# Patient Record
Sex: MALE | Race: WHITE | Employment: OTHER | URBAN - METROPOLITAN AREA
[De-identification: names, ages, dates, MRNs, and addresses within clinical notes are randomized per-mention and may not be internally consistent; named-entity substitution may affect disease eponyms.]

---

## 2018-05-20 ENCOUNTER — APPOINTMENT (OUTPATIENT)
Dept: CT IMAGING | Age: 63
End: 2018-05-20
Payer: COMMERCIAL

## 2018-05-20 ENCOUNTER — HOSPITAL ENCOUNTER (EMERGENCY)
Age: 63
Discharge: HOME | End: 2018-05-20
Attending: EMERGENCY MEDICINE
Payer: COMMERCIAL

## 2018-05-20 VITALS
TEMPERATURE: 98.1 F | OXYGEN SATURATION: 98 % | WEIGHT: 225 LBS | SYSTOLIC BLOOD PRESSURE: 126 MMHG | RESPIRATION RATE: 16 BRPM | HEART RATE: 70 BPM | HEIGHT: 72 IN | DIASTOLIC BLOOD PRESSURE: 72 MMHG | BODY MASS INDEX: 30.48 KG/M2

## 2018-05-20 DIAGNOSIS — R20.0 NUMBNESS: Primary | ICD-10-CM

## 2018-05-20 DIAGNOSIS — R51.9 NONINTRACTABLE HEADACHE, UNSPECIFIED CHRONICITY PATTERN, UNSPECIFIED HEADACHE TYPE: ICD-10-CM

## 2018-05-20 LAB
ALBUMIN SERPL-MCNC: 4.4 G/DL (ref 3.5–5.2)
ALP BLD-CCNC: 90 U/L (ref 40–129)
ALT SERPL-CCNC: 22 U/L (ref 0–40)
ANION GAP SERPL CALCULATED.3IONS-SCNC: 13 MMOL/L (ref 7–16)
AST SERPL-CCNC: 20 U/L (ref 0–39)
BASOPHILS ABSOLUTE: 0.07 E9/L (ref 0–0.2)
BASOPHILS RELATIVE PERCENT: 0.8 % (ref 0–2)
BILIRUB SERPL-MCNC: 0.8 MG/DL (ref 0–1.2)
BUN BLDV-MCNC: 15 MG/DL (ref 8–23)
CALCIUM SERPL-MCNC: 9.3 MG/DL (ref 8.6–10.2)
CHLORIDE BLD-SCNC: 100 MMOL/L (ref 98–107)
CO2: 26 MMOL/L (ref 22–29)
CREAT SERPL-MCNC: 0.9 MG/DL (ref 0.7–1.2)
EKG ATRIAL RATE: 63 BPM
EKG P AXIS: 51 DEGREES
EKG P-R INTERVAL: 172 MS
EKG Q-T INTERVAL: 420 MS
EKG QRS DURATION: 82 MS
EKG QTC CALCULATION (BAZETT): 429 MS
EKG R AXIS: 30 DEGREES
EKG T AXIS: 44 DEGREES
EKG VENTRICULAR RATE: 63 BPM
EOSINOPHILS ABSOLUTE: 0.11 E9/L (ref 0.05–0.5)
EOSINOPHILS RELATIVE PERCENT: 1.3 % (ref 0–6)
GFR AFRICAN AMERICAN: >60
GFR NON-AFRICAN AMERICAN: >60 ML/MIN/1.73
GLUCOSE BLD-MCNC: 65 MG/DL (ref 74–109)
HCT VFR BLD CALC: 47.4 % (ref 37–54)
HEMOGLOBIN: 16.7 G/DL (ref 12.5–16.5)
IMMATURE GRANULOCYTES #: 0.03 E9/L
IMMATURE GRANULOCYTES %: 0.4 % (ref 0–5)
LYMPHOCYTES ABSOLUTE: 2.62 E9/L (ref 1.5–4)
LYMPHOCYTES RELATIVE PERCENT: 31.7 % (ref 20–42)
MCH RBC QN AUTO: 29.1 PG (ref 26–35)
MCHC RBC AUTO-ENTMCNC: 35.2 % (ref 32–34.5)
MCV RBC AUTO: 82.6 FL (ref 80–99.9)
MONOCYTES ABSOLUTE: 0.61 E9/L (ref 0.1–0.95)
MONOCYTES RELATIVE PERCENT: 7.4 % (ref 2–12)
NEUTROPHILS ABSOLUTE: 4.83 E9/L (ref 1.8–7.3)
NEUTROPHILS RELATIVE PERCENT: 58.4 % (ref 43–80)
PDW BLD-RTO: 11.8 FL (ref 11.5–15)
PLATELET # BLD: 309 E9/L (ref 130–450)
PMV BLD AUTO: 10.6 FL (ref 7–12)
POTASSIUM SERPL-SCNC: 4.1 MMOL/L (ref 3.5–5)
RBC # BLD: 5.74 E12/L (ref 3.8–5.8)
SODIUM BLD-SCNC: 139 MMOL/L (ref 132–146)
TOTAL PROTEIN: 7.7 G/DL (ref 6.4–8.3)
TROPONIN: <0.01 NG/ML (ref 0–0.03)
WBC # BLD: 8.3 E9/L (ref 4.5–11.5)

## 2018-05-20 PROCEDURE — 80053 COMPREHEN METABOLIC PANEL: CPT

## 2018-05-20 PROCEDURE — 99284 EMERGENCY DEPT VISIT MOD MDM: CPT

## 2018-05-20 PROCEDURE — 84484 ASSAY OF TROPONIN QUANT: CPT

## 2018-05-20 PROCEDURE — 6360000002 HC RX W HCPCS: Performed by: STUDENT IN AN ORGANIZED HEALTH CARE EDUCATION/TRAINING PROGRAM

## 2018-05-20 PROCEDURE — 96374 THER/PROPH/DIAG INJ IV PUSH: CPT

## 2018-05-20 PROCEDURE — 36415 COLL VENOUS BLD VENIPUNCTURE: CPT

## 2018-05-20 PROCEDURE — 85025 COMPLETE CBC W/AUTO DIFF WBC: CPT

## 2018-05-20 PROCEDURE — 70450 CT HEAD/BRAIN W/O DYE: CPT

## 2018-05-20 RX ORDER — KETOROLAC TROMETHAMINE 15 MG/ML
15 INJECTION, SOLUTION INTRAMUSCULAR; INTRAVENOUS ONCE
Status: COMPLETED | OUTPATIENT
Start: 2018-05-20 | End: 2018-05-20

## 2018-05-20 RX ADMIN — KETOROLAC TROMETHAMINE 15 MG: 15 INJECTION, SOLUTION INTRAMUSCULAR; INTRAVENOUS at 19:14

## 2018-05-20 ASSESSMENT — ENCOUNTER SYMPTOMS
SORE THROAT: 0
NAUSEA: 0
VOMITING: 0
SHORTNESS OF BREATH: 0
COLOR CHANGE: 0
COUGH: 0
BACK PAIN: 0
DIARRHEA: 0
ABDOMINAL PAIN: 0

## 2018-05-20 ASSESSMENT — PAIN SCALES - GENERAL: PAINLEVEL_OUTOF10: 6

## 2018-06-18 ENCOUNTER — HOSPITAL ENCOUNTER (OUTPATIENT)
Age: 63
Discharge: HOME | End: 2018-06-20
Payer: COMMERCIAL

## 2018-06-18 LAB
ALBUMIN SERPL-MCNC: 4.6 G/DL (ref 3.5–5.2)
ALP BLD-CCNC: 79 U/L (ref 40–129)
ALT SERPL-CCNC: 21 U/L (ref 0–40)
ANION GAP SERPL CALCULATED.3IONS-SCNC: 12 MMOL/L (ref 7–16)
AST SERPL-CCNC: 27 U/L (ref 0–39)
BILIRUB SERPL-MCNC: 0.4 MG/DL (ref 0–1.2)
BUN BLDV-MCNC: 19 MG/DL (ref 8–23)
CALCIUM SERPL-MCNC: 9.7 MG/DL (ref 8.6–10.2)
CHLORIDE BLD-SCNC: 101 MMOL/L (ref 98–107)
CO2: 24 MMOL/L (ref 22–29)
CREAT SERPL-MCNC: 1 MG/DL (ref 0.7–1.2)
GFR AFRICAN AMERICAN: >60
GFR NON-AFRICAN AMERICAN: >60 ML/MIN/1.73
GLUCOSE BLD-MCNC: 179 MG/DL (ref 74–109)
HBA1C MFR BLD: 9.6 % (ref 4.8–5.9)
POTASSIUM SERPL-SCNC: 4.7 MMOL/L (ref 3.5–5)
SODIUM BLD-SCNC: 137 MMOL/L (ref 132–146)
TOTAL PROTEIN: 7.4 G/DL (ref 6.4–8.3)

## 2018-06-18 PROCEDURE — 80053 COMPREHEN METABOLIC PANEL: CPT

## 2018-06-18 PROCEDURE — 83036 HEMOGLOBIN GLYCOSYLATED A1C: CPT

## 2018-07-17 ENCOUNTER — HOSPITAL ENCOUNTER (EMERGENCY)
Age: 63
Discharge: HOME | End: 2018-07-17
Payer: COMMERCIAL

## 2018-07-17 ENCOUNTER — APPOINTMENT (OUTPATIENT)
Dept: GENERAL RADIOLOGY | Age: 63
End: 2018-07-17
Payer: COMMERCIAL

## 2018-07-17 VITALS
HEART RATE: 64 BPM | TEMPERATURE: 97.4 F | DIASTOLIC BLOOD PRESSURE: 74 MMHG | HEIGHT: 72 IN | RESPIRATION RATE: 16 BRPM | BODY MASS INDEX: 29.8 KG/M2 | OXYGEN SATURATION: 98 % | SYSTOLIC BLOOD PRESSURE: 137 MMHG | WEIGHT: 220 LBS

## 2018-07-17 DIAGNOSIS — M54.50 ACUTE EXACERBATION OF CHRONIC LOW BACK PAIN: Primary | ICD-10-CM

## 2018-07-17 DIAGNOSIS — Z87.81 HISTORY OF COMPRESSION FRACTURE OF SPINE: ICD-10-CM

## 2018-07-17 DIAGNOSIS — G89.29 ACUTE EXACERBATION OF CHRONIC LOW BACK PAIN: Primary | ICD-10-CM

## 2018-07-17 PROCEDURE — 96372 THER/PROPH/DIAG INJ SC/IM: CPT

## 2018-07-17 PROCEDURE — 99283 EMERGENCY DEPT VISIT LOW MDM: CPT

## 2018-07-17 PROCEDURE — 72100 X-RAY EXAM L-S SPINE 2/3 VWS: CPT

## 2018-07-17 PROCEDURE — 6360000002 HC RX W HCPCS: Performed by: PHYSICIAN ASSISTANT

## 2018-07-17 RX ORDER — PREDNISONE 10 MG/1
20 TABLET ORAL DAILY
Qty: 10 TABLET | Refills: 0 | Status: SHIPPED | OUTPATIENT
Start: 2018-07-17 | End: 2018-07-22

## 2018-07-17 RX ORDER — NAPROXEN 500 MG/1
500 TABLET ORAL 2 TIMES DAILY
Qty: 14 TABLET | Refills: 0 | Status: SHIPPED | OUTPATIENT
Start: 2018-07-17 | End: 2018-07-24

## 2018-07-17 RX ORDER — CYCLOBENZAPRINE HCL 10 MG
10 TABLET ORAL 3 TIMES DAILY PRN
Qty: 21 TABLET | Refills: 0 | Status: SHIPPED | OUTPATIENT
Start: 2018-07-17 | End: 2018-07-24

## 2018-07-17 RX ORDER — DEXAMETHASONE SODIUM PHOSPHATE 10 MG/ML
10 INJECTION, SOLUTION INTRAMUSCULAR; INTRAVENOUS ONCE
Status: COMPLETED | OUTPATIENT
Start: 2018-07-17 | End: 2018-07-17

## 2018-07-17 RX ORDER — ORPHENADRINE CITRATE 30 MG/ML
60 INJECTION INTRAMUSCULAR; INTRAVENOUS ONCE
Status: COMPLETED | OUTPATIENT
Start: 2018-07-17 | End: 2018-07-17

## 2018-07-17 RX ORDER — KETOROLAC TROMETHAMINE 30 MG/ML
30 INJECTION, SOLUTION INTRAMUSCULAR; INTRAVENOUS ONCE
Status: COMPLETED | OUTPATIENT
Start: 2018-07-17 | End: 2018-07-17

## 2018-07-17 RX ADMIN — DEXAMETHASONE SODIUM PHOSPHATE 10 MG: 10 INJECTION, SOLUTION INTRAMUSCULAR; INTRAVENOUS at 10:26

## 2018-07-17 RX ADMIN — KETOROLAC TROMETHAMINE 30 MG: 30 INJECTION, SOLUTION INTRAMUSCULAR; INTRAVENOUS at 10:26

## 2018-07-17 RX ADMIN — ORPHENADRINE CITRATE 60 MG: 30 INJECTION INTRAMUSCULAR; INTRAVENOUS at 10:27

## 2018-07-17 ASSESSMENT — PAIN SCALES - GENERAL
PAINLEVEL_OUTOF10: 7
PAINLEVEL_OUTOF10: 9
PAINLEVEL_OUTOF10: 9

## 2018-07-17 ASSESSMENT — PAIN DESCRIPTION - ORIENTATION: ORIENTATION: LOWER

## 2018-07-17 ASSESSMENT — PAIN DESCRIPTION - LOCATION: LOCATION: BACK

## 2018-07-17 ASSESSMENT — PAIN DESCRIPTION - PAIN TYPE: TYPE: ACUTE PAIN

## 2018-07-17 ASSESSMENT — PAIN DESCRIPTION - DESCRIPTORS: DESCRIPTORS: ACHING

## 2018-07-17 NOTE — ED PROVIDER NOTES
orphenadrine (NORFLEX) injection 60 mg (60 mg Intramuscular Given 7/17/18 1027)   dexamethasone (PF) (DECADRON) injection 10 mg (10 mg Intramuscular Given 7/17/18 1026)        Re-examination:  7/17/18       Time: 1115    Patient's symptoms are improving. Consult(s):   None    Procedure(s):   none    Medical Decision Making/Counseling:    Patient presents to the emergency department for low back pain. Radiographs were obtained and confirmed the above findings. Patient received the medications listed above and he experienced moderate relief of his symptoms. Patient was able to walk prior to discharge. He has been given the contact information for Dr. Jonathan Chandra, neurosurgery, who he should follow up with regarding his compression fractures. Specific conditions for emergent return have been discussed and the patient verbalized understanding to return immediately for new or worsening symptoms. Assessment      1. Acute exacerbation of chronic low back pain    2. History of compression fracture of spine       Plan   Discharge to home  Patient condition is good    New Medications     New Prescriptions    CYCLOBENZAPRINE (FLEXERIL) 10 MG TABLET    Take 1 tablet by mouth 3 times daily as needed for Muscle spasms    NAPROXEN (NAPROSYN) 500 MG TABLET    Take 1 tablet by mouth 2 times daily for 7 days    PREDNISONE (DELTASONE) 10 MG TABLET    Take 2 tablets by mouth daily for 5 days     Electronically signed by Ewa Dubon PA-C   DD: 7/17/18  **This report was transcribed using voice recognition software. Every effort was made to ensure accuracy; however, inadvertent computerized transcription errors may be present.   END OF ED PROVIDER NOTE     Ewa Dubon PA-C  07/17/18 [id number]

## 2018-09-18 ENCOUNTER — HOSPITAL ENCOUNTER (OUTPATIENT)
Age: 63
Discharge: HOME | End: 2018-09-20
Payer: COMMERCIAL

## 2018-09-18 PROCEDURE — 83036 HEMOGLOBIN GLYCOSYLATED A1C: CPT

## 2018-09-19 LAB — HBA1C MFR BLD: 8.5 % (ref 4–5.6)

## 2019-01-02 ENCOUNTER — HOSPITAL ENCOUNTER (OUTPATIENT)
Age: 64
Discharge: HOME | End: 2019-01-04
Payer: COMMERCIAL

## 2019-01-02 LAB
ALBUMIN SERPL-MCNC: 4.5 G/DL (ref 3.5–5.2)
ALP BLD-CCNC: 91 U/L (ref 40–129)
ALT SERPL-CCNC: 25 U/L (ref 0–40)
ANION GAP SERPL CALCULATED.3IONS-SCNC: 16 MMOL/L (ref 7–16)
AST SERPL-CCNC: 33 U/L (ref 0–39)
BASOPHILS ABSOLUTE: 0.07 E9/L (ref 0–0.2)
BASOPHILS RELATIVE PERCENT: 1.1 % (ref 0–2)
BILIRUB SERPL-MCNC: 0.6 MG/DL (ref 0–1.2)
BUN BLDV-MCNC: 16 MG/DL (ref 8–23)
CALCIUM SERPL-MCNC: 9.5 MG/DL (ref 8.6–10.2)
CHLORIDE BLD-SCNC: 98 MMOL/L (ref 98–107)
CHOLESTEROL, TOTAL: 207 MG/DL (ref 0–199)
CO2: 24 MMOL/L (ref 22–29)
CREAT SERPL-MCNC: 0.9 MG/DL (ref 0.7–1.2)
EOSINOPHILS ABSOLUTE: 0.13 E9/L (ref 0.05–0.5)
EOSINOPHILS RELATIVE PERCENT: 2 % (ref 0–6)
GFR AFRICAN AMERICAN: >60
GFR NON-AFRICAN AMERICAN: >60 ML/MIN/1.73
GLUCOSE BLD-MCNC: 241 MG/DL (ref 74–99)
HCT VFR BLD CALC: 45.7 % (ref 37–54)
HDLC SERPL-MCNC: 87 MG/DL
HEMOGLOBIN: 15.7 G/DL (ref 12.5–16.5)
IMMATURE GRANULOCYTES #: 0.02 E9/L
IMMATURE GRANULOCYTES %: 0.3 % (ref 0–5)
LDL CHOLESTEROL CALCULATED: 105 MG/DL (ref 0–99)
LYMPHOCYTES ABSOLUTE: 1.43 E9/L (ref 1.5–4)
LYMPHOCYTES RELATIVE PERCENT: 22.1 % (ref 20–42)
MCH RBC QN AUTO: 29.8 PG (ref 26–35)
MCHC RBC AUTO-ENTMCNC: 34.4 % (ref 32–34.5)
MCV RBC AUTO: 86.7 FL (ref 80–99.9)
MONOCYTES ABSOLUTE: 0.43 E9/L (ref 0.1–0.95)
MONOCYTES RELATIVE PERCENT: 6.6 % (ref 2–12)
NEUTROPHILS ABSOLUTE: 4.4 E9/L (ref 1.8–7.3)
NEUTROPHILS RELATIVE PERCENT: 67.9 % (ref 43–80)
PDW BLD-RTO: 11.8 FL (ref 11.5–15)
PLATELET # BLD: 286 E9/L (ref 130–450)
PMV BLD AUTO: 12.3 FL (ref 7–12)
POTASSIUM SERPL-SCNC: 5.4 MMOL/L (ref 3.5–5)
RBC # BLD: 5.27 E12/L (ref 3.8–5.8)
SEDIMENTATION RATE, ERYTHROCYTE: 2 MM/HR (ref 0–15)
SODIUM BLD-SCNC: 138 MMOL/L (ref 132–146)
TOTAL PROTEIN: 7.5 G/DL (ref 6.4–8.3)
TRIGL SERPL-MCNC: 75 MG/DL (ref 0–149)
VLDLC SERPL CALC-MCNC: 15 MG/DL
WBC # BLD: 6.5 E9/L (ref 4.5–11.5)

## 2019-01-02 PROCEDURE — 85025 COMPLETE CBC W/AUTO DIFF WBC: CPT

## 2019-01-02 PROCEDURE — 80053 COMPREHEN METABOLIC PANEL: CPT

## 2019-01-02 PROCEDURE — 85651 RBC SED RATE NONAUTOMATED: CPT

## 2019-01-02 PROCEDURE — 80061 LIPID PANEL: CPT

## 2019-02-07 ENCOUNTER — HOSPITAL ENCOUNTER (OUTPATIENT)
Age: 64
Discharge: HOME | End: 2019-02-09
Payer: COMMERCIAL

## 2019-02-07 LAB — HBA1C MFR BLD: 9.6 % (ref 4–5.6)

## 2019-02-07 PROCEDURE — 83036 HEMOGLOBIN GLYCOSYLATED A1C: CPT

## 2019-05-13 ENCOUNTER — HOSPITAL ENCOUNTER (OUTPATIENT)
Age: 64
Discharge: HOME | End: 2019-05-15
Payer: COMMERCIAL

## 2019-05-13 PROCEDURE — 83036 HEMOGLOBIN GLYCOSYLATED A1C: CPT

## 2019-05-14 LAB — HBA1C MFR BLD: 9.7 % (ref 4–5.6)

## 2019-08-13 ENCOUNTER — HOSPITAL ENCOUNTER (OUTPATIENT)
Age: 64
Discharge: HOME | End: 2019-08-15
Payer: COMMERCIAL

## 2019-08-13 LAB
CREATININE URINE: 116 MG/DL (ref 40–278)
HBA1C MFR BLD: 9.2 % (ref 4–5.6)
MICROALBUMIN UR-MCNC: <12 MG/L
MICROALBUMIN/CREAT UR-RTO: ABNORMAL (ref 0–30)

## 2019-08-13 PROCEDURE — 82570 ASSAY OF URINE CREATININE: CPT

## 2019-08-13 PROCEDURE — 83036 HEMOGLOBIN GLYCOSYLATED A1C: CPT

## 2019-08-13 PROCEDURE — 82044 UR ALBUMIN SEMIQUANTITATIVE: CPT

## 2019-08-19 ENCOUNTER — HOSPITAL ENCOUNTER (EMERGENCY)
Age: 64
Discharge: HOME | End: 2019-08-19
Payer: COMMERCIAL

## 2019-08-19 VITALS
DIASTOLIC BLOOD PRESSURE: 77 MMHG | OXYGEN SATURATION: 100 % | HEIGHT: 72 IN | TEMPERATURE: 97.7 F | SYSTOLIC BLOOD PRESSURE: 134 MMHG | BODY MASS INDEX: 28.85 KG/M2 | WEIGHT: 213 LBS | RESPIRATION RATE: 18 BRPM | HEART RATE: 56 BPM

## 2019-08-19 DIAGNOSIS — R10.84 GENERALIZED ABDOMINAL PAIN: ICD-10-CM

## 2019-08-19 DIAGNOSIS — R51.9 ACUTE NONINTRACTABLE HEADACHE, UNSPECIFIED HEADACHE TYPE: Primary | ICD-10-CM

## 2019-08-19 LAB
ALBUMIN SERPL-MCNC: 4.5 G/DL (ref 3.5–5.2)
ALP BLD-CCNC: 81 U/L (ref 40–129)
ALT SERPL-CCNC: 23 U/L (ref 0–40)
ANION GAP SERPL CALCULATED.3IONS-SCNC: 10 MMOL/L (ref 7–16)
AST SERPL-CCNC: 28 U/L (ref 0–39)
BASOPHILS ABSOLUTE: 0.08 E9/L (ref 0–0.2)
BASOPHILS RELATIVE PERCENT: 1.2 % (ref 0–2)
BILIRUB SERPL-MCNC: 0.5 MG/DL (ref 0–1.2)
BILIRUBIN URINE: NEGATIVE
BLOOD, URINE: NEGATIVE
BUN BLDV-MCNC: 20 MG/DL (ref 8–23)
CALCIUM SERPL-MCNC: 9.5 MG/DL (ref 8.6–10.2)
CHLORIDE BLD-SCNC: 106 MMOL/L (ref 98–107)
CLARITY: CLEAR
CO2: 23 MMOL/L (ref 22–29)
COLOR: YELLOW
CREAT SERPL-MCNC: 1 MG/DL (ref 0.7–1.2)
EOSINOPHILS ABSOLUTE: 0.1 E9/L (ref 0.05–0.5)
EOSINOPHILS RELATIVE PERCENT: 1.5 % (ref 0–6)
GFR AFRICAN AMERICAN: >60
GFR NON-AFRICAN AMERICAN: >60 ML/MIN/1.73
GLUCOSE BLD-MCNC: 99 MG/DL (ref 74–99)
GLUCOSE URINE: NEGATIVE MG/DL
HCT VFR BLD CALC: 44.4 % (ref 37–54)
HEMOGLOBIN: 15.5 G/DL (ref 12.5–16.5)
IMMATURE GRANULOCYTES #: 0.01 E9/L
IMMATURE GRANULOCYTES %: 0.2 % (ref 0–5)
KETONES, URINE: NEGATIVE MG/DL
LEUKOCYTE ESTERASE, URINE: NEGATIVE
LYMPHOCYTES ABSOLUTE: 2.7 E9/L (ref 1.5–4)
LYMPHOCYTES RELATIVE PERCENT: 40.8 % (ref 20–42)
MCH RBC QN AUTO: 29.2 PG (ref 26–35)
MCHC RBC AUTO-ENTMCNC: 34.9 % (ref 32–34.5)
MCV RBC AUTO: 83.8 FL (ref 80–99.9)
MONOCYTES ABSOLUTE: 0.58 E9/L (ref 0.1–0.95)
MONOCYTES RELATIVE PERCENT: 8.8 % (ref 2–12)
NEUTROPHILS ABSOLUTE: 3.14 E9/L (ref 1.8–7.3)
NEUTROPHILS RELATIVE PERCENT: 47.5 % (ref 43–80)
NITRITE, URINE: NEGATIVE
PDW BLD-RTO: 11.9 FL (ref 11.5–15)
PH UA: 6 (ref 5–9)
PLATELET # BLD: 306 E9/L (ref 130–450)
PMV BLD AUTO: 10.4 FL (ref 7–12)
POTASSIUM SERPL-SCNC: 5.6 MMOL/L (ref 3.5–5)
PROTEIN UA: NEGATIVE MG/DL
RBC # BLD: 5.3 E12/L (ref 3.8–5.8)
SODIUM BLD-SCNC: 139 MMOL/L (ref 132–146)
SPECIFIC GRAVITY UA: 1.02 (ref 1–1.03)
TOTAL PROTEIN: 7.7 G/DL (ref 6.4–8.3)
UROBILINOGEN, URINE: 1 E.U./DL
WBC # BLD: 6.6 E9/L (ref 4.5–11.5)

## 2019-08-19 PROCEDURE — 99284 EMERGENCY DEPT VISIT MOD MDM: CPT

## 2019-08-19 PROCEDURE — 2580000003 HC RX 258: Performed by: NURSE PRACTITIONER

## 2019-08-19 PROCEDURE — 80053 COMPREHEN METABOLIC PANEL: CPT

## 2019-08-19 PROCEDURE — 85025 COMPLETE CBC W/AUTO DIFF WBC: CPT

## 2019-08-19 PROCEDURE — 81003 URINALYSIS AUTO W/O SCOPE: CPT

## 2019-08-19 PROCEDURE — 96375 TX/PRO/DX INJ NEW DRUG ADDON: CPT

## 2019-08-19 PROCEDURE — 36415 COLL VENOUS BLD VENIPUNCTURE: CPT

## 2019-08-19 PROCEDURE — 6360000002 HC RX W HCPCS: Performed by: NURSE PRACTITIONER

## 2019-08-19 PROCEDURE — 96374 THER/PROPH/DIAG INJ IV PUSH: CPT

## 2019-08-19 RX ORDER — 0.9 % SODIUM CHLORIDE 0.9 %
1000 INTRAVENOUS SOLUTION INTRAVENOUS ONCE
Status: COMPLETED | OUTPATIENT
Start: 2019-08-19 | End: 2019-08-19

## 2019-08-19 RX ORDER — KETOROLAC TROMETHAMINE 15 MG/ML
15 INJECTION, SOLUTION INTRAMUSCULAR; INTRAVENOUS ONCE
Status: COMPLETED | OUTPATIENT
Start: 2019-08-19 | End: 2019-08-19

## 2019-08-19 RX ORDER — DIPHENHYDRAMINE HYDROCHLORIDE 50 MG/ML
12.5 INJECTION INTRAMUSCULAR; INTRAVENOUS ONCE
Status: COMPLETED | OUTPATIENT
Start: 2019-08-19 | End: 2019-08-19

## 2019-08-19 RX ORDER — PROCHLORPERAZINE EDISYLATE 5 MG/ML
10 INJECTION INTRAMUSCULAR; INTRAVENOUS ONCE
Status: COMPLETED | OUTPATIENT
Start: 2019-08-19 | End: 2019-08-19

## 2019-08-19 RX ADMIN — PROCHLORPERAZINE EDISYLATE 10 MG: 5 INJECTION INTRAMUSCULAR; INTRAVENOUS at 20:29

## 2019-08-19 RX ADMIN — KETOROLAC TROMETHAMINE 15 MG: 15 INJECTION, SOLUTION INTRAMUSCULAR; INTRAVENOUS at 20:29

## 2019-08-19 RX ADMIN — SODIUM CHLORIDE 1000 ML: 9 INJECTION, SOLUTION INTRAVENOUS at 20:29

## 2019-08-19 RX ADMIN — DIPHENHYDRAMINE HYDROCHLORIDE 12.5 MG: 50 INJECTION INTRAMUSCULAR; INTRAVENOUS at 20:29

## 2019-08-19 ASSESSMENT — PAIN SCALES - GENERAL
PAINLEVEL_OUTOF10: 5
PAINLEVEL_OUTOF10: 8
PAINLEVEL_OUTOF10: 8

## 2019-08-19 ASSESSMENT — PAIN DESCRIPTION - LOCATION: LOCATION: ABDOMEN;HEAD

## 2019-08-19 ASSESSMENT — PAIN DESCRIPTION - PAIN TYPE: TYPE: ACUTE PAIN

## 2019-08-19 NOTE — ED PROVIDER NOTES
[facility]     Department of Emergency Medicine   ED  Provider Note  Admit Date/RoomTime: 8/19/2019  7:31 PM  ED Room: 17/17   Chief Complaint:   Headache (symptoms starting today); Abdominal Pain; and Numbness (face and hands)    History of Present Illness   Source of history provided by:  patient. History/Exam Limitations: none. Barry Parks is a 59 y.o. old male who has a past medical hx of:   Past Medical History:   Diagnosis Date    Chest pain 10-    exercise nuclear stress test    Depression     Diabetes mellitus ([facility] [address]     presents to the emergency department by private vehicle, for complaint of gradual onset frontal sharp pain which began 12 hour(s) prior to arrival.  Since onset the symptoms have been no change and moderate in severity. The patient has history of headaches of unknown type diagnosed in the past.   Today's episode is typical for him. He has had CT, MRI and PCP evaluation in the past.  MRI of the head from 1/10/2019 was reviewed and discussed with him. The pain is associated with generalized bilateral paresthesias and sharp generalized abdominal pains and negative for syncope, seizures, amaurosis, diplopia, other visual changes, involuntary movements, tremor, speech impairment, nausea, vomiting, diarrhea, dysuria, or weakness. The symptoms are aggravated by stated increased stress and working overtime last week and relieved by nothing. There has been no history of recent trauma. Treatment prior to arrival consisted of: unable to obtain relief with OTC meds with no relief of symptoms. He has a history of no anticoagulation use. ROS    Pertinent positives and negatives are stated within HPI, all other systems reviewed and are negative.     Past Surgical History:   Procedure Laterality Date    COLONOSCOPY      ENDOSCOPY, COLON, DIAGNOSTIC      FRACTURE SURGERY Right 2/2015    spiral fx    TONSILLECTOMY      TYMPANOPLASTY      bilateral   Social History:

## 2020-05-12 ENCOUNTER — HOSPITAL ENCOUNTER (OUTPATIENT)
Age: 65
Discharge: HOME | End: 2020-05-14
Payer: COMMERCIAL

## 2020-05-12 LAB
BASOPHILS ABSOLUTE: 0.07 E9/L (ref 0–0.2)
BASOPHILS RELATIVE PERCENT: 0.8 % (ref 0–2)
EOSINOPHILS ABSOLUTE: 0.03 E9/L (ref 0.05–0.5)
EOSINOPHILS RELATIVE PERCENT: 0.4 % (ref 0–6)
HCT VFR BLD CALC: 43.8 % (ref 37–54)
HEMOGLOBIN: 15 G/DL (ref 12.5–16.5)
IMMATURE GRANULOCYTES #: 0.03 E9/L
IMMATURE GRANULOCYTES %: 0.4 % (ref 0–5)
LYMPHOCYTES ABSOLUTE: 1.47 E9/L (ref 1.5–4)
LYMPHOCYTES RELATIVE PERCENT: 17.2 % (ref 20–42)
MCH RBC QN AUTO: 29.2 PG (ref 26–35)
MCHC RBC AUTO-ENTMCNC: 34.2 % (ref 32–34.5)
MCV RBC AUTO: 85.2 FL (ref 80–99.9)
MONOCYTES ABSOLUTE: 0.6 E9/L (ref 0.1–0.95)
MONOCYTES RELATIVE PERCENT: 7 % (ref 2–12)
NEUTROPHILS ABSOLUTE: 6.37 E9/L (ref 1.8–7.3)
NEUTROPHILS RELATIVE PERCENT: 74.2 % (ref 43–80)
PDW BLD-RTO: 11.8 FL (ref 11.5–15)
PLATELET # BLD: 361 E9/L (ref 130–450)
PMV BLD AUTO: 11.5 FL (ref 7–12)
RBC # BLD: 5.14 E12/L (ref 3.8–5.8)
WBC # BLD: 8.6 E9/L (ref 4.5–11.5)

## 2020-05-12 PROCEDURE — 85025 COMPLETE CBC W/AUTO DIFF WBC: CPT

## 2020-05-12 PROCEDURE — 80061 LIPID PANEL: CPT

## 2020-05-12 PROCEDURE — 84443 ASSAY THYROID STIM HORMONE: CPT

## 2020-05-12 PROCEDURE — 80053 COMPREHEN METABOLIC PANEL: CPT

## 2020-05-12 PROCEDURE — 83036 HEMOGLOBIN GLYCOSYLATED A1C: CPT

## 2020-05-13 LAB
ALBUMIN SERPL-MCNC: 4.5 G/DL (ref 3.5–5.2)
ALP BLD-CCNC: 93 U/L (ref 40–129)
ALT SERPL-CCNC: 24 U/L (ref 0–40)
ANION GAP SERPL CALCULATED.3IONS-SCNC: 14 MMOL/L (ref 7–16)
AST SERPL-CCNC: 15 U/L (ref 0–39)
BILIRUB SERPL-MCNC: 0.7 MG/DL (ref 0–1.2)
BUN BLDV-MCNC: 20 MG/DL (ref 8–23)
CALCIUM SERPL-MCNC: 9.8 MG/DL (ref 8.6–10.2)
CHLORIDE BLD-SCNC: 93 MMOL/L (ref 98–107)
CHOLESTEROL, TOTAL: 224 MG/DL (ref 0–199)
CO2: 22 MMOL/L (ref 22–29)
CREAT SERPL-MCNC: 1 MG/DL (ref 0.7–1.2)
GFR AFRICAN AMERICAN: >60
GFR NON-AFRICAN AMERICAN: >60 ML/MIN/1.73
GLUCOSE BLD-MCNC: 671 MG/DL (ref 74–99)
HBA1C MFR BLD: 10 % (ref 4–5.6)
HDLC SERPL-MCNC: 75 MG/DL
LDL CHOLESTEROL CALCULATED: 136 MG/DL (ref 0–99)
POTASSIUM SERPL-SCNC: 5.1 MMOL/L (ref 3.5–5)
SODIUM BLD-SCNC: 129 MMOL/L (ref 132–146)
TOTAL PROTEIN: 7.5 G/DL (ref 6.4–8.3)
TRIGL SERPL-MCNC: 67 MG/DL (ref 0–149)
TSH SERPL DL<=0.05 MIU/L-ACNC: 1.59 UIU/ML (ref 0.27–4.2)
VLDLC SERPL CALC-MCNC: 13 MG/DL

## 2020-05-18 ENCOUNTER — HOSPITAL ENCOUNTER (OUTPATIENT)
Dept: OCCUPATIONAL THERAPY | Age: 65
Setting detail: THERAPIES SERIES
Discharge: HOME | End: 2020-05-18
Payer: MEDICARE

## 2020-05-18 PROCEDURE — 97140 MANUAL THERAPY 1/> REGIONS: CPT | Performed by: OCCUPATIONAL THERAPIST

## 2020-05-18 PROCEDURE — 97165 OT EVAL LOW COMPLEX 30 MIN: CPT | Performed by: OCCUPATIONAL THERAPIST

## 2020-05-18 PROCEDURE — 97110 THERAPEUTIC EXERCISES: CPT | Performed by: OCCUPATIONAL THERAPIST

## 2020-05-18 NOTE — PROGRESS NOTES
No  [] Yes:  Barriers to Goal Achievement[de-identified]          [x] No  [] Yes:  Domestic Concerns:                           [x] No  [] Yes:     Goal Formulation: Patient  Time In: 1           Time Out:2                      Timed Code Treatment Minutes: 60 minutes        PLAN      Treatment to include:   [x] Instruction in HEP                   Modalities:  [x] Therapeutic Exercise        [x] Ultrasound               [] Electrical Stimulation/Attended  [x] PROM/Stretching                    [x] Fluidotherapy          [x]  Paraffin                   [x] AAROM  [x] AROM                 [] Iontophoresis: 4 mg/mL; Dexamethasone Sodium                                        [] Neuromuscular Re-education [] Splinting         [] Desensitization          [x] Therapeutic Activity       [] Pain Management with/without modalities PRN                 [x] Manual Therapy/Fascial release                            [x] Tendon Glides        [x]Joint Protection/Training  []Ergonomics                             [x] Joint Mobilization        [] Adaptive Equipment Assessment/Training                             [x] Manual Edema Mobilization    [] Energy Conservation/Work Simplification  [x] GM/FM Coordination       [] Safety retraining/education per  individual diagnosis/goals  [x] Scar Management        [x] ADL/IADL re-training       Patient Specific Goal: Normal use of my right hand                             GOALS (Long term same as Short term):  1) Patient will demonstrate good understanding of home program (exercises/activities/diagnosis/prognosis/goals) with good accuracy. 2) Patient will demonstrate increased active/passive range of motion of their affected extremity/hand to [facility] for ADL/IADL completion. 3) Patient will demonstrate increased /pinch strength of at least 10 / 5 pinch pounds of their affected hand.    4) Patient to report decreased pain in their affected hand/upper extremity from 2-5/10 to 0-2/10 or less with

## 2020-05-21 ENCOUNTER — HOSPITAL ENCOUNTER (OUTPATIENT)
Dept: OCCUPATIONAL THERAPY | Age: 65
Setting detail: THERAPIES SERIES
Discharge: HOME | End: 2020-05-21
Payer: MEDICARE

## 2020-05-21 PROCEDURE — 97110 THERAPEUTIC EXERCISES: CPT | Performed by: OCCUPATIONAL THERAPIST

## 2020-05-21 PROCEDURE — 97022 WHIRLPOOL THERAPY: CPT | Performed by: OCCUPATIONAL THERAPIST

## 2020-05-21 PROCEDURE — 97140 MANUAL THERAPY 1/> REGIONS: CPT | Performed by: OCCUPATIONAL THERAPIST

## 2020-05-21 NOTE — PROGRESS NOTES
OCCUPATIONAL THERAPY PROGRESS NOTE    Date:  2020     Initial Evaluation Date: 20                                Evaluating Therapist: Eliza Barger     Patient Name:  Marino Perez                    :  1955     Restrictions/Precautions:  none, low fall risk  Diagnosis:  Swelling R hand                                                   Insurance/Certification information:  Medical [place]   Referring Practitioner:  Dr. Bhavana Wray  Referring Practitioner specific orders: eval and treat, modalities as needed  Date of Surgery/Injury: rotator cuff surgery   Plan of care signed (Y/N):  No  Visit# / total visits: Pain Level: 4 on scale of 1-10, aching    Subjective: \"Doctor states I have carpal tunnel and eventually needing surgery. \"     Objective:  Updated POC to be completed by 20  INTERVENTION: COMPLETED: SPECIFICS/COMMENTS:   Modality:               AROM:     Tendon glides x 10x   Wrist all planes x 15x   AAROM:               PROM/Stretching:     All digits x 10 min        Scar Mass/Edema Control:     Soft tissue/retrograde massage x 15 min        Strengthening:     Light gripper x 15x2   Putty and pegs x 10 min   Green flexbar x 15x2   digitflex red x 15x2   Other:                 Assessment/Comments: Pt is making Good progress toward stated plan of care. Improved active  noted. Weakness throughout forearm and hand noted. Shakiness observed with activities. Possible nerve involvement from shoulder surgery. Will cont to monitor.     -Rehab Potential: Good    -Requires OT Follow Up: Yes  Time In:4           Time Out: 5              Treatment Charges:/ Mins Units   Modalities/fluido 10 1   Ther Exercise 30 2   Manual Therapy 15 1   Thera Activities     ADL/Home Mgt      Neuro Re-education     Gait Training     Group Therapy     Non-Billable Service Time     Other     Total Time/Units 55 4     Goals: Goals for pt can be seen on initial evaluation.     Plan:   [x]  Continues Plan

## 2020-05-26 ENCOUNTER — HOSPITAL ENCOUNTER (OUTPATIENT)
Dept: OCCUPATIONAL THERAPY | Age: 65
Setting detail: THERAPIES SERIES
Discharge: HOME | End: 2020-05-26
Payer: MEDICARE

## 2020-05-26 PROCEDURE — 97110 THERAPEUTIC EXERCISES: CPT | Performed by: OCCUPATIONAL THERAPIST

## 2020-05-26 PROCEDURE — 97022 WHIRLPOOL THERAPY: CPT | Performed by: OCCUPATIONAL THERAPIST

## 2020-05-26 PROCEDURE — 97140 MANUAL THERAPY 1/> REGIONS: CPT | Performed by: OCCUPATIONAL THERAPIST

## 2020-05-29 ENCOUNTER — HOSPITAL ENCOUNTER (OUTPATIENT)
Dept: OCCUPATIONAL THERAPY | Age: 65
Setting detail: THERAPIES SERIES
Discharge: HOME | End: 2020-05-29
Payer: MEDICARE

## 2020-05-29 PROCEDURE — 97140 MANUAL THERAPY 1/> REGIONS: CPT | Performed by: OCCUPATIONAL THERAPIST

## 2020-05-29 PROCEDURE — 97022 WHIRLPOOL THERAPY: CPT | Performed by: OCCUPATIONAL THERAPIST

## 2020-05-29 PROCEDURE — 97110 THERAPEUTIC EXERCISES: CPT | Performed by: OCCUPATIONAL THERAPIST

## 2020-05-29 NOTE — PROGRESS NOTES
ability to complete flexion. Pt noted to have full fist by end of session with decreased pain noted in 3rd digit.     -Rehab Potential: Good  -Requires OT Follow Up: Yes   Time In:12:00 pm           Time Out: 1:00 pm            Treatment Charges:/ Mins Units   Modalities/fluido 15 1   Ther Exercise 25 2   Manual Therapy 20 1   Thera Activities     ADL/Home Mgt      Neuro Re-education     Gait Training     Group Therapy     Non-Billable Service Time     Other     Total Time/Units 60 4     Goals: Goals for pt can be seen on initial evaluation. Plan:   [x]  Continues Plan of care: Treatment covered based on POC and graduated to patient's progress. Pt education continues at each visit to obtain maximum benefits from skilled OT intervention.   []  Alter Plan of care:   []  Discharge:    Reinaldo Jaimes [address], OTR/L #[id number]

## 2020-06-02 ENCOUNTER — HOSPITAL ENCOUNTER (OUTPATIENT)
Dept: OCCUPATIONAL THERAPY | Age: 65
Setting detail: THERAPIES SERIES
Discharge: HOME | End: 2020-06-02
Payer: COMMERCIAL

## 2020-06-02 PROCEDURE — 97018 PARAFFIN BATH THERAPY: CPT | Performed by: OCCUPATIONAL THERAPIST

## 2020-06-02 PROCEDURE — 97140 MANUAL THERAPY 1/> REGIONS: CPT | Performed by: OCCUPATIONAL THERAPIST

## 2020-06-02 PROCEDURE — 97110 THERAPEUTIC EXERCISES: CPT | Performed by: OCCUPATIONAL THERAPIST

## 2020-06-02 NOTE — PROGRESS NOTES
in flexion and extension         Strengthening:     Light gripper  15x2   Putty /meatballs  10 min   Putty Strengthening x 15 reps -  and pull toward self.with soft resistance putty. Focus on full grasp on putty before pulling. Putty Tools x Doorknob and L-bar x 15 reps ea against soft resistance putty. 5# Wrist all planes x 2x15 reps ea - Wrist flex/ext, RD/UD, sup/pro   2.2# Ball Grasps w/ Ext x 2x15 Palm grasps with wrist ext. Green flexbar  15x2 supination, pronation and twisting   digitflex Green  15x2 supination, pronation and lateral   Other:                 Assessment/Comments: Pt is making Good progress toward stated plan of care. After completing PROM and place and holds, pt able to pull hand into full fist. Pt very pleased with results. Improved ROM of MF with great tolerance. To complete place and holds at home. Full fist remained throughout session, with stiffness toward the end of session from advanced strengthening with no pain reported. Continue advancing as tolerated. -Rehab Potential: Good  -Requires OT Follow Up: Yes   Time In: 4:00 pm           Time Out: 5:00 pm            Treatment Charges: Mins Units   Modalities/Paraffin 10 1   Ther Exercise 30 2   Manual Therapy 20 1   Thera Activities     ADL/Home Mgt      Neuro Re-education     Gait Training     Group Therapy     Non-Billable Service Time     Other     Total Time/Units 60 4     Goals: Goals for pt can be seen on initial evaluation. Plan:   [x]  Continues Plan of care: Treatment covered based on POC and graduated to patient's progress. Pt education continues at each visit to obtain maximum benefits from skilled OT intervention.   []  Alter Plan of care:   []  Discharge:    Vanessa Munoz OTR/JOEL #[id number]

## 2020-06-04 ENCOUNTER — HOSPITAL ENCOUNTER (OUTPATIENT)
Dept: OCCUPATIONAL THERAPY | Age: 65
Setting detail: THERAPIES SERIES
Discharge: HOME | End: 2020-06-04
Payer: COMMERCIAL

## 2020-06-04 PROCEDURE — 97110 THERAPEUTIC EXERCISES: CPT | Performed by: OCCUPATIONAL THERAPIST

## 2020-06-04 PROCEDURE — 97140 MANUAL THERAPY 1/> REGIONS: CPT | Performed by: OCCUPATIONAL THERAPIST

## 2020-06-04 PROCEDURE — 97018 PARAFFIN BATH THERAPY: CPT | Performed by: OCCUPATIONAL THERAPIST

## 2020-06-04 NOTE — PROGRESS NOTES
OCCUPATIONAL THERAPY PROGRESS NOTE    Date:  2020     Initial Evaluation Date: 20                                Evaluating Therapist: Briana Wylie     Patient Name:  Geovanni Kowalski                    :  1955     Restrictions/Precautions:  none, low fall risk  Diagnosis:  Swelling R hand                                                   Insurance/Certification information:  Medical [place]   Referring Practitioner:  Dr. Gabby Haynes  Referring Practitioner specific orders: eval and treat, modalities as needed  Date of Surgery/Injury: rotator cuff surgery   Plan of care signed (Y/N):  No  Visit# / total visits:      Pain Level: 2 on scale of 1-10, aching in his knuckles     Subjective: Pt reported \"I've been doing my tendon glides, I had a little bit of swelling this morning. I might have over done it painting the gym floor last night. \"      Objective:  Updated POC to be completed by 20  INTERVENTION: COMPLETED: SPECIFICS/COMMENTS:   Modality:     Fluidotherapy  15 mins with AROM encouraged. Paraffin x 10 mins for soft tissue warm up prior to PROM with MHP. AROM:     Wristisizer  x6 with elbow on table for wrist isolation. Tendon glides  x15 with therapist assistance for stretching 3rd digit during different positions. Wrist all planes  15x   AAROM:               PROM/Stretching:     Manual Nerve Gliding x Various techniques with prolong holds of ulnar and median nerve glides   PROM of Wrist and All digits x Specifically 3rd digit at the MCP and PIP due to stiffness and pain reported. Cirilo Rolls x 15 reps - Rolled marker from hook fist to flat fist.   Place and Holds x 12 reps - Placed hand into full fist, pt to hold x 10 sec and release into full extension actively. Pt to quickly place hand back into prior position to achieve same fist positioning. Good results, improved fist by end of session.    Putty Fist Roll/Palm Presses x 10 reps ea Using soft resistance putty for stretching of flexors, extensors, tendons, and joints. Scar Mass/Edema Control:     Soft tissue/retrograde massage x 10 min with slight stretching throughout in flexion and extension         Strengthening:     Light gripper  15x2   Putty /meatballs  10 min   Hand Gripper x 2x15 reps - 5 sec holds. Black spring at level 3 resistance. Putty Strengthening x 15 reps -  and pull toward self.with soft resistance putty. Focus on full grasp on putty before pulling. 15 reps -  and pull wrist up into ext for tenodesis effect. Putty Tools  Doorknob and L-bar x 15 reps ea against soft resistance putty. 5# Wrist all planes  2x15 reps ea - Wrist flex/ext, RD/UD, sup/pro   2.2# Ball Grasps w/ Ext x 2x15 Palm grasps with wrist ext. Green flexbar  15x2 supination, pronation and twisting   digitflex Green  15x2 supination, pronation and lateral   Other:                 Assessment/Comments: Pt is making Good progress toward stated plan of care. Full fist achieved by mid session. Significant improvement in strength this date. Good tolerance for all exercises, continues with mild swelling and stiffness/mild aching in PIPs. 40#  strength measured this date in R hand.    -Rehab Potential: Good  -Requires OT Follow Up: Yes   Time In: 3:50 pm           Time Out: 4:50 pm            Treatment Charges: Mins Units   Modalities/Paraffin 10 1   Ther Exercise 30 2   Manual Therapy 20 1   Thera Activities     ADL/Home Mgt      Neuro Re-education     Gait Training     Group Therapy     Non-Billable Service Time     Other     Total Time/Units 60 4     Goals: Goals for pt can be seen on initial evaluation. Plan:   [x]  Continues Plan of care: Treatment covered based on POC and graduated to patient's progress. Pt education continues at each visit to obtain maximum benefits from skilled OT intervention.   []  Alter Plan of care:   []  Discharge:    Nunu Fontana, OTR/L #[id number]

## 2020-06-08 ENCOUNTER — HOSPITAL ENCOUNTER (OUTPATIENT)
Dept: OCCUPATIONAL THERAPY | Age: 65
Setting detail: THERAPIES SERIES
Discharge: HOME | End: 2020-06-08
Payer: COMMERCIAL

## 2020-06-08 PROCEDURE — 97022 WHIRLPOOL THERAPY: CPT | Performed by: OCCUPATIONAL THERAPIST

## 2020-06-08 PROCEDURE — 97110 THERAPEUTIC EXERCISES: CPT | Performed by: OCCUPATIONAL THERAPIST

## 2020-06-08 PROCEDURE — 97140 MANUAL THERAPY 1/> REGIONS: CPT | Performed by: OCCUPATIONAL THERAPIST

## 2020-06-11 ENCOUNTER — HOSPITAL ENCOUNTER (OUTPATIENT)
Dept: OCCUPATIONAL THERAPY | Age: 65
Setting detail: THERAPIES SERIES
Discharge: HOME | End: 2020-06-11
Payer: COMMERCIAL

## 2020-06-11 PROCEDURE — 97022 WHIRLPOOL THERAPY: CPT | Performed by: OCCUPATIONAL THERAPIST

## 2020-06-11 PROCEDURE — 97110 THERAPEUTIC EXERCISES: CPT | Performed by: OCCUPATIONAL THERAPIST

## 2020-06-11 PROCEDURE — 97140 MANUAL THERAPY 1/> REGIONS: CPT | Performed by: OCCUPATIONAL THERAPIST

## 2020-06-11 NOTE — PROGRESS NOTES
OCCUPATIONAL THERAPY PROGRESS NOTE    Date:  2020     Initial Evaluation Date: 20                                Evaluating Therapist: Sandor Lew     Patient Name:  Silvia Coleman                    :  1955     Restrictions/Precautions:  none, low fall risk  Diagnosis:  Swelling R hand                                                   Insurance/Certification information:  Medical [place]   Referring Practitioner:  Dr. Joao Malone  Referring Practitioner specific orders: eval and treat, modalities as needed  Date of Surgery/Injury: rotator cuff surgery   Plan of care signed (Y/N):  No  Visit# / total visits:      Pain Level: 2 on scale of 1-10, aching in his knuckles     Subjective: Pt reported \"hand and arm is feeling stronger. Really feel the weakness is from my shoulder surgery. Syd[place] \"   Objective:  Updated POC to be completed by 20  INTERVENTION: COMPLETED: SPECIFICS/COMMENTS:   Modality:     Fluidotherapy x 15 mins with AROM encouraged. Paraffin  10 mins for soft tissue warm up prior to PROM with MHP. AROM:     Wristisizer  x6 with elbow on table for wrist isolation. Tendon glides  x15 with therapist assistance for stretching 3rd digit during different positions. Wrist all planes x 15x   AAROM:               PROM/Stretching:     Manual Nerve Gliding x Various techniques with prolong holds of ulnar and median nerve glides   PROM of Wrist and All digits x Specifically 3rd digit at the MCP and PIP due to stiffness and pain reported. Cirilo Rolls x 15 reps - Rolled marker from hook fist to flat fist.   Place and Holds x 12 reps - Placed hand into full fist, pt to hold x 10 sec and release into full extension actively. Pt to quickly place hand back into prior position to achieve same fist positioning. Good results, improved fist by end of session.    Putty Fist Roll/Palm Presses x 10 reps ea Using soft resistance putty for stretching of flexors, extensors, tendons, and

## 2020-06-15 ENCOUNTER — HOSPITAL ENCOUNTER (OUTPATIENT)
Dept: OCCUPATIONAL THERAPY | Age: 65
Setting detail: THERAPIES SERIES
Discharge: HOME | End: 2020-06-15
Payer: COMMERCIAL

## 2020-06-15 PROCEDURE — 97140 MANUAL THERAPY 1/> REGIONS: CPT | Performed by: OCCUPATIONAL THERAPIST

## 2020-06-15 PROCEDURE — 97110 THERAPEUTIC EXERCISES: CPT | Performed by: OCCUPATIONAL THERAPIST

## 2020-06-15 PROCEDURE — 97022 WHIRLPOOL THERAPY: CPT | Performed by: OCCUPATIONAL THERAPIST

## 2020-06-15 NOTE — PROGRESS NOTES
OCCUPATIONAL THERAPY PROGRESS NOTE    Date:  6/15/2020     Initial Evaluation Date: 20                                Evaluating Therapist: Royce Connelly     Patient Name:  Tj Bain                    :  1955     Restrictions/Precautions:  none, low fall risk  Diagnosis:  Swelling R hand                                                   Insurance/Certification information:  Medical [place]   Referring Practitioner:  Dr. Pili Muller  Referring Practitioner specific orders: eval and treat, modalities as needed  Date of Surgery/Injury: rotator cuff surgery   Plan of care signed (Y/N):  No  Visit# / total visits:      Pain Level: 2 on scale of 1-10, aching in his knuckles     Subjective: Pt reported \"Progress is slow but cont's. Using hand for everything now \"   Objective:  Updated POC to be completed by 20  INTERVENTION: COMPLETED: SPECIFICS/COMMENTS:   Modality:     Fluidotherapy x 15 mins with AROM encouraged. Paraffin  10 mins for soft tissue warm up prior to PROM with MHP. AROM:     Wristisizer  x6 with elbow on table for wrist isolation. Tendon glides  x15 with therapist assistance for stretching 3rd digit during different positions. Wrist all planes x 15x   AAROM:               PROM/Stretching:     Manual Nerve Gliding x Various techniques with prolong holds of ulnar and median nerve glides   PROM of Wrist and All digits x Specifically 3rd digit at the MCP and PIP due to stiffness and pain reported. Cirilo Rolls x 15 reps - Rolled marker from hook fist to flat fist.   Place and Holds x 12 reps - Placed hand into full fist, pt to hold x 10 sec and release into full extension actively. Pt to quickly place hand back into prior position to achieve same fist positioning. Good results, improved fist by end of session. Putty Fist Roll/Palm Presses x 10 reps ea Using soft resistance putty for stretching of flexors, extensors, tendons, and joints.    Scar Mass/Edema Control: Soft tissue/retrograde massage x 10 min with slight stretching throughout in flexion and extension         Strengthening:     Light gripper  15x2   Putty /meatballs x 10 min   Hand Gripper x 2x15 reps - 5 sec holds. Black spring at level 3 resistance. 2# shoulder flex/abd to 90 degrees. tricep 2# , press ups 15x2 x Weakness and trembling is observed in shoulder and tricep area. Putty Strengthening x 15 reps -  and pull toward self.with soft resistance putty. Focus on full grasp on putty before pulling. 15 reps -  and pull wrist up into ext for tenodesis effect. Putty Tools  Doorknob and L-bar x 15 reps ea against soft resistance putty. 5# Wrist all planes x 2x15 reps ea - Wrist flex/ext, RD/UD, sup/pro   2.2# [place] w/ Ext  2x15 Palm grasps with wrist ext. Green flexbar x 15x2 supination, pronation and twisting   digitflex Green x 15x2 supination, pronation and lateral   Other:                 Assessment/Comments: Pt is making Good progress toward stated plan of care. Weakness in hand and arm presents from shoulder surgery. Strength and endurance is progressing. Less shininess is observed in the right hand with improved nerve function.   -Rehab Potential: Good  -Requires OT Follow Up: Yes   Time In: 3:30 pm           Time Out: 4:30 pm            Treatment Charges: Mins Units   Modalities/fluido 10 1   Ther Exercise 30 2   Manual Therapy 20 1   Thera Activities     ADL/Home Mgt      Neuro Re-education     Gait Training     Group Therapy     Non-Billable Service Time     Other     Total Time/Units 60 4     Goals: Goals for pt can be seen on initial evaluation. Plan:   [x]  Continues Plan of care: Treatment covered based on POC and graduated to patient's progress. Pt education continues at each visit to obtain maximum benefits from skilled OT intervention.   []  Alter Plan of care:   []  Discharge:  Talita Blount OT/L, [address] [place] [address]

## 2020-06-18 ENCOUNTER — HOSPITAL ENCOUNTER (OUTPATIENT)
Dept: OCCUPATIONAL THERAPY | Age: 65
Setting detail: THERAPIES SERIES
Discharge: HOME | End: 2020-06-18
Payer: COMMERCIAL

## 2020-06-18 PROCEDURE — 97110 THERAPEUTIC EXERCISES: CPT | Performed by: OCCUPATIONAL THERAPIST

## 2020-06-18 PROCEDURE — 97140 MANUAL THERAPY 1/> REGIONS: CPT | Performed by: OCCUPATIONAL THERAPIST

## 2020-06-18 PROCEDURE — 97018 PARAFFIN BATH THERAPY: CPT | Performed by: OCCUPATIONAL THERAPIST

## 2020-06-18 NOTE — PROGRESS NOTES
OCCUPATIONAL THERAPY PROGRESS NOTE    Date:  2020     Initial Evaluation Date: 20                                Evaluating Therapist: Sigifredo Smart     Patient Name:  Kush Fernandes                    :  1955     Restrictions/Precautions:  none, low fall risk  Diagnosis:  Swelling R hand                                                   Insurance/Certification information:  Medical [place]   Referring Practitioner:  Dr. Bibi Aleman  Referring Practitioner specific orders: eval and treat, modalities as needed  Date of Surgery/Injury: rotator cuff surgery   Plan of care signed (Y/N):  No  Visit# / total visits: 10 / 18     Pain Level: 2 on scale of 1-10, aching in his knuckles     Subjective: Pt reported \"Progress cont's but is slow. Feeling my muscles are coming back. \"  \"   Objective:  Updated POC to be completed by 20  INTERVENTION: COMPLETED: SPECIFICS/COMMENTS:   Modality:     Fluidotherapy x 15 mins with AROM encouraged. Paraffin  10 mins for soft tissue warm up prior to PROM with MHP. AROM:     Wristisizer  x6 with elbow on table for wrist isolation. Tendon glides  x15 with therapist assistance for stretching 3rd digit during different positions. Wrist all planes x 15x   AAROM:               PROM/Stretching:     Manual Nerve Gliding x Various techniques with prolong holds of ulnar and median nerve glides   PROM of Wrist and All digits x Specifically 3rd digit at the MCP and PIP due to stiffness and pain reported. Cirilo Rolls x 15 reps - Rolled marker from hook fist to flat fist.   Place and Holds x 12 reps - Placed hand into full fist, pt to hold x 10 sec and release into full extension actively. Pt to quickly place hand back into prior position to achieve same fist positioning. Good results, improved fist by end of session. Putty Fist Roll/Palm Presses x 10 reps ea Using soft resistance putty for stretching of flexors, extensors, tendons, and joints.    Scar

## 2020-06-22 ENCOUNTER — HOSPITAL ENCOUNTER (OUTPATIENT)
Dept: OCCUPATIONAL THERAPY | Age: 65
Setting detail: THERAPIES SERIES
Discharge: HOME | End: 2020-06-22
Payer: COMMERCIAL

## 2020-06-22 PROCEDURE — 97140 MANUAL THERAPY 1/> REGIONS: CPT | Performed by: OCCUPATIONAL THERAPIST

## 2020-06-22 PROCEDURE — 97110 THERAPEUTIC EXERCISES: CPT | Performed by: OCCUPATIONAL THERAPIST

## 2020-06-22 PROCEDURE — 97022 WHIRLPOOL THERAPY: CPT | Performed by: OCCUPATIONAL THERAPIST

## 2020-06-22 NOTE — PROGRESS NOTES
reps ea Using soft resistance putty for stretching of flexors, extensors, tendons, and joints. Scar Mass/Edema Control:     Soft tissue/retrograde massage x 10 min/Shoulder bicep tendon soreness noted, forearm/hand        Strengthening:     Light gripper  15x2   Putty /meatballs x 10 min   Hand Gripper x 2x15 reps - 5 sec holds. Black spring at level 3 resistance. 2# shoulder flex/abd to 90 degrees. tricep 2# , press ups 15x2  Weakness and trembling is observed in shoulder and tricep area. Putty Strengthening x 15 reps -  and pull toward self.with soft resistance putty. Focus on full grasp on putty before pulling. 15 reps -  and pull wrist up into ext for tenodesis effect. Putty Tools  Doorknob and L-bar x 15 reps ea against soft resistance putty. 5# Wrist all planes  2x15 reps ea - Wrist flex/ext, RD/UD, sup/pro   2.2# [place] w/ Ext  2x15 Palm grasps with wrist ext. Green flexbar  15x2 supination, pronation and twisting   digitflex Green x 15x2 supination, pronation and lateral   Other:                 Assessment/Comments: Pt is making Good progress toward stated plan of care. Presents with pain in bicep tendon from painting. Some increased tightness in the R hand also noted. Improved  after stretches and some less pain reported. Less strengthening performed today secondary to pain. Pt. To cont stretching and icing shoulder at home. .  -Rehab Potential: Good  -Requires OT Follow Up: Yes   Time In: 3:30 pm           Time Out: 4:30 pm            Treatment Charges: Mins Units   Modalities/fluido 10 1   Ther Exercise 30 2   Manual Therapy 20 1   Thera Activities     ADL/Home Mgt      Neuro Re-education     Gait Training     Group Therapy     Non-Billable Service Time     Other     Total Time/Units 60 4     Goals: Goals for pt can be seen on initial evaluation. Plan:   [x]  Continues Plan of care: Treatment covered based on POC and graduated to patient's progress.  Pt education continues at each visit to obtain maximum benefits from skilled OT intervention.   []  Alter Plan of care:   []  Discharge:  Anival Pelaez OT/, [address] [place] [address]

## 2020-06-25 ENCOUNTER — HOSPITAL ENCOUNTER (OUTPATIENT)
Dept: OCCUPATIONAL THERAPY | Age: 65
Setting detail: THERAPIES SERIES
Discharge: HOME | End: 2020-06-25
Payer: COMMERCIAL

## 2020-06-25 PROCEDURE — 97022 WHIRLPOOL THERAPY: CPT | Performed by: OCCUPATIONAL THERAPIST

## 2020-06-25 PROCEDURE — 97110 THERAPEUTIC EXERCISES: CPT | Performed by: OCCUPATIONAL THERAPIST

## 2020-06-25 PROCEDURE — 97140 MANUAL THERAPY 1/> REGIONS: CPT | Performed by: OCCUPATIONAL THERAPIST

## 2020-06-25 NOTE — PROGRESS NOTES
OCCUPATIONAL THERAPY PROGRESS NOTE/REASSESSMENT    Date:  2020     Initial Evaluation Date: 20                                Evaluating Therapist: Nba Suresh     Patient Name:  Janice Snyder                    :  1955     Restrictions/Precautions:  none, low fall risk  Diagnosis:  Swelling R hand                                                   Insurance/Certification information:  Medical [place]   Referring Practitioner:  Dr. Eloina José  Referring Practitioner specific orders: eval and treat, modalities as needed  Date of Surgery/Injury: rotator cuff surgery   Plan of care signed (Y/N):  No  Visit# / total visits:      Pain Level: 2 on scale of 1-10, aching in his knuckles     Subjective: Pt reported \"painted a couple walls and now my shoulder and hand are really hurting and I'm unable to close my hand as tight. Something in my shoulder is not right causing these issues. \"  \"   Objective:  Updated POC to be completed by 20  INTERVENTION: COMPLETED: SPECIFICS/COMMENTS:   Modality:     Fluidotherapy x 15 mins with AROM encouraged. Paraffin  10 mins for soft tissue warm up prior to PROM with MHP. AROM:     Wristisizer  x6 with elbow on table for wrist isolation. Tendon glides  x15 with therapist assistance for stretching 3rd digit during different positions. Wrist all planes x 15x   AAROM:               PROM/Stretching:     Manual Nerve Gliding x Various techniques with prolong holds of ulnar and median nerve glides   PROM of Wrist and All digits x Specifically 3rd digit at the MCP and PIP due to stiffness and pain reported. Cirilo Rolls x 15 reps - Rolled marker from hook fist to flat fist.   Place and Holds x 12 reps - Placed hand into full fist, pt to hold x 10 sec and release into full extension actively. Pt to quickly place hand back into prior position to achieve same fist positioning. Good results, improved fist by end of session.    Putty Fist Roll/Palm Presses x 10 reps ea Using soft resistance putty for stretching of flexors, extensors, tendons, and joints. Scar Mass/Edema Control:     Soft tissue/retrograde massage x 10 min/Shoulder bicep tendon soreness noted, forearm/hand        Strengthening:     Light gripper  15x2   Putty /meatballs x 10 min   Hand Gripper x 2x15 reps - 5 sec holds. Black spring at level 3 resistance. 2# shoulder flex/abd to 90 degrees. tricep 2# , press ups 15x2  Weakness and trembling is observed in shoulder and tricep area. Putty Strengthening x 15 reps -  and pull toward self.with soft resistance putty. Focus on full grasp on putty before pulling. 15 reps -  and pull wrist up into ext for tenodesis effect. Putty Tools  Doorknob and L-bar x 15 reps ea against soft resistance putty. 5# Wrist all planes  2x15 reps ea - Wrist flex/ext, RD/UD, sup/pro   2.2# Katrinka [place] w/ Ext  2x15 Palm grasps with wrist ext. Green flexbar  15x2 supination, pronation and twisting   digitflex Green x 15x2 supination, pronation and lateral   Other:                 Re-Assessment/Comments: Pt is making Good progress toward stated plan of care. . Overall good progress however past week some increased swelling and pain, stiffness in shoulder and hand secondary to painting at work.    6/25/20   Edema Description/Circumferential Measurements: goal partially met             MILD- Mod swelling  Dynamometer (setting 2):   Goal partially met                         Left: 82#                                               Right: 35#                     Pinch Meter: goal met              Lateral: Left= 15# ,Right= 15#              Palmar 3 point: Left= 16#, Right= 16#  9 Hole Peg Test: goal met              Left: 20 sec              Right: 25 sec     -Rehab Potential: Good  -Requires OT Follow Up: Yes   Time In: 3:30 pm           Time Out: 4:30 pm            Treatment Charges: Mins Units   Modalities/fluido 10 1   Ther Exercise 30 2   Manual Therapy 20

## 2020-06-29 ENCOUNTER — HOSPITAL ENCOUNTER (OUTPATIENT)
Dept: OCCUPATIONAL THERAPY | Age: 65
Setting detail: THERAPIES SERIES
Discharge: HOME | End: 2020-06-29
Payer: COMMERCIAL

## 2020-06-29 PROCEDURE — 97110 THERAPEUTIC EXERCISES: CPT | Performed by: OCCUPATIONAL THERAPIST

## 2020-06-29 PROCEDURE — 97140 MANUAL THERAPY 1/> REGIONS: CPT | Performed by: OCCUPATIONAL THERAPIST

## 2020-06-29 PROCEDURE — 97022 WHIRLPOOL THERAPY: CPT | Performed by: OCCUPATIONAL THERAPIST

## 2020-07-02 ENCOUNTER — HOSPITAL ENCOUNTER (OUTPATIENT)
Dept: OCCUPATIONAL THERAPY | Age: 65
Setting detail: THERAPIES SERIES
Discharge: HOME | End: 2020-07-02
Payer: MEDICARE

## 2020-07-02 PROCEDURE — 97140 MANUAL THERAPY 1/> REGIONS: CPT | Performed by: OCCUPATIONAL THERAPIST

## 2020-07-02 PROCEDURE — 97022 WHIRLPOOL THERAPY: CPT | Performed by: OCCUPATIONAL THERAPIST

## 2020-07-02 PROCEDURE — 97110 THERAPEUTIC EXERCISES: CPT | Performed by: OCCUPATIONAL THERAPIST

## 2020-07-02 NOTE — PROGRESS NOTES
OCCUPATIONAL THERAPY PROGRESS NOTE/REASSESSMENT    Date:  2020     Initial Evaluation Date: 20                                Evaluating Therapist: Gisell Chavez     Patient Name:  Luz Lees                    :  1955     Restrictions/Precautions:  none, low fall risk  Diagnosis:  Swelling R hand                                                   Insurance/Certification information:  Medical [place]   Referring Practitioner:  Dr. Mark Moctezuma  Referring Practitioner specific orders: eval and treat, modalities as needed  Date of Surgery/Injury: rotator cuff surgery   Plan of care signed (Y/N):  No  Visit# / total visits:      Pain Level: 2 on scale of 1-10, aching in his knuckles     Subjective: Pt reported \"having a lot of swelling in the morming and feeling like I should do the surgery for carpal tunnel\"  \"   Objective:  Updated POC to be completed by 20  INTERVENTION: COMPLETED: SPECIFICS/COMMENTS:   Modality:     Fluidotherapy x 15 mins with AROM encouraged. Paraffin  10 mins for soft tissue warm up prior to PROM with MHP. AROM:     Wristisizer  x6 with elbow on table for wrist isolation. Tendon glides  x15 with therapist assistance for stretching 3rd digit during different positions. Wrist all planes x 15x   AAROM:               PROM/Stretching:     Manual Nerve Gliding x Various techniques with prolong holds of ulnar and median nerve glides   PROM of Wrist and All digits x Specifically 3rd digit at the MCP and PIP due to stiffness and pain reported. Cirilo Rolls x 15 reps - Rolled marker from hook fist to flat fist.   Place and Holds x 12 reps - Placed hand into full fist, pt to hold x 10 sec and release into full extension actively. Pt to quickly place hand back into prior position to achieve same fist positioning. Good results, improved fist by end of session.    Putty Fist Roll/Palm Presses x 10 reps ea Using soft resistance putty for stretching of flexors, Activities     ADL/Home Mgt      Neuro Re-education     Gait Training     Group Therapy     Non-Billable Service Time     Other     Total Time/Units 60 4     Goals: Goals for pt can be seen on initial evaluation. Plan:   [x]  Continues Plan of care: Treatment covered based on POC and graduated to patient's progress. Pt education continues at each visit to obtain maximum benefits from skilled OT intervention.   []  Alter Plan of care:   []  Discharge:  Gayle Burn OT/L, [address] [place] [address]

## 2020-07-07 ENCOUNTER — HOSPITAL ENCOUNTER (OUTPATIENT)
Dept: OCCUPATIONAL THERAPY | Age: 65
Setting detail: THERAPIES SERIES
Discharge: HOME | End: 2020-07-07
Payer: MEDICARE

## 2020-07-07 PROCEDURE — 97110 THERAPEUTIC EXERCISES: CPT | Performed by: OCCUPATIONAL THERAPIST

## 2020-07-07 PROCEDURE — 97140 MANUAL THERAPY 1/> REGIONS: CPT | Performed by: OCCUPATIONAL THERAPIST

## 2020-07-07 PROCEDURE — 97165 OT EVAL LOW COMPLEX 30 MIN: CPT | Performed by: OCCUPATIONAL THERAPIST

## 2020-07-07 NOTE — PROGRESS NOTES
OCCUPATIONAL THERAPY PROGRESS NOTE/DISCHARGE    Date:  2020     Initial Evaluation Date: 20                                Evaluating Therapist: Alexandra Arora     Patient Name:  Akbar Mac                    :  1955     Restrictions/Precautions:  none, low fall risk  Diagnosis:  Swelling R hand                                                   Insurance/Certification information:  Medical [place]   Referring Practitioner:  Dr. Jamaal Licona  Referring Practitioner specific orders: eval and treat, modalities as needed  Date of Surgery/Injury: rotator cuff surgery   Plan of care signed (Y/N):  No  Visit# / total visits:      Pain Level: 2 on scale of 1-10, aching in his knuckles     Subjective: Pt reported \"My hand is getting worse and I think I need that carpal tunnel surgery\"     Objective:  Updated POC to be completed by 20  INTERVENTION: COMPLETED: SPECIFICS/COMMENTS:   Modality:     Fluidotherapy x 15 mins with AROM encouraged. Paraffin  10 mins for soft tissue warm up prior to PROM with MHP. AROM:     Wristisizer  x6 with elbow on table for wrist isolation. Tendon glides  x15 with therapist assistance for stretching 3rd digit during different positions. Wrist all planes x 15x   AAROM:               PROM/Stretching:     Manual Nerve Gliding x Various techniques with prolong holds of ulnar and median nerve glides   PROM of Wrist and All digits x Specifically 3rd digit at the MCP and PIP due to stiffness and pain reported. Cirilo Rolls x 15 reps - Rolled marker from hook fist to flat fist.   Place and Holds x 12 reps - Placed hand into full fist, pt to hold x 10 sec and release into full extension actively. Pt to quickly place hand back into prior position to achieve same fist positioning. Good results, improved fist by end of session. Putty Fist Roll/Palm Presses x 10 reps ea Using soft resistance putty for stretching of flexors, extensors, tendons, and joints. Potential: Good  -Requires OT Follow Up: Yes   Time In: 3:30 pm           Time Out: 4:30 pm            Treatment Charges: Mins Units   Modalities/fluido 10 1   Ther Exercise 30 2   Manual Therapy 20 1   Thera Activities     ADL/Home Mgt      Neuro Re-education     Gait Training     Group Therapy     Non-Billable Service Time     Other     Total Time/Units 60 4     Goals: Goals for pt can be seen on initial evaluation. Plan:   []  Continues Plan of care: Treatment covered based on POC and graduated to patient's progress. Pt education continues at each visit to obtain maximum benefits from skilled OT intervention.   []  Alter Plan of care:   [x]  Discharge:  Gisell Chavez OT/L, [address] [place] [address]

## 2020-07-09 ENCOUNTER — APPOINTMENT (OUTPATIENT)
Dept: OCCUPATIONAL THERAPY | Age: 65
End: 2020-07-09
Payer: MEDICARE

## 2020-08-03 ENCOUNTER — HOSPITAL ENCOUNTER (OUTPATIENT)
Dept: OCCUPATIONAL THERAPY | Age: 65
Setting detail: THERAPIES SERIES
Discharge: HOME | End: 2020-08-03
Payer: MEDICARE

## 2020-08-03 PROCEDURE — 97140 MANUAL THERAPY 1/> REGIONS: CPT | Performed by: OCCUPATIONAL THERAPIST

## 2020-08-03 PROCEDURE — 97165 OT EVAL LOW COMPLEX 30 MIN: CPT | Performed by: OCCUPATIONAL THERAPIST

## 2020-08-03 PROCEDURE — 97110 THERAPEUTIC EXERCISES: CPT | Performed by: OCCUPATIONAL THERAPIST

## 2020-08-03 NOTE — PROGRESS NOTES
OCCUPATIONAL THERAPY INITIAL EVALUATION    [place]  SEYZ OT Anuj [address], [address] [facility] [address]  Dept: [id number] 66 29: [phone]   SEAT Fax: [phone]    Date:  8/3/2020  Initial Evaluation Date: 8/3/20   Evaluating Therapist: Abdias Luna    Patient Name:  Vini Duncan    :  1955    Restrictions/Precautions:  none, low fall risk  Diagnosis:  Swelling R hand /CTR R       Insurance/Certification information:  [place] [employer]  Referring Practitioner:  Dr. Chilango Benavides  Referring Practitioner specific orders: eval and treat. Edema control, etc, rom  Date of Surgery/Injury: CTR 20  Plan of care signed (Y/N):  No  Visit# / total visits:     Past Medical History:   Past Medical History:   Diagnosis Date    Chest pain 10-    exercise nuclear stress test    Depression     Diabetes mellitus ([facility] [address]      Past Surgical History:   Past Surgical History:   Procedure Laterality Date    COLONOSCOPY      ENDOSCOPY, COLON, DIAGNOSTIC      FRACTURE SURGERY Right 2015    spiral fx    TONSILLECTOMY      TYMPANOPLASTY      bilateral       Reason for Referral: Pt. Reports secondary to cont'ing to have trouble with swelling , pain, decreased , weakness, pt. underwent surgery for CTR. Since surgery he complains of sign swelling, decreased  and decreased functional use of the right hand.    Home Living: Pt. Lives with girlfriend  Prior Level of Function: independent    Cognition:   Alert/Oriented x3     IADL STATUS:      Ind  Mod I  Min A  Mod A  Max A  Dep  N/A    Homemaking Responsibility:  []   [x]   []   []   []   []  []  Shopping Responsibility:  []   [x]   []   []   []   []  []  Mode of Transportation:  [x]   []   []   []   []   []  []  Leisure & Hobbies:   []   []   []   [x]   []   []  []  Work:     []   []   []   []   []   []  [x]  Comments:     ADL STATUS:    Ind      Mod I    Min A  Mod A  Max A  Dep  N/A  Feeding:  [x]   []   [] []   []   []  []  Grooming:  []   [x]   []   []   []   []  []  Bathing:  []   [x]   []   []   []   []  []  UE Dressing:  []   [x]   []   []   []   []  []  LE Dressing:  []   [x]   []   []   []   []  []  Toileting:  []   [x]   []   []   []   []  []  Transfer:  [x]   []   []   []   []   []  []  Comments:    Pain Level: 0-7, aching , shooting pains which come and go. UE Assessment:  Pt. Demonstrates approx 1/4-1/2 range of an active fist with 3/4 active finger ext. Opposition to P2 only. No sign atrophy noted. Mod swelling noted throughout hand and wrist.  Comment: Hand Dominance is R  Active abd/add: difficulty add small digit. Sensation: R hand  SEMMES-JUANITA Median N Ulnar N Radial N Other   Normal Touch  Size: 2.83 WNL WNL     Diminished Light Touch   Size: 3.61 All tips All tips     Diminished Protective Sense  Size: 4.31       Loss of Protective Sense   Size: 4.56       Loss of Sensation  Size: 6.65         Edema Description/Circumferential Measurements: Mod swelling  Dynamometer (setting 2):     Left: 82#      Right: deferred will measure next week    Pinch Meter:   Lateral: Left= 15# ,Right= TBA#   Palmar 3 point: Left= 12#, Right= TBA#  9 Hole Peg Test:   Left: 20 sec   Right: 1 min 20 sec    QuickDASH Score: (Scale 100% full disability, 0 no disability): 75%    Intervention: fluido, arom, prom, soft tissue mobilization, beginning hep provided.      Assessment of current deficits   Functional mobility [x]  ADLs [x] Strength [x]  Cognition []  Functional transfers  [] IADLs [] Safety Awareness [x]  Endurance [x]  Fine Motor Coordination [x] Balance [] Vision/perception [] Sensation []   Gross Motor Coordination [] ROM [x]     Eval Complexity: low  Profile and History- low  Assessment of Occupational Performance and Identification of Deficits- low   Clinical Decision Making- low    Rehab Potential:                                 [x] Good  [] Fair  [] Poor        Suggested Professional Referral: [x] No  [] Yes:  Barriers to Goal Achievement[de-identified]          [x] No  [] Yes:  Domestic Concerns:                           [x] No  [] Yes:     Goal Formulation: Patient  Time In: 1           Time Out:2                      Timed Code Treatment Minutes: 60 minutes        PLAN      Treatment to include:   [x] Instruction in HEP                   Modalities:  [x] Therapeutic Exercise        [x] Ultrasound               [] Electrical Stimulation/Attended  [x] PROM/Stretching                    [x] Fluidotherapy          [x]  Paraffin                   [x] AAROM  [x] AROM                 [] Iontophoresis: 4 mg/mL; Dexamethasone Sodium                                        [] Neuromuscular Re-education [] Splinting         [] Desensitization          [x] Therapeutic Activity       [] Pain Management with/without modalities PRN                 [x] Manual Therapy/Fascial release                            [x] Tendon Glides        [x]Joint Protection/Training  []Ergonomics                             [x] Joint Mobilization        [] Adaptive Equipment Assessment/Training                             [x] Manual Edema Mobilization    [] Energy Conservation/Work Simplification  [x] GM/FM Coordination       [] Safety retraining/education per  individual diagnosis/goals  [x] Scar Management        [x] ADL/IADL re-training       Patient Specific Goal: Normal use of my right hand                             GOALS (Long term same as Short term):  1) Patient will demonstrate good understanding of home program (exercises/activities/diagnosis/prognosis/goals) with good accuracy. 2) Patient will demonstrate increased active/passive range of motion of their affected extremity/hand to [facility] for ADL/IADL completion. 3) Patient will demonstrate increased /pinch strength of at least 10 / 5 pinch pounds of their affected hand.  Will measure next week  4) Patient to report decreased pain in their affected hand/upper extremity from 2-5/10 to 0-2/10 or less with resistive functional use. 5) Increase in fine motor function as evidenced by decreased time to complete 9-hole peg test and/or MRMT test by at least 30 seconds. 6) Patient will be knowledgeable of edema control techniques as evident with decreases from mod to none. 7) Patient will report ADL functions same as prior to diagnosis of swelling R hand, CTR  8) Patient will demonstrate improved functional activity tolerance from assist/mod ind to ind for ADL/IADL completion. 9) Patient will decrease QuickDASH score by 40% for increased participation in daily functional activities. Patient. Education:  [x] Plans/Goals, Risks/Benefits discussed  [x] Home exercise program  Method of Education: [x] Verbal  [x] Demo  [] Written  Comprehension of Education:  [x] Verbalizes understanding. [x] Demonstrates understanding. [] Needs Review. [] Demonstrates/verbalizes understanding of HEP/Ed previously given. Patient understands diagnosis/prognosis and consents to treatment, plan and goals: [x] Yes    [] No      Electronically signed by: Harvinder Abebe OT/L, T [id number]        [id number] Certification / Comments      Frequency/Duration 2-3 / week for 18visits. Certification period From: present  To: 10/3/20     I have reviewed the Plan of Care established for skilled therapy services and certify that the services are required and that they will be provided while the patient is under my care. Physician's Comments/Revisions:                   Physicians's Printed Name:  Dr. Елена Mccoy                                Physician's Signature:                                                               Date:       Please review Patient's OT evaluation and if you agree sign/date and fax back to us at our [address] [place] [address] [place] Fax: [phone].  Thank you for your referral!

## 2020-08-05 ENCOUNTER — HOSPITAL ENCOUNTER (OUTPATIENT)
Dept: OCCUPATIONAL THERAPY | Age: 65
Setting detail: THERAPIES SERIES
Discharge: HOME | End: 2020-08-05
Payer: MEDICARE

## 2020-08-05 PROCEDURE — 97140 MANUAL THERAPY 1/> REGIONS: CPT | Performed by: OCCUPATIONAL THERAPIST

## 2020-08-05 PROCEDURE — 97110 THERAPEUTIC EXERCISES: CPT | Performed by: OCCUPATIONAL THERAPIST

## 2020-08-05 PROCEDURE — 97022 WHIRLPOOL THERAPY: CPT | Performed by: OCCUPATIONAL THERAPIST

## 2020-08-06 ENCOUNTER — HOSPITAL ENCOUNTER (OUTPATIENT)
Age: 65
Discharge: HOME | End: 2020-08-08
Payer: COMMERCIAL

## 2020-08-06 LAB
ALBUMIN SERPL-MCNC: 4.5 G/DL (ref 3.5–5.2)
ALP BLD-CCNC: 89 U/L (ref 40–129)
ALT SERPL-CCNC: 37 U/L (ref 0–40)
ANION GAP SERPL CALCULATED.3IONS-SCNC: 12 MMOL/L (ref 7–16)
AST SERPL-CCNC: 22 U/L (ref 0–39)
BASOPHILS ABSOLUTE: 0.09 E9/L (ref 0–0.2)
BASOPHILS RELATIVE PERCENT: 1.2 % (ref 0–2)
BILIRUB SERPL-MCNC: 0.4 MG/DL (ref 0–1.2)
BUN BLDV-MCNC: 13 MG/DL (ref 8–23)
CALCIUM SERPL-MCNC: 9.8 MG/DL (ref 8.6–10.2)
CHLORIDE BLD-SCNC: 102 MMOL/L (ref 98–107)
CHOLESTEROL, TOTAL: 161 MG/DL (ref 0–199)
CO2: 25 MMOL/L (ref 22–29)
CREAT SERPL-MCNC: 0.9 MG/DL (ref 0.7–1.2)
EOSINOPHILS ABSOLUTE: 0.19 E9/L (ref 0.05–0.5)
EOSINOPHILS RELATIVE PERCENT: 2.5 % (ref 0–6)
GFR AFRICAN AMERICAN: >60
GFR NON-AFRICAN AMERICAN: >60 ML/MIN/1.73
GLUCOSE BLD-MCNC: 148 MG/DL (ref 74–99)
HBA1C MFR BLD: 9.2 % (ref 4–5.6)
HCT VFR BLD CALC: 46.1 % (ref 37–54)
HDLC SERPL-MCNC: 75 MG/DL
HEMOGLOBIN: 15.4 G/DL (ref 12.5–16.5)
IMMATURE GRANULOCYTES #: 0.02 E9/L
IMMATURE GRANULOCYTES %: 0.3 % (ref 0–5)
LDL CHOLESTEROL CALCULATED: 75 MG/DL (ref 0–99)
LYMPHOCYTES ABSOLUTE: 1.92 E9/L (ref 1.5–4)
LYMPHOCYTES RELATIVE PERCENT: 25.3 % (ref 20–42)
MCH RBC QN AUTO: 29.8 PG (ref 26–35)
MCHC RBC AUTO-ENTMCNC: 33.4 % (ref 32–34.5)
MCV RBC AUTO: 89.2 FL (ref 80–99.9)
MONOCYTES ABSOLUTE: 0.51 E9/L (ref 0.1–0.95)
MONOCYTES RELATIVE PERCENT: 6.7 % (ref 2–12)
NEUTROPHILS ABSOLUTE: 4.85 E9/L (ref 1.8–7.3)
NEUTROPHILS RELATIVE PERCENT: 64 % (ref 43–80)
PDW BLD-RTO: 12 FL (ref 11.5–15)
PLATELET # BLD: 331 E9/L (ref 130–450)
PMV BLD AUTO: 11.2 FL (ref 7–12)
POTASSIUM SERPL-SCNC: 4.8 MMOL/L (ref 3.5–5)
PROSTATE SPECIFIC ANTIGEN: 0.39 NG/ML (ref 0–4)
RBC # BLD: 5.17 E12/L (ref 3.8–5.8)
SODIUM BLD-SCNC: 139 MMOL/L (ref 132–146)
TOTAL PROTEIN: 7.4 G/DL (ref 6.4–8.3)
TRIGL SERPL-MCNC: 57 MG/DL (ref 0–149)
TSH SERPL DL<=0.05 MIU/L-ACNC: 1.75 UIU/ML (ref 0.27–4.2)
VLDLC SERPL CALC-MCNC: 11 MG/DL
WBC # BLD: 7.6 E9/L (ref 4.5–11.5)

## 2020-08-06 PROCEDURE — 80061 LIPID PANEL: CPT

## 2020-08-06 PROCEDURE — 85025 COMPLETE CBC W/AUTO DIFF WBC: CPT

## 2020-08-06 PROCEDURE — 84443 ASSAY THYROID STIM HORMONE: CPT

## 2020-08-06 PROCEDURE — G0103 PSA SCREENING: HCPCS

## 2020-08-06 PROCEDURE — 80053 COMPREHEN METABOLIC PANEL: CPT

## 2020-08-06 PROCEDURE — 83036 HEMOGLOBIN GLYCOSYLATED A1C: CPT

## 2020-08-10 ENCOUNTER — HOSPITAL ENCOUNTER (OUTPATIENT)
Dept: OCCUPATIONAL THERAPY | Age: 65
Setting detail: THERAPIES SERIES
Discharge: HOME | End: 2020-08-10
Payer: MEDICARE

## 2020-08-10 PROCEDURE — 97022 WHIRLPOOL THERAPY: CPT | Performed by: OCCUPATIONAL THERAPIST

## 2020-08-10 PROCEDURE — 97110 THERAPEUTIC EXERCISES: CPT | Performed by: OCCUPATIONAL THERAPIST

## 2020-08-10 PROCEDURE — 97140 MANUAL THERAPY 1/> REGIONS: CPT | Performed by: OCCUPATIONAL THERAPIST

## 2020-08-10 NOTE — PROGRESS NOTES
[address]  [phone]    Date:  8/10/2020     Initial Evaluation Date: 8/3/20                                  Evaluating Therapist: Lela Comer     Patient Name:  Katelyn Bustos                    :  1955     Restrictions/Precautions:  none, low fall risk  Diagnosis:  Swelling R hand /CTR R                                                  Insurance/Certification information:  [employer] [place]  Referring Practitioner:  Dr. Mcwilliams Estimable  Referring Practitioner specific orders: eval and treat. Edema control, etc, rom  Date of Surgery/Injury: CTR 7/15/20  Plan of care signed (Y/N):  No  Visit# / total visits: 3 / 18Pain Level: moderate, aching    Subjective: \"Seeing a shoulder specialist because i'm not progressing and I feel a lot of this is coming from my shoulder. .\"     Objective:  Updated POC to be completed by 10th visit. INTERVENTION: COMPLETED: SPECIFICS/COMMENTS:   Modality:     fluido x 10 min to increase tissue mobiltiy        AROM:     Wrist all planes x Increase wrist rom   Bean grasp x 15 min   Tendon glides x 15x2        Finger flex/ext x    AAROM:               PROM/Stretching:     Wrist /fingers as erin x 10 min  Sign. Tenderness fingers/joint tightness. Scar Mass/Edema Control:     Soft tissue mobilitzation x 15 min to increase tissue mobiltiy ,. Increase scar mobility        Strengthening:     UBE x 10 min to increase circulation to help decrease swelling   clothespins x allcolors to increase pinch strength. Other:     kinesio taping x To reduce swelling and pain. Hep/rice grasp, tendon glides, towel scrunches x Encouraged to perform 5 x daily to improve finger mobiltiy and reduce swelling and stiffness. Contrast baths/ rice heating pad x 2 x daily for reduction of swelling. Improved mobility     Assessment/Comments: Pt is making Good progress toward stated plan of care. Sign stiffness cont's. Encouraged to perform HEP.  Improved active

## 2020-08-12 ENCOUNTER — HOSPITAL ENCOUNTER (OUTPATIENT)
Dept: OCCUPATIONAL THERAPY | Age: 65
Setting detail: THERAPIES SERIES
Discharge: HOME | End: 2020-08-12
Payer: MEDICARE

## 2020-08-12 PROCEDURE — 97110 THERAPEUTIC EXERCISES: CPT | Performed by: OCCUPATIONAL THERAPIST

## 2020-08-12 PROCEDURE — 97140 MANUAL THERAPY 1/> REGIONS: CPT | Performed by: OCCUPATIONAL THERAPIST

## 2020-08-12 PROCEDURE — 97022 WHIRLPOOL THERAPY: CPT | Performed by: OCCUPATIONAL THERAPIST

## 2020-08-12 NOTE — PROGRESS NOTES
[address]  [phone]    Date:  2020     Initial Evaluation Date: 8/3/20                                  Evaluating Therapist: Abdias Luna     Patient Name:  Vini Duncan                    :  1955     Restrictions/Precautions:  none, low fall risk  Diagnosis:  Swelling R hand /CTR R                                                  Insurance/Certification information:  [place] [employer]  Referring Practitioner:  Dr. Chilango Benavides  Referring Practitioner specific orders: eval and treat. Edema control, etc, rom  Date of Surgery/Injury: CTR 7/15/20  Plan of care signed (Y/N):  No  Visit# / total visits: Pain Level: moderate, aching    Subjective: \"Hand is actually moving better since last seen . \"     Objective:  Updated POC to be completed by 10th visit. INTERVENTION: COMPLETED: SPECIFICS/COMMENTS:   Modality:     fluido x 10 min to increase tissue mobiltiy        AROM:     Wrist all planes x Increase wrist rom   Bean grasp x 15 min   Tendon glides x 15x2        Finger flex/ext x    AAROM:               PROM/Stretching:     Wrist /fingers as erin x 10 min  Sign. Tenderness fingers/joint tightness. Scar Mass/Edema Control:     Soft tissue mobilitzation x 15 min to increase tissue mobiltiy ,. Increase scar mobility        Strengthening:     UBE x 10 min to increase circulation to help decrease swelling   clothespins x allcolors to increase pinch strength. Other:     kinesio taping x To reduce swelling and pain. Hep/rice grasp, tendon glides, towel scrunches x Encouraged to perform 5 x daily to improve finger mobiltiy and reduce swelling and stiffness. Contrast baths/ rice heating pad x 2 x daily for reduction of swelling. Improved mobility     Assessment/Comments: Pt is making Good progress toward stated plan of care. Some improved finger flexion and mobility noted today at beginning of session.  Increased home activity and home exercises reported. To progress as erin.     -Rehab Potential: Good    -Requires OT Follow Up: Yes  Time In:1            Time Out: 2             Treatment Charges: Mins Units   Modalities/fluido 10 1   Ther Exercise 30 2   Manual Therapy 15 1   Thera Activities     ADL/Home Mgt      Neuro Re-education     Gait Training     Group Therapy     Non-Billable Service Time     Other     Total Time/Units 55 4     Goals: Goals for pt can be seen on initial evaluation. Plan:   [x]  Continues Plan of care: Treatment covered based on POC and graduated to patient's progress. Pt education continues at each visit to obtain maximum benefits from skilled OT intervention.   []  Alter Plan of care:   []  Discharge:      Nayeli Huynh OT/L, [address]

## 2020-08-17 ENCOUNTER — HOSPITAL ENCOUNTER (OUTPATIENT)
Dept: OCCUPATIONAL THERAPY | Age: 65
Setting detail: THERAPIES SERIES
Discharge: HOME | End: 2020-08-17
Payer: MEDICARE

## 2020-08-17 PROCEDURE — 97110 THERAPEUTIC EXERCISES: CPT | Performed by: OCCUPATIONAL THERAPIST

## 2020-08-17 PROCEDURE — 97022 WHIRLPOOL THERAPY: CPT | Performed by: OCCUPATIONAL THERAPIST

## 2020-08-17 PROCEDURE — 97140 MANUAL THERAPY 1/> REGIONS: CPT | Performed by: OCCUPATIONAL THERAPIST

## 2020-08-17 NOTE — PROGRESS NOTES
for reduction of swelling. Improved mobility     Assessment/Comments: Pt is making Good progress toward stated plan of care. Pt. nearing 8 weeks post op began light strengthening today with good erni. To progress as erin. Pt. Encouraged to increase shoulder and hand home exercises. -Rehab Potential: Good    -Requires OT Follow Up: Yes  Time In:1            Time Out: 2             Treatment Charges: Mins Units   Modalities/fluido 10 1   Ther Exercise 30 2   Manual Therapy 15 1   Thera Activities     ADL/Home Mgt      Neuro Re-education     Gait Training     Group Therapy     Non-Billable Service Time     Other     Total Time/Units 55 4     Goals: Goals for pt can be seen on initial evaluation. Plan:   [x]  Continues Plan of care: Treatment covered based on POC and graduated to patient's progress. Pt education continues at each visit to obtain maximum benefits from skilled OT intervention.   []  Alter Plan of care:   []  Discharge:      Marai D Packer OT/JOEL, [address]

## 2020-08-19 ENCOUNTER — HOSPITAL ENCOUNTER (OUTPATIENT)
Dept: OCCUPATIONAL THERAPY | Age: 65
Setting detail: THERAPIES SERIES
Discharge: HOME | End: 2020-08-19
Payer: MEDICARE

## 2020-08-19 PROCEDURE — 97110 THERAPEUTIC EXERCISES: CPT | Performed by: OCCUPATIONAL THERAPIST

## 2020-08-19 PROCEDURE — 97022 WHIRLPOOL THERAPY: CPT | Performed by: OCCUPATIONAL THERAPIST

## 2020-08-19 PROCEDURE — 97140 MANUAL THERAPY 1/> REGIONS: CPT | Performed by: OCCUPATIONAL THERAPIST

## 2020-08-19 NOTE — PROGRESS NOTES
[address]  [phone]    Date:  2020     Initial Evaluation Date: 8/3/20                                  Evaluating Therapist: Nata Oneal     Patient Name:  Genesis Rowe                    :  1955     Restrictions/Precautions:  none, low fall risk  Diagnosis:  Swelling R hand /CTR R                                                  Insurance/Certification information:  Kishor Borja  Referring Practitioner:  Dr. Isabel Huerta  Referring Practitioner specific orders: eval and treat. Edema control, etc, rom  Date of Surgery/Injury: CTR 7/15/20  Plan of care signed (Y/N):  No  Visit# / total visits:  Pain Level: moderate, aching    Subjective: \"I saw my doctor and he thinks I need to see an arthritis doctor. He released me from his care. Doctor gave me a cortizone pack to see if it will help with swelling and arom. \"     Objective:  Updated POC to be completed by 10th visit. INTERVENTION: COMPLETED: SPECIFICS/COMMENTS:   Modality:     fluido x 10 min to increase tissue mobiltiy        AROM:     Wrist all planes x Increase wrist rom   Bean grasp x 15 min   Tendon glides x 15x2   Shoulder int/ext rotation stretches x 15x2, for nerve gliding and tightness rotator cuff. Reduce overall pain shoulder/hand   Finger flex/ext x    AAROM:               PROM/Stretching:     Wrist /fingers as erin x 10 min  Sign. Tenderness fingers/joint tightness. Scar Mass/Edema Control:     Soft tissue mobilitzation x 15 min to increase tissue mobiltiy ,. Increase scar mobility        Strengthening:               Light Putty roll and pegs x 10 min /improving  strength. UBE x 10 min to increase circulation to help decrease swelling   clothespins x allcolors to increase pinch strength. Other:     kinesio taping x To reduce swelling and pain.     Flexion glove /hep x To increase active finger flexion   Hep/rice grasp, tendon glides, towel scrunches x Encouraged to perform 5 x daily to improve finger mobiltiy and reduce swelling and stiffness. Contrast baths/ rice heating pad x 2 x daily for reduction of swelling. Improved mobility     Assessment/Comments: Pt is making Good progress toward stated plan of care. Some improved finger mobility since last seen as well as some less swelling. Progressing strengthening as erin.     -Rehab Potential: Good    -Requires OT Follow Up: Yes  Time In:12            Time Out: 1            Treatment Charges: Mins Units   Modalities/fluido 10 1   Ther Exercise 30 2   Manual Therapy 15 1   Thera Activities     ADL/Home Mgt      Neuro Re-education     Gait Training     Group Therapy     Non-Billable Service Time     Other     Total Time/Units 55 4     Goals: Goals for pt can be seen on initial evaluation. Plan:   [x]  Continues Plan of care: Treatment covered based on POC and graduated to patient's progress. Pt education continues at each visit to obtain maximum benefits from skilled OT intervention.   []  Alter Plan of care:   []  Discharge:      Efrain Mike OT/L, [address]

## 2020-08-25 ENCOUNTER — HOSPITAL ENCOUNTER (OUTPATIENT)
Dept: OCCUPATIONAL THERAPY | Age: 65
Setting detail: THERAPIES SERIES
Discharge: HOME | End: 2020-08-25
Payer: MEDICARE

## 2020-08-25 PROCEDURE — 97110 THERAPEUTIC EXERCISES: CPT | Performed by: OCCUPATIONAL THERAPIST

## 2020-08-25 PROCEDURE — 97140 MANUAL THERAPY 1/> REGIONS: CPT | Performed by: OCCUPATIONAL THERAPIST

## 2020-08-25 PROCEDURE — 97022 WHIRLPOOL THERAPY: CPT | Performed by: OCCUPATIONAL THERAPIST

## 2020-08-25 NOTE — PROGRESS NOTES
[address]  [phone]    Date:  2020     Initial Evaluation Date: 8/3/20                                  Evaluating Therapist: Josselyn Carrington     Patient Name:  Zackery Thompson                    :  1955     Restrictions/Precautions:  none, low fall risk  Diagnosis:  Swelling R hand /CTR R                                                  Insurance/Certification information:  Justin Cancino  Referring Practitioner:  Dr. Garald Cheadle  Referring Practitioner specific orders: eval and treat. Edema control, etc, rom  Date of Surgery/Injury: CTR 7/15/20  Plan of care signed (Y/N):  No  Visit# / total visits:  Pain Level: moderate, aching    Subjective: \"today is the last day of my steroids. I helped some with less swelling and some decreased pain. \"     Objective:  Updated POC to be completed by 10th visit. INTERVENTION: COMPLETED: SPECIFICS/COMMENTS:   Modality:     fluido x 10 min to increase tissue mobiltiy        AROM:     Wrist all planes x Increase wrist rom   Bean grasp x 15 min   Tendon glides x 15x2   Shoulder int/ext rotation stretches x 15x2, for nerve gliding and tightness rotator cuff. Reduce overall pain shoulder/hand   Finger flex/ext x    AAROM:               PROM/Stretching:     Wrist /fingers as erni x 10 min  Sign. Tenderness fingers/joint tightness. Scar Mass/Edema Control:     Soft tissue mobilitzation x 15 min to increase tissue mobiltiy ,. Increase scar mobility        Strengthenin# wrist ext/flex x 15x2 increase wrist strength. Red flexbar x 15x2   Light Putty roll and pegs x 10 min /improving  strength. UBE x 10 min to increase circulation to help decrease swelling   clothespins x allcolors to increase pinch strength. Other:     kinesio taping  To reduce swelling and pain. Flexion glove /hep x To increase active finger flexion   Dry needling x To calm central nervous system/ increase active motion. Hep/rice grasp, tendon glides, towel scrunches x Encouraged to perform 5 x daily to improve finger mobiltiy and reduce swelling and stiffness. Contrast baths/ rice heating pad  2 x daily for reduction of swelling. Improved mobility     Assessment/Comments: Pt is making Good progress toward stated plan of care. Some improvement with active  to 3/4 range. Less swelling noted. Progress is slow but cont's  -Rehab Potential: Good    -Requires OT Follow Up: Yes  Time In:12            Time Out: 1            Treatment Charges: Mins Units   Modalities/fluido 10 1   Ther Exercise 30 2   Manual Therapy 15 1   Thera Activities     ADL/Home Mgt      Neuro Re-education     Gait Training     Group Therapy     Non-Billable Service Time     Other     Total Time/Units 55 4     Goals: Goals for pt can be seen on initial evaluation. Plan:   [x]  Continues Plan of care: Treatment covered based on POC and graduated to patient's progress. Pt education continues at each visit to obtain maximum benefits from skilled OT intervention.   []  Alter Plan of care:   []  Discharge:      Cachorro Santiago OT/L, [address]

## 2020-08-27 ENCOUNTER — HOSPITAL ENCOUNTER (OUTPATIENT)
Dept: OCCUPATIONAL THERAPY | Age: 65
Setting detail: THERAPIES SERIES
Discharge: HOME | End: 2020-08-27
Payer: MEDICARE

## 2020-08-27 PROCEDURE — 97022 WHIRLPOOL THERAPY: CPT | Performed by: OCCUPATIONAL THERAPIST

## 2020-08-27 PROCEDURE — 97110 THERAPEUTIC EXERCISES: CPT | Performed by: OCCUPATIONAL THERAPIST

## 2020-08-27 PROCEDURE — 97140 MANUAL THERAPY 1/> REGIONS: CPT | Performed by: OCCUPATIONAL THERAPIST

## 2020-08-27 NOTE — PROGRESS NOTES
[address]  [phone]    Date:  2020     Initial Evaluation Date: 8/3/20                                  Evaluating Therapist: Maria D Packer     Patient Name:  Iesha Allred                    :  1955     Restrictions/Precautions:  none, low fall risk  Diagnosis:  Swelling R hand /CTR R                                                  Insurance/Certification information:  Cy Duggan  Referring Practitioner:  Dr. Mario Bay  Referring Practitioner specific orders: eval and treat. Edema control, etc, rom  Date of Surgery/Injury: CTR 7/15/20  Plan of care signed (Y/N):  No  Visit# / total visits:  Pain Level: moderate, aching    Subjective: \"I'm really pleased that I can bend my fingers down more. Finally some progress. \"     Objective:  Updated POC to be completed by 10th visit. INTERVENTION: COMPLETED: SPECIFICS/COMMENTS:   Modality:     fluido x 10 min to increase tissue mobiltiy        AROM:     Wrist all planes x Increase wrist rom   Bean grasp x 15 min   Tendon glides x 15x2   Shoulder int/ext rotation stretches x 15x2, for nerve gliding and tightness rotator cuff. Reduce overall pain shoulder/hand   Finger flex/ext x    AAROM:               PROM/Stretching:     Wrist /fingers as erin x 10 min  Sign. Tenderness fingers/joint tightness. Scar Mass/Edema Control:     Soft tissue mobilitzation x 15 min to increase tissue mobiltiy ,. Increase scar mobility        Strengthening:     3# wrist ext/flex x 15x2 increase wrist strength. digiflex red x 3 min, finger mobility and stengthening   Red flexbar x 15x2   Light Putty roll and pegs x 10 min /improving  strength. UBE x 10 min to increase circulation to help decrease swelling   clothespins x allcolors to increase pinch strength. Other:     kinesio taping  To reduce swelling and pain.     Flexion glove /hep x To increase active finger flexion   Dry needling x To calm central nervous system/ increase active motion. Hep/rice grasp, tendon glides, towel scrunches x Encouraged to perform 5 x daily to improve finger mobiltiy and reduce swelling and stiffness. Contrast baths/ rice heating pad  2 x daily for reduction of swelling. Improved mobility     Assessment/Comments: Pt is making Good progress toward stated plan of care. improvement cont's with active  to 3/4 range. Less swelling noted. Progress is slow but cont's  -Rehab Potential: Good    -Requires OT Follow Up: Yes  Time In:12            Time Out: 1            Treatment Charges: Mins Units   Modalities/fluido 10 1   Ther Exercise 30 2   Manual Therapy 15 1   Thera Activities     ADL/Home Mgt      Neuro Re-education     Gait Training     Group Therapy     Non-Billable Service Time     Other     Total Time/Units 55 4     Goals: Goals for pt can be seen on initial evaluation. Plan:   [x]  Continues Plan of care: Treatment covered based on POC and graduated to patient's progress. Pt education continues at each visit to obtain maximum benefits from skilled OT intervention.   []  Alter Plan of care:   []  Discharge:      Ambrosio Gomez OT/L, [address]

## 2020-09-08 ENCOUNTER — HOSPITAL ENCOUNTER (OUTPATIENT)
Dept: OCCUPATIONAL THERAPY | Age: 65
Setting detail: THERAPIES SERIES
Discharge: HOME | End: 2020-09-08
Payer: MEDICARE

## 2020-09-08 PROCEDURE — 97022 WHIRLPOOL THERAPY: CPT | Performed by: OCCUPATIONAL THERAPIST

## 2020-09-08 PROCEDURE — 97110 THERAPEUTIC EXERCISES: CPT | Performed by: OCCUPATIONAL THERAPIST

## 2020-09-08 PROCEDURE — 97032 APPL MODALITY 1+ESTIM EA 15: CPT | Performed by: OCCUPATIONAL THERAPIST

## 2020-09-08 PROCEDURE — 97140 MANUAL THERAPY 1/> REGIONS: CPT | Performed by: OCCUPATIONAL THERAPIST

## 2020-09-10 ENCOUNTER — HOSPITAL ENCOUNTER (OUTPATIENT)
Dept: OCCUPATIONAL THERAPY | Age: 65
Setting detail: THERAPIES SERIES
Discharge: HOME | End: 2020-09-10
Payer: MEDICARE

## 2020-09-10 PROCEDURE — 97022 WHIRLPOOL THERAPY: CPT | Performed by: OCCUPATIONAL THERAPIST

## 2020-09-10 PROCEDURE — 97110 THERAPEUTIC EXERCISES: CPT | Performed by: OCCUPATIONAL THERAPIST

## 2020-09-10 PROCEDURE — 97140 MANUAL THERAPY 1/> REGIONS: CPT | Performed by: OCCUPATIONAL THERAPIST

## 2020-09-10 NOTE — PROGRESS NOTES
[address]  [phone]    Date:  9/10/2020     Initial Evaluation Date: 8/3/20                                  Evaluating Therapist: Abdias Luna     Patient Name:  Vini Duncan                    :  1955     Restrictions/Precautions:  none, low fall risk  Diagnosis:  Swelling R hand /CTR R                                                  Insurance/Certification information:  Phoenix Reyesh  Referring Practitioner:  Dr. Chilango Benavides  Referring Practitioner specific orders: eval and treat. Edema control, etc, rom  Date of Surgery/Injury: CTR 7/15/20  Plan of care signed (Y/N):  No  Visit# / total visits:  Pain Level: moderate, aching    Subjective: \"I'm seeing the shoulder specialist tomorrow. I really think I have a nerve problem in my shoulder. Hand just stiffens up quick after stretches. \"     Objective:  Updated POC to be completed by 10th visit. INTERVENTION: COMPLETED: SPECIFICS/COMMENTS:   Modality:     fluido x 10 min to increase tissue mobiltiy        AROM:     Wrist all planes x Increase wrist rom   Bean grasp x 15 min   Tendon glides x 15x2   Shoulder int/ext rotation stretches  15x2, for nerve gliding and tightness rotator cuff. Reduce overall pain shoulder/hand   Finger flex/ext x    AAROM:               PROM/Stretching:     Wrist /fingers as erin x 10 min  Sign. Tenderness fingers/joint tightness. Scar Mass/Edema Control:     Soft tissue mobilitzation x 15 min to increase tissue mobiltiy ,. Increase scar mobility        Strengthening:     3# wrist ext/flex x 15x2 increase wrist strength. digiflex red x 3 min, finger mobility and stengthening   Red flexbar x 15x2   Light Putty roll and pegs x 10 min /improving  strength. UBE x 10 min to increase circulation to help decrease swelling   clothespins x allcolors to increase pinch strength. Other:     fes x For finger flexion   kinesio taping  To reduce swelling and pain.     Flexion glove /hep x To increase active finger flexion   Dry needling  To calm central nervous system/ increase active motion. Hep/rice grasp, tendon glides, towel scrunches x Encouraged to perform 5 x daily to improve finger mobiltiy and reduce swelling and stiffness. Contrast baths/ rice heating pad  2 x daily for reduction of swelling. Improved mobility     Assessment/Comments: Pt is making Good progress toward stated plan of care. Good response from fes with improved finger flexion. Possible nerve shoulder issue is suspected. .Will progress as erin.  -Rehab Potential: Good    -Requires OT Follow Up: Yes  Time In:1            Time Out: 2            Treatment Charges: Mins Units   Modalities/fluido 10 1   Ther Exercise 30 2   Manual Therapy 15 1   Thera Activities     ADL/Home Mgt      Neuro Re-education     Gait Training     Group Therapy     Non-Billable Service Time     Other     Total Time/Units 55 4     Goals: Goals for pt can be seen on initial evaluation. Plan:   [x]  Continues Plan of care: Treatment covered based on POC and graduated to patient's progress. Pt education continues at each visit to obtain maximum benefits from skilled OT intervention.   []  Alter Plan of care:   []  Discharge:      Cachorro Santiago OT/L, [address]

## 2020-09-15 ENCOUNTER — HOSPITAL ENCOUNTER (OUTPATIENT)
Dept: OCCUPATIONAL THERAPY | Age: 65
Setting detail: THERAPIES SERIES
Discharge: HOME | End: 2020-09-15
Payer: MEDICARE

## 2020-09-15 PROCEDURE — 97018 PARAFFIN BATH THERAPY: CPT

## 2020-09-15 PROCEDURE — 97110 THERAPEUTIC EXERCISES: CPT

## 2020-09-15 PROCEDURE — 97140 MANUAL THERAPY 1/> REGIONS: CPT

## 2020-09-15 NOTE — PROGRESS NOTES
[address]  [phone]    Date:  9/15/2020     Initial Evaluation Date: 8/3/20                                  Evaluating Therapist: Dimitry Wills     Patient Name:  [facility])                   :  1955     Restrictions/Precautions:  none, low fall risk  Diagnosis:  Swelling R hand /CTR R                                                  Insurance/Certification information:  Sandor Boyer, Medicare  Referring Practitioner:  Dr. Charisse Roland  Referring Practitioner specific orders: eval and treat. Edema control, etc, rom  Date of Surgery/Injury: CTR 7/15/20  Plan of care signed (Y/N):  No  Visit# / total visits:  Pain Level: moderate, aching    Subjective: \"Dr. Brandon Barfield said my hand might get a little better but it won't get much better in the future. He said the diabetes contributes to a lot of this. \" Patient brought in new script from new physician. Objective:  Updated POC to be completed by 10th visit. INTERVENTION: COMPLETED: SPECIFICS/COMMENTS:   Modality:     fluido  *Affected UE: Completes x15 min to promote tissue healing, skin desensitization, reduce inflammation, and decrease pain. Actively completed SROM in all available planes with holds at end range during treatment       Paraffin x To wrist digits    AROM:     Wrist all planes x Increase wrist rom   Pendulums x 2# wt swing all directions  Added to HEP   Mac grasp  15 min   Tendon glides x 15x2   Shoulder int/ext rotation stretches  15x2, for nerve gliding and tightness rotator cuff. Reduce overall pain shoulder/hand   Finger flex/ext x    AAROM:     Shoulder flexion/ex x Wall pulley all directions  Added to HEP        PROM/Stretching:     Wrist /fingers as erin x With putty assist into flexion        Scar Mass/Edema Control:     Soft tissue mobilitzation x 15 min to increase tissue mobiltiy ,.  Increase scar mobility        Strengthening:     3# wrist ext/flex  15x2 increase wrist strength. digiflex red  3 min, finger mobility and stengthening   Red flexbar  15x2   Light Putty roll and pegs x 10 min /improving  strength. UBE x 10 min to increase circulation to help decrease swelling   clothespins  allcolors to increase pinch strength. Other:     fes  For finger flexion   kinesio taping  To reduce swelling and pain. Flexion glove /hep x Ongoing To increase active finger flexion   Dry needling  To calm central nervous system/ increase active motion. Hep/rice grasp, tendon glides, towel scrunches x Encouraged to perform 5 x daily to improve finger mobiltiy and reduce swelling and stiffness. Contrast baths/ rice heating pad  2 x daily for reduction of swelling. Improved mobility     Assessment/Comments: Pt is making Good progress toward stated plan of care. Good response from fes with improved finger flexion. Possible nerve shoulder issue is suspected. .Will progress as erin.  -Rehab Potential: Good    -Requires OT Follow Up: Yes  Time In:1            Time Out: 2            Treatment Charges: Mins Units   Modalities/paraffin 10 1   Ther Exercise 30 2   Manual Therapy 15 1   Thera Activities     ADL/Home Mgt      Neuro Re-education     Gait Training     Group Therapy     Non-Billable Service Time     Other     Total Time/Units 55 4     Goals:   1) Patient will demonstrate good understanding of home program (exercises/activities/diagnosis/prognosis/goals) with good accuracy. 2) Patient will demonstrate increased active/passive range of motion of their affected extremity/hand to [facility] for ADL/IADL completion. 3) Patient will demonstrate increased /pinch strength of at least 10 / 5 pinch pounds of their affected hand. Will measure next week  4) Patient to report decreased pain in their affected hand/upper extremity from 2-5/10 to 0-2/10 or less with resistive functional use.    5) Increase in fine motor function as evidenced by decreased time to complete 9-hole peg test and/or MRMT test by at least 30 seconds. 6) Patient will be knowledgeable of edema control techniques as evident with decreases from mod to none. 7) Patient will report ADL functions same as prior to diagnosis of swelling R hand, CTR  8) Patient will demonstrate improved functional activity tolerance from assist/mod ind to ind for ADL/IADL completion. 9) Patient will decrease QuickDASH score by 40% for increased participation in daily functional activities.        Plan:   [x]  Continues Plan of care: Treatment covered based on POC and graduated to patient's progress. Pt education continues at each visit to obtain maximum benefits from skilled OT intervention. []  Alter Plan of care:   []  Discharge:      JUAREZ Curry/L [id number]    COVID-19 Screening completed upon entering facility and patient cleared for treatment today. Pt followed all protocols set forth by [facility] for Outpatient services. Patient has been made aware of all new hygiene protocols due to COVID-19 set forth by [facility] Outpatient services and agrees to abide by all protocols.

## 2020-09-17 ENCOUNTER — APPOINTMENT (OUTPATIENT)
Dept: OCCUPATIONAL THERAPY | Age: 65
End: 2020-09-17
Payer: MEDICARE

## 2020-09-17 ENCOUNTER — HOSPITAL ENCOUNTER (OUTPATIENT)
Dept: OCCUPATIONAL THERAPY | Age: 65
Setting detail: THERAPIES SERIES
Discharge: HOME | End: 2020-09-17
Payer: MEDICARE

## 2020-09-17 PROCEDURE — 97110 THERAPEUTIC EXERCISES: CPT | Performed by: OCCUPATIONAL THERAPIST

## 2020-09-17 PROCEDURE — 97022 WHIRLPOOL THERAPY: CPT | Performed by: OCCUPATIONAL THERAPIST

## 2020-09-17 PROCEDURE — 97140 MANUAL THERAPY 1/> REGIONS: CPT | Performed by: OCCUPATIONAL THERAPIST

## 2020-09-17 NOTE — PROGRESS NOTES
[facility]  [phone]    Date:  2020     Initial Evaluation Date: 8/3/20                                  Evaluating Therapist: Johann Williamson     Patient Name:  Ciro [facility])                   :  1955     Restrictions/Precautions:  none, low fall risk  Diagnosis:  Swelling R hand /CTR R                                                  Insurance/Certification information:  [address] Medicare  Referring Practitioner:  Dr. Yvon Law  Referring Practitioner specific orders: eval and treat. Edema control, etc, rom  Date of Surgery/Injury: CTR 7/15/20  Plan of care signed (Y/N):  No  Visit# / total visits:  Pain Level: moderate, aching    Subjective: \"I need to have pt. For my shoulder. My hand and shoulder are needs. \"     Objective:  Updated POC to be completed by 10th visit. INTERVENTION: COMPLETED: SPECIFICS/COMMENTS:   Modality:     fluido  *Affected UE: Completes x15 min to promote tissue healing, skin desensitization, reduce inflammation, and decrease pain. Actively completed SROM in all available planes with holds at end range during treatment       Paraffin x To wrist digits    AROM:     Wrist all planes x Increase wrist rom   Pendulums x 2# wt swing all directions  Added to HEP   Mac grasp  15 min   Tendon glides x 15x2   Shoulder int/ext rotation stretches  15x2, for nerve gliding and tightness rotator cuff. Reduce overall pain shoulder/hand   Finger flex/ext x    AAROM:     Shoulder flexion/ex x Wall pulley all directions  Added to HEP        PROM/Stretching:     Wrist /fingers as erin x With putty assist into flexion        Scar Mass/Edema Control:     Soft tissue mobilitzation x 15 min to increase tissue mobiltiy ,. Increase scar mobility        Strengthening:     3# wrist ext/flex  15x2 increase wrist strength.     digiflex red  3 min, finger mobility and stengthening   Red flexbar  15x2   Light Putty roll and pegs x 10 min /improving  strength. UBE x 10 min to increase circulation to help decrease swelling   clothespins  allcolors to increase pinch strength. Other:     fes  For finger flexion   kinesio taping  To reduce swelling and pain. Flexion glove /hep x Ongoing To increase active finger flexion   Dry needling  To calm central nervous system/ increase active motion. Hep/rice grasp, tendon glides, towel scrunches x Encouraged to perform 5 x daily to improve finger mobiltiy and reduce swelling and stiffness. Contrast baths/ rice heating pad  2 x daily for reduction of swelling. Improved mobility     REAssessment/Comments: Pt is making Good progress toward stated plan of care. Sign stiffness noted today beginning of session. Good response with 3/4 active  at the end of session. Pt. Encouraged to wear stretching glove more often and up to 30-40 min. 9/17/20  Pain level 2- 4-5  Active wrist ext/flex: 58/45  Active opposition increased to P4 level  Active  increased to 1/2 range/3/4 ext cont's   Edema Description/Circumferential Measurements: Mod swelling  Dynamometer (setting 2):                              Left: 82#                                               Right: 15#                     Pinch Meter:              Lateral: Left= 15# ,Right= 12#        Palmar 3 point: Left= 12#, Right= 12#  9 Hole Peg Test:              Left: 20 sec              Right: 39 sec     QuickDASH Score: (Scale 100% full disability, 0 no disability):  increased from initially 75 percent to 60%                        GOALS (Long term same as Short term):  1) Patient will demonstrate good understanding of home program (exercises/activities/diagnosis/prognosis/goals) with good accuracy.  Goal progressing  2) Patient will demonstrate increased active/passive range of motion of their affected extremity/hand to [facility] for ADL/IADL completion./goal progressing  3) Patient will demonstrate increased /pinch strength of at least 10 / 5 pinch pounds of their affected hand. Slow progress  4) Patient to report decreased pain in their affected hand/upper extremity from 2-5/10 to 0-2/10 or less with resistive functional use. Idelia Pill progress  5) Increase in fine motor function as evidenced by decreased time to complete 9-hole peg test and/or MRMT test by at least 30 seconds. Goal progressing  6) Patient will be knowledgeable of edema control techniques as evident with decreases from mod to none. No change   7) Patient will report ADL functions same as prior to diagnosis of swelling R hand, CTR/ goal progressing  8) Patient will demonstrate improved functional activity tolerance from assist/mod ind to ind for ADL/IADL completion. Goal progressing  9) Patient will decrease QuickDASH score by 40% for increased participation in daily functional activities. Goal progressing          -Rehab Potential: Good    -Requires OT Follow Up: Yes  Time In:1            Time Out: 2            Treatment Charges: Mins Units   Modalities/fluido 10 1   Ther Exercise 30 2   Manual Therapy 15 1   Thera Activities     ADL/Home Mgt      Neuro Re-education     Gait Training     Group Therapy     Non-Billable Service Time     Other     Total Time/Units 55 4          Plan:   [x]  Continues Plan of care: Treatment covered based on POC and graduated to patient's progress. Pt education continues at each visit to obtain maximum benefits from skilled OT intervention. []  Alter Plan of care:   []  Discharge:      Prabha Rice OT/L, [address] [place] [address] [id number]    COVID-19 Screening completed upon entering facility and patient cleared for treatment today. Pt followed all protocols set forth by [address] [place] [address],[address] [facility] for Outpatient services. Patient has been made aware of all new hygiene protocols due to COVID-19 set forth by [address] [place] [address] Outpatient services and agrees to abide by all protocols.

## 2020-09-21 ENCOUNTER — HOSPITAL ENCOUNTER (OUTPATIENT)
Dept: OCCUPATIONAL THERAPY | Age: 65
Setting detail: THERAPIES SERIES
Discharge: HOME | End: 2020-09-21
Payer: MEDICARE

## 2020-09-21 PROCEDURE — 97110 THERAPEUTIC EXERCISES: CPT | Performed by: OCCUPATIONAL THERAPIST

## 2020-09-21 PROCEDURE — 97022 WHIRLPOOL THERAPY: CPT | Performed by: OCCUPATIONAL THERAPIST

## 2020-09-21 PROCEDURE — 97140 MANUAL THERAPY 1/> REGIONS: CPT | Performed by: OCCUPATIONAL THERAPIST

## 2020-09-21 NOTE — PROGRESS NOTES
[address]  [phone]    Date:  2020     Initial Evaluation Date: 8/3/20                                  Evaluating Therapist: Abdias Luna     Patient Name:  Vini Duncan [facility])                   :  1955     Restrictions/Precautions:  none, low fall risk  Diagnosis:  Swelling R hand /CTR R                                                  Insurance/Certification information:  [employer] [address], Medicare  Referring Practitioner:  Dr. Chilango Benavides  Referring Practitioner specific orders: eval and treat. Edema control, etc, rom  Date of Surgery/Injury: CTR 7/15/20  Plan of care signed (Y/N):  No  Visit# / total visits:  Pain Level: moderate, aching    Subjective: \"My hand was better over the weekend , but stiff today\"     Objective:  Updated POC to be completed by 10th visit. INTERVENTION: COMPLETED: SPECIFICS/COMMENTS:   Modality:     fluido  *Affected UE: Completes x15 min to promote tissue healing, skin desensitization, reduce inflammation, and decrease pain. Actively completed SROM in all available planes with holds at end range during treatment       Paraffin x To wrist digits    AROM:     Wrist all planes x Increase wrist rom   Pendulums x 2# wt swing all directions  Added to HEP   Mac grasp  15 min   Tendon glides x 15x2   Shoulder int/ext rotation stretches  15x2, for nerve gliding and tightness rotator cuff. Reduce overall pain shoulder/hand   Finger flex/ext x    AAROM:     Shoulder flexion/ex x Wall pulley all directions  Added to HEP        PROM/Stretching:     Wrist /fingers as erin x With putty assist into flexion        Scar Mass/Edema Control:     Soft tissue mobilitzation x 15 min to increase tissue mobiltiy ,. Increase scar mobility        Strengthening:     3# wrist ext/flex x 15x2 increase wrist strength. digiflex red x 3 min, finger mobility and stengthening   Red flexbar  15x2   Light Putty roll and pegs x 10 min /improving  strength. UBE x 10 min to increase circulation to help decrease swelling   clothespins x allcolors to increase pinch strength. Other:     fes  For finger flexion   kinesio taping x To reduce swelling and pain. Flexion glove /hep x Ongoing To increase active finger flexion   Dry needling  To calm central nervous system/ increase active motion. Hep/rice grasp, tendon glides, towel scrunches x Encouraged to perform 5 x daily to improve finger mobiltiy and reduce swelling and stiffness. Contrast baths/ rice heating pad  2 x daily for reduction of swelling. Improved mobility     REAssessment/Comments: Pt is making Good progress toward stated plan of care. Stiffness finger beginning of session except index finger less overall stiffness. 9/17/20  Pain level 2- 4-5  Active wrist ext/flex: 58/45  Active opposition increased to P4 level  Active  increased to 1/2 range/3/4 ext cont's   Edema Description/Circumferential Measurements: Mod swelling  Dynamometer (setting 2):                              Left: 82#                                               Right: 15#                     Pinch Meter:              Lateral: Left= 15# ,Right= 12#        Palmar 3 point: Left= 12#, Right= 12#  9 Hole Peg Test:              Left: 20 sec              Right: 39 sec     QuickDASH Score: (Scale 100% full disability, 0 no disability):  increased from initially 75 percent to 60%                        GOALS (Long term same as Short term):  1) Patient will demonstrate good understanding of home program (exercises/activities/diagnosis/prognosis/goals) with good accuracy. Goal progressing  2) Patient will demonstrate increased active/passive range of motion of their affected extremity/hand to [facility] for ADL/IADL completion./goal progressing  3) Patient will demonstrate increased /pinch strength of at least 10 / 5 pinch pounds of their affected hand.  Slow progress  4) Patient to report decreased pain in their affected hand/upper extremity from 2-5/10 to 0-2/10 or less with resistive functional use. Pamela Ahr progress  5) Increase in fine motor function as evidenced by decreased time to complete 9-hole peg test and/or MRMT test by at least 30 seconds. Goal progressing  6) Patient will be knowledgeable of edema control techniques as evident with decreases from mod to none. No change   7) Patient will report ADL functions same as prior to diagnosis of swelling R hand, CTR/ goal progressing  8) Patient will demonstrate improved functional activity tolerance from assist/mod ind to ind for ADL/IADL completion. Goal progressing  9) Patient will decrease QuickDASH score by 40% for increased participation in daily functional activities. Goal progressing          -Rehab Potential: Good    -Requires OT Follow Up: Yes  Time In:1            Time Out: 2            Treatment Charges: Mins Units   Modalities/fluido 10 1   Ther Exercise 30 2   Manual Therapy 15 1   Thera Activities     ADL/Home Mgt      Neuro Re-education     Gait Training     Group Therapy     Non-Billable Service Time     Other     Total Time/Units 55 4          Plan:   [x]  Continues Plan of care: Treatment covered based on POC and graduated to patient's progress. Pt education continues at each visit to obtain maximum benefits from skilled OT intervention. []  Alter Plan of care:   []  Discharge:      Cachorro Santiago OT/L, [place] [id number]    COVID-19 Screening completed upon entering facility and patient cleared for treatment today. Pt followed all protocols set forth by [facility] for Outpatient services. Patient has been made aware of all new hygiene protocols due to COVID-19 set forth by [facility] Outpatient services and agrees to abide by all protocols.

## 2020-09-24 ENCOUNTER — HOSPITAL ENCOUNTER (OUTPATIENT)
Dept: PHYSICAL THERAPY | Age: 65
Setting detail: THERAPIES SERIES
Discharge: HOME | End: 2020-09-24
Payer: MEDICARE

## 2020-09-24 ENCOUNTER — HOSPITAL ENCOUNTER (OUTPATIENT)
Dept: OCCUPATIONAL THERAPY | Age: 65
Setting detail: THERAPIES SERIES
Discharge: HOME | End: 2020-09-24
Payer: MEDICARE

## 2020-09-24 PROCEDURE — 97022 WHIRLPOOL THERAPY: CPT | Performed by: OCCUPATIONAL THERAPIST

## 2020-09-24 PROCEDURE — 97110 THERAPEUTIC EXERCISES: CPT | Performed by: OCCUPATIONAL THERAPIST

## 2020-09-24 PROCEDURE — 97162 PT EVAL MOD COMPLEX 30 MIN: CPT

## 2020-09-24 PROCEDURE — 97140 MANUAL THERAPY 1/> REGIONS: CPT | Performed by: OCCUPATIONAL THERAPIST

## 2020-09-24 NOTE — PROGRESS NOTES
[address]  [phone]    Date:  2020     Initial Evaluation Date: 8/3/20                                  Evaluating Therapist: Alexandra Arora     Patient Name:  [facility])                   :  1955     Restrictions/Precautions:  none, low fall risk  Diagnosis:  Swelling R hand /CTR R                                                  Insurance/Certification information:  [employer] [facility], Medicare  Referring Practitioner:  Dr. Jamaal Licona  Referring Practitioner specific orders: eval and treat. Edema control, etc, rom  Date of Surgery/Injury: CTR 7/15/20  Plan of care signed (Y/N):  No  Visit# / total visits:  Pain Level: moderate, aching    Subjective: \"index and small fingers are coming, still having issues with middle and ring digits. Some overall progress noted. \"     Objective:  Updated POC to be completed by 10th visit. INTERVENTION: COMPLETED: SPECIFICS/COMMENTS:   Modality:     fluido  *Affected UE: Completes x15 min to promote tissue healing, skin desensitization, reduce inflammation, and decrease pain. Actively completed SROM in all available planes with holds at end range during treatment       Paraffin x To wrist digits    AROM:     Wrist all planes x Increase wrist rom   Pendulums x 2# wt swing all directions  Added to HEP   Mac grasp  15 min   Tendon glides x 15x2   Shoulder int/ext rotation stretches  15x2, for nerve gliding and tightness rotator cuff. Reduce overall pain shoulder/hand   Finger flex/ext x    AAROM:     Shoulder flexion/ex x Wall pulley all directions  Added to HEP        PROM/Stretching:     Wrist /fingers as erin x With putty assist into flexion        Scar Mass/Edema Control:     Soft tissue mobilitzation x 15 min to increase tissue mobiltiy ,. Increase scar mobility        Strengthening:     3# wrist ext/flex x 15x2 increase wrist strength.     digiflex red x 3 min, finger mobility and stengthening   Red flexbar  15x2 Light Putty roll and pegs x 10 min /improving  strength. UBE x 10 min to increase circulation to help decrease swelling   clothespins x allcolors to increase pinch strength. Other:     fes  For finger flexion   kinesio taping x To reduce swelling and pain. Flexion glove /hep x Ongoing To increase active finger flexion   Dry needling  To calm central nervous system/ increase active motion. Hep/rice grasp, tendon glides, towel scrunches x Encouraged to perform 5 x daily to improve finger mobiltiy and reduce swelling and stiffness. Contrast baths/ rice heating pad  2 x daily for reduction of swelling. Improved mobility     REAssessment/Comments: Pt is making Good progress toward stated plan of care. Good erin with strengthening. Some improved finger closure is noted. 9/17/20  Pain level 2- 4-5  Active wrist ext/flex: 58/45  Active opposition increased to P4 level  Active  increased to 1/2 range/3/4 ext cont's   Edema Description/Circumferential Measurements: Mod swelling  Dynamometer (setting 2):                              Left: 82#                                               Right: 15#                     Pinch Meter:              Lateral: Left= 15# ,Right= 12#        Palmar 3 point: Left= 12#, Right= 12#  9 Hole Peg Test:              Left: 20 sec              Right: 39 sec     QuickDASH Score: (Scale 100% full disability, 0 no disability):  increased from initially 75 percent to 60%                        GOALS (Long term same as Short term):  1) Patient will demonstrate good understanding of home program (exercises/activities/diagnosis/prognosis/goals) with good accuracy. Goal progressing  2) Patient will demonstrate increased active/passive range of motion of their affected extremity/hand to [facility] for ADL/IADL completion./goal progressing  3) Patient will demonstrate increased /pinch strength of at least 10 / 5 pinch pounds of their affected hand.  Slow

## 2020-09-24 NOTE — PROGRESS NOTES
[address] [facility]                Phone: [phone]   Fax: [phone]    Physical Therapy Initial Evaluation  Date:  2020    Patient Name:  Iesha Allred    :  1955  MRN: [id number]  Referring Physician:  Matt Du  Insurance Information:  Iliana Holt     Evaluation date:  20  Diagnosis:  R shoulder pain, adhesive capsulitis   PMH:  R shoulder rotator cuff repair on 20 by Dr. Stevan Maynard, R carpal tunnel release    Evaluating Physical Therapist:  Lucio Shaw DPT       Subjective:  History/Onset of Symptoms:  Pt reports that he has R rotator cuff repair done on 20 by Dr. Stevan Maynard. He states that everything went well initially but then pt began to have difficulty using R hand. He states that while doing PT after rotator cuff repair, his R hand began to get weak and swell and he had difficulty closing his fingers and making a fist.  Weakness persisted and pt reports that he was told by Dr. Adam Peralta that he was not a hand specialist and was not sure why he was having difficulty with his hand. He was referred to a hand specialist and eventually underwent carpal tunnel release on R wrist.  Pt reports that he continued to have weakness and swelling in R hand/ wrist after carpal tunnel release. Pt does report that he is a diabetic as well. Pt got a second opinion from Dr. Monique Casey in regards to his R shoulder. He denies neck pain at rest and states that he has never had any surgical interventions to neck. He does report a recent MRI of cervical spine this year because his primary care physician though that some of his symptoms may be coming from his cervical spine. Pt denies numbness and tingling to RUE throughout this entire process since his R rotator cuff repair. He is also currently seeing OT for R hand symptoms since carpal tunnel release.          Pain:  2/10  R posterior shoulder blade        Sensation:  Denies numbness and tingling to RUE     Previous methods of Treatment:  Physical therapy, occupational therapy for R hand      Additional Comments:  Pt worked as a  for [employer], he retired in June 2020      Objective:    R shoulder Exam     R shoulder     AROM    PROM     Flexion   160 °     170 ° (difficulty getting R humerus adducted next to head in flexion)    Abduction   160 °     170 °     IR    R posterior pocket   Midline L5     ER   C5     C7     Strength:  R hand 3-/5 (difficulty making fist)        Able to oppose digits but difficulty touching thumb to 5th digit        Key pinch  4/5         R elbow 4+/5         R shoulder 4/5 throughout            LUE 5/5 throughout with full AROM     R shoulder mechanics:  Pt with mild R shoulder shrug with resisted strength testing in flexion and abduction. Slightly decreased scapular upward rotation with overhead reaching. Cervical Exam    Min movement loss with R and L rotation, retraction, and extension   Pt with R sided cervical and mid/ upper trap pain with L and R cervical rotation and extension   Repeated movements:   Cervical retraction:  I, B  (decreased R cervical pain)   Improved shoulder flexion AROM on retest with ability to get humerus closer to head with shoulder flexed overhead    Retraction with extension:  I, B  Pt report decreased R scapular pain and cervical pain     Comments:  PT had long discussion with pt regarding current symptoms and history. Pt does have some cervical stiffness and pain located on R side. Showed decreased in pain and improved R shoulder ROM following repeated cervical spine movements. I think at this time we need to further rule out cervical pathology in regards to some of the pt's current symptoms due to him being 7 months post rotator cuff repair and still having RUE pain, decreased ROM, and symptoms into R hand that are slow to improve. He is in agreement with this POC.       HEP:  Repeated cervical retractions for at least 3x10 every 2-3 hours  Hardcopy of HEP provided  Pt verbalized and demonstrated understanding of HEP   Pt verbalized understanding to discontinue HEP if any red flag symptoms occu  Pt also educated on proper sitting posture and how it related to his shoulder movement and function         Summary/Assessment:    Pt presents to outpatient physical therapy with R shoulder pain, decreased ROM, R hand weakness, and R sided cervical pain. Plan:     Below checked are areas for improvement during physical therapy POC:        [x]  Pain reduction  []  Balance Improvement       [x]  Strengthening  [x]  Postural Improvement   [x]  Range of Motion  [x]  Soft Tissue Improvement    []  Gait Training   []  Other:      [x]  Home Exercise Program      Pt will be see for 2-3 visits per week for 6 weeks for a total of 12-18 visits to accomplish goals set below:        Short Term/ Long Term Goals: (6 weeks)      1. Pt will report 0/10 R side neck and RUE pain with daily activities     2. Pt will improve sitting/ standing posture from FAIR to GOOD    3. Pt will increase R shoulder AROM to [facility] in all directions     4. Pt will increase cervical spine AROM to [facility] in all directions with no movement loss     5. Pt will increase R shoulder strength to 5/5 throughout     6. Pt will be independent with HEP       Pt's potential for reaching Physical Therapy goals: good/ fair . Time In:  1000  Time Out:  [address] [place], [place]  [id number]    Basia Wright  T: [phone]   F: [phone]     If you have any questions or concerns, please don't hesitate to call. Thank you for your referral.    Physician Signature:________________________________Date:__________________  By signing above, therapists plan is approved by physician. All patients under [employer]   must be signed by physician.

## 2020-09-28 ENCOUNTER — HOSPITAL ENCOUNTER (OUTPATIENT)
Dept: OCCUPATIONAL THERAPY | Age: 65
Setting detail: THERAPIES SERIES
Discharge: HOME | End: 2020-09-28
Payer: MEDICARE

## 2020-09-28 ENCOUNTER — HOSPITAL ENCOUNTER (OUTPATIENT)
Dept: PHYSICAL THERAPY | Age: 65
Setting detail: THERAPIES SERIES
Discharge: HOME | End: 2020-09-28
Payer: MEDICARE

## 2020-09-28 PROCEDURE — 97140 MANUAL THERAPY 1/> REGIONS: CPT

## 2020-09-28 PROCEDURE — 97022 WHIRLPOOL THERAPY: CPT | Performed by: OCCUPATIONAL THERAPIST

## 2020-09-28 PROCEDURE — 97110 THERAPEUTIC EXERCISES: CPT | Performed by: OCCUPATIONAL THERAPIST

## 2020-09-28 PROCEDURE — 97110 THERAPEUTIC EXERCISES: CPT

## 2020-09-28 PROCEDURE — 97140 MANUAL THERAPY 1/> REGIONS: CPT | Performed by: OCCUPATIONAL THERAPIST

## 2020-09-28 NOTE — PROGRESS NOTES
[address]  [phone]    Date:  2020     Initial Evaluation Date: 8/3/20                                  Evaluating Therapist: Nata Oneal     Patient Name:  [facility])                   :  1955     Restrictions/Precautions:  none, low fall risk  Diagnosis:  Swelling R hand /CTR R                                                  Insurance/Certification information:  July Mendieta, Medicare  Referring Practitioner:  Dr. Isabel Huerta  Referring Practitioner specific orders: eval and treat. Edema control, etc, rom  Date of Surgery/Injury: CTR 7/15/20  Plan of care signed (Y/N):  No  Visit# / total visits: 15 / 18 Pain Level: moderate, aching    Subjective: \"Worked my hand a lot over the weekend fixing my car, so it feels somewhat sore. \"     Objective:  Updated POC to be completed by 10th visit. INTERVENTION: COMPLETED: SPECIFICS/COMMENTS:   Modality:     fluido  *Affected UE: Completes x15 min to promote tissue healing, skin desensitization, reduce inflammation, and decrease pain. Actively completed SROM in all available planes with holds at end range during treatment       Paraffin x To wrist digits    AROM:     Wrist all planes x Increase wrist rom   Pendulums x 2# wt swing all directions  Added to HEP   Mac grasp  15 min   Tendon glides x 15x2   Shoulder int/ext rotation stretches  15x2, for nerve gliding and tightness rotator cuff. Reduce overall pain shoulder/hand   Finger flex/ext x    AAROM:     Shoulder flexion/ex x Wall pulley all directions  Added to HEP        PROM/Stretching:     Wrist /fingers as erin x With putty assist into flexion        Scar Mass/Edema Control:     Soft tissue mobilitzation x 15 min to increase tissue mobiltiy ,. Increase scar mobility        Strengthening:     3# wrist ext/flex x 15x2 increase wrist strength.     digiflex red x 3 min, finger mobility and stengthening   Red flexbar  15x2   Light Putty roll and pegs x 10 min /improving  strength. UBE x 10 min to increase circulation to help decrease swelling   clothespins x allcolors to increase pinch strength. Other:     fes  For finger flexion   kinesio taping x To reduce swelling and pain. Flexion glove /hep x Ongoing To increase active finger flexion   Dry needling  To calm central nervous system/ increase active motion. Hep/rice grasp, tendon glides, towel scrunches x Encouraged to perform 5 x daily to improve finger mobiltiy and reduce swelling and stiffness. Contrast baths/ rice heating pad  2 x daily for reduction of swelling. Improved mobility     REAssessment/Comments: Pt is making Good progress toward stated plan of care. Good erin with strengthening. Strength is progressing. 9/17/20  Pain level 2- 4-5  Active wrist ext/flex: 58/45  Active opposition increased to P4 level  Active  increased to 1/2 range/3/4 ext cont's   Edema Description/Circumferential Measurements: Mod swelling  Dynamometer (setting 2):                              Left: 82#                                               Right: 15#                     Pinch Meter:              Lateral: Left= 15# ,Right= 12#        Palmar 3 point: Left= 12#, Right= 12#  9 Hole Peg Test:              Left: 20 sec              Right: 39 sec     QuickDASH Score: (Scale 100% full disability, 0 no disability):  increased from initially 75 percent to 60%                        GOALS (Long term same as Short term):  1) Patient will demonstrate good understanding of home program (exercises/activities/diagnosis/prognosis/goals) with good accuracy. Goal progressing  2) Patient will demonstrate increased active/passive range of motion of their affected extremity/hand to [facility] for ADL/IADL completion./goal progressing  3) Patient will demonstrate increased /pinch strength of at least 10 / 5 pinch pounds of their affected hand.  Slow progress  4) Patient to report decreased pain in their affected hand/upper extremity from 2-5/10 to 0-2/10 or less with resistive functional use. Thereynoldis Champagne progress  5) Increase in fine motor function as evidenced by decreased time to complete 9-hole peg test and/or MRMT test by at least 30 seconds. Goal progressing  6) Patient will be knowledgeable of edema control techniques as evident with decreases from mod to none. No change   7) Patient will report ADL functions same as prior to diagnosis of swelling R hand, CTR/ goal progressing  8) Patient will demonstrate improved functional activity tolerance from assist/mod ind to ind for ADL/IADL completion. Goal progressing  9) Patient will decrease QuickDASH score by 40% for increased participation in daily functional activities. Goal progressing          -Rehab Potential: Good    -Requires OT Follow Up: Yes  Time In:1            Time Out: 2            Treatment Charges: Mins Units   Modalities/fluido 10 1   Ther Exercise 30 2   Manual Therapy 15 1   Thera Activities     ADL/Home Mgt      Neuro Re-education     Gait Training     Group Therapy     Non-Billable Service Time     Other     Total Time/Units 55 4          Plan:   [x]  Continues Plan of care: Treatment covered based on POC and graduated to patient's progress. Pt education continues at each visit to obtain maximum benefits from skilled OT intervention. []  Alter Plan of care:   []  Discharge:      Gayle Burn OT/L, [address] [place] [address]    COVID-19 Screening completed upon entering facility and patient cleared for treatment today. Pt followed all protocols set forth by [facility] for Outpatient services. Patient has been made aware of all new hygiene protocols due to COVID-19 set forth by [facility] Outpatient services and agrees to abide by all protocols.

## 2020-09-28 NOTE — PROGRESS NOTES
[address] [facility]                Phone: [phone]   Fax: [phone]    Physical Therapy Daily Treatment Note  Date:  2020    Patient Name:  Katelyn Bustos    :  1955  MRN: [id number]    Referring Physician:  Walden Essex  Insurance Information:  Louisa Sandy      Evaluation date:  20  Diagnosis:  R shoulder pain, adhesive capsulitis   PMH:  R shoulder rotator cuff repair on 20 by Dr. Erna Lunsford, R carpal tunnel release    Evaluating Physical Therapist:  Darshana Woods DPT   Plan of care signed (Y/N):    Visit# / total visits: -       Subjective:   Pt reports that he has been somewhat compliant with HEP. Performed 5x yesterday but only 2 times on Saturday due to being busy. He also reports that he was lying on his back working on his car when the wrench slipped and he bumped his R shoulder on the ground. He reports that his shoulder has been more since since this happened. He rates R shoulder pain as 4/10 prior to session today.          Exercises:   Exercise/Equipment Reps  During/ after  Other comments    Cervical retractions  5x10  I, B  Improved ROM on retest with R and L rotation and retraction        - with extension   3x10  I, B           - with self overpressure                          Manual R shoulder extension stretch  3x10  I, B  Improved R shoulder active flexion on retest                                              HEP Cervical retraction progression           Home Exercise Program:    Cervical retractions      Comments:  Pt tolerated addition of R shoulder extension with improved flexion AROM on restest  Pt cervical AROM improved today with less R sided shoulder and mid/ upper trap pain     Treatment/Activity Tolerance:  [x] Patient tolerated treatment well [] Patient limited by fatique  [] Patient limited by pain  [] Patient limited by other medical complications  [] Other:     Prognosis: [x] Good [x] Fair  []

## 2020-10-01 ENCOUNTER — HOSPITAL ENCOUNTER (OUTPATIENT)
Dept: OCCUPATIONAL THERAPY | Age: 65
Setting detail: THERAPIES SERIES
Discharge: HOME | End: 2020-10-01
Payer: MEDICARE

## 2020-10-01 ENCOUNTER — HOSPITAL ENCOUNTER (OUTPATIENT)
Dept: PHYSICAL THERAPY | Age: 65
Setting detail: THERAPIES SERIES
Discharge: HOME | End: 2020-10-01
Payer: MEDICARE

## 2020-10-01 PROCEDURE — 97022 WHIRLPOOL THERAPY: CPT | Performed by: OCCUPATIONAL THERAPIST

## 2020-10-01 PROCEDURE — 97140 MANUAL THERAPY 1/> REGIONS: CPT | Performed by: OCCUPATIONAL THERAPIST

## 2020-10-01 PROCEDURE — 97140 MANUAL THERAPY 1/> REGIONS: CPT

## 2020-10-01 PROCEDURE — 97110 THERAPEUTIC EXERCISES: CPT | Performed by: OCCUPATIONAL THERAPIST

## 2020-10-01 PROCEDURE — 97110 THERAPEUTIC EXERCISES: CPT

## 2020-10-01 NOTE — PROGRESS NOTES
[address]  [phone]    Date:  10/1/2020     Initial Evaluation Date: 8/3/20                                  Evaluating Therapist: Emma Muñoz     Patient Name:  [facility])                   :  1955     Restrictions/Precautions:  none, low fall risk  Diagnosis:  Swelling R hand /CTR R                                                  Insurance/Certification information:  [facility], Medicare  Referring Practitioner:  Dr. Wayne Joe  Referring Practitioner specific orders: eval and treat. Edema control, etc, rom  Date of Surgery/Injury: CTR 7/15/20  Plan of care signed (Y/N):  No  Visit# / total visits:  Pain Level: moderate, aching    Subjective: \"Some better, but frustrated with cont'ed swelling and stiffness\"     Objective:  Updated POC to be completed by 10th visit. Last reassessment   INTERVENTION: COMPLETED: SPECIFICS/COMMENTS:   Modality:     fluido  *Affected UE: Completes x15 min to promote tissue healing, skin desensitization, reduce inflammation, and decrease pain. Actively completed SROM in all available planes with holds at end range during treatment       Paraffin x To wrist digits    AROM:     Wrist all planes x Increase wrist rom   Pendulums x 2# wt swing all directions  Added to HEP   Mac grasp  15 min   Tendon glides x 15x2   Shoulder int/ext rotation stretches  15x2, for nerve gliding and tightness rotator cuff. Reduce overall pain shoulder/hand   Finger flex/ext x    AAROM:     Shoulder flexion/ex x Wall pulley all directions  Added to HEP        PROM/Stretching:     Wrist /fingers as erin x With putty assist into flexion        Scar Mass/Edema Control:     Soft tissue mobilitzation x 15 min to increase tissue mobiltiy ,. Increase scar mobility        Strengthening:     3# wrist ext/flex x 15x2 increase wrist strength.     digiflex red x 3 min, finger mobility and stengthening   Red flexbar  15x2   Light Putty roll and pegs x 10 min /improving  strength. UBE x 10 min to increase circulation to help decrease swelling   clothespins x allcolors to increase pinch strength. Other:     fes  For finger flexion   kinesio taping x To reduce swelling and pain. Flexion glove /hep x Ongoing To increase active finger flexion   Dry needling  To calm central nervous system/ increase active motion. Hep/rice grasp, tendon glides, towel scrunches x Encouraged to perform 5 x daily to improve finger mobiltiy and reduce swelling and stiffness. Contrast baths/ rice heating pad  2 x daily for reduction of swelling. Improved mobility     REAssessment/Comments: Pt is making Good progress toward stated plan of care. Good erin with strengthening. No sign change todaky. Swelling and morning stiffness cont's. Pt. Advised from doctor to see an arthritic doctor. 9/17/20  Pain level 2- 4-5  Active wrist ext/flex: 58/45  Active opposition increased to P4 level  Active  increased to 1/2 range/3/4 ext cont's   Edema Description/Circumferential Measurements: Mod swelling  Dynamometer (setting 2):                              Left: 82#                                               Right: 15#                     Pinch Meter:              Lateral: Left= 15# ,Right= 12#        Palmar 3 point: Left= 12#, Right= 12#  9 Hole Peg Test:              Left: 20 sec              Right: 39 sec     QuickDASH Score: (Scale 100% full disability, 0 no disability):  increased from initially 75 percent to 60%                        GOALS (Long term same as Short term):  1) Patient will demonstrate good understanding of home program (exercises/activities/diagnosis/prognosis/goals) with good accuracy.  Goal progressing  2) Patient will demonstrate increased active/passive range of motion of their affected extremity/hand to [facility] for ADL/IADL completion./goal progressing  3) Patient will demonstrate increased /pinch strength of at least 10 / 5 pinch pounds of their affected hand. Slow progress  4) Patient to report decreased pain in their affected hand/upper extremity from 2-5/10 to 0-2/10 or less with resistive functional use. Laura Dario progress  5) Increase in fine motor function as evidenced by decreased time to complete 9-hole peg test and/or MRMT test by at least 30 seconds. Goal progressing  6) Patient will be knowledgeable of edema control techniques as evident with decreases from mod to none. No change   7) Patient will report ADL functions same as prior to diagnosis of swelling R hand, CTR/ goal progressing  8) Patient will demonstrate improved functional activity tolerance from assist/mod ind to ind for ADL/IADL completion. Goal progressing  9) Patient will decrease QuickDASH score by 40% for increased participation in daily functional activities. Goal progressing          -Rehab Potential: Good    -Requires OT Follow Up: Yes  Time In:1            Time Out: 2            Treatment Charges: Mins Units   Modalities/fluido 10 1   Ther Exercise 30 2   Manual Therapy 15 1   Thera Activities     ADL/Home Mgt      Neuro Re-education     Gait Training     Group Therapy     Non-Billable Service Time     Other     Total Time/Units 55 4          Plan:   [x]  Continues Plan of care: Treatment covered based on POC and graduated to patient's progress. Pt education continues at each visit to obtain maximum benefits from skilled OT intervention. []  Alter Plan of care:   []  Discharge:      Cathy Ordoñez OT/L, [address] [place] [address]    COVID-19 Screening completed upon entering facility and patient cleared for treatment today. Pt followed all protocols set forth by [facility] for Outpatient services. Patient has been made aware of all new hygiene protocols due to COVID-19 set forth by [facility] Outpatient services and agrees to abide by all protocols.

## 2020-10-01 NOTE — PROGRESS NOTES
[address] [facility]                Phone: [phone]   Fax: [phone]    Physical Therapy Daily Treatment Note  Date:  10/1/2020    Patient Name:  Charles Woodruff    :  1955  MRN: [id number]    Referring Physician:  Alessandra Steele  Insurance Information:  Arun Glover      Evaluation date:  20  Diagnosis:  R shoulder pain, adhesive capsulitis   PMH:  R shoulder rotator cuff repair on 20 by Dr. Soheila Dennis, R carpal tunnel release    Evaluating Physical Therapist:  Rachel Fothergill, DPT   Plan of care signed (Y/N):    Visit# / total visits: 3 /12-       Subjective:   Pt reports that his R shoulder feels stiff but soreness and pain have decreased since last visit. He reports that he continues to perform HEP as instructed.       Exercises:   Exercise/Equipment Reps  During/ after  Other comments    Cervical retractions  3x10  I, B  Improved ROM on retest with R and L rotation and retraction        - with extension   3x10  I, B           - with self overpressure                          Manual R shoulder extension stretch  3x10  I, B  Improved R shoulder active flexion on retest            Manual R shoulder PROM and stretching by PT  30 min  Flexion  Abduction   ER                                 HEP Cervical retraction progression           Home Exercise Program:    Cervical retractions      Comments:  Pt tolerated session well  Gradual improvement in R shoulder and cervical AROM by end of session   Continue to work on R shoulder ROM to improve function and scapulohumeral rhythm   Pt with significant stiffness with R shoulder ER today     Treatment/Activity Tolerance:  [x] Patient tolerated treatment well [] Patient limited by fatique  [] Patient limited by pain  [] Patient limited by other medical complications  [] Other:     Prognosis: [x] Good [x] Fair  [] Poor    Patient Requires Follow-up: [x] Yes  [] No    Plan:   [x] Continue per plan of care [] Alter current plan (see comments)  [] Plan of care initiated [] Hold pending MD visit [] Discharge    Plan for Next Session:    Progress R shoulder ROM and cervical retraction as tolerated next session  Continue to work on R shoulder PROM/ AROM as tolerated       See Weekly Progress Note: []  Yes  [x]  No  Next due:          CPT codes 6/2/2020 Units    Low Complexity PT evaluation [id number]    Moderate Complexity PT evaluation  52950    High Complexity PT evaluation 18503    PT Re-evaluation  93291    Gait training 32884    Manual therapy  [phone] 2   Therapeutic activities  [phone]    Therapeutic exercises  [phone] 2   Neuromuscular reeducation  [id number]        Time In: 1010  Time Out: [phone]    Electronically signed by:    Benjamín Duong DPT  [id number]

## 2020-10-05 ENCOUNTER — HOSPITAL ENCOUNTER (OUTPATIENT)
Dept: OCCUPATIONAL THERAPY | Age: 65
Setting detail: THERAPIES SERIES
Discharge: HOME | End: 2020-10-05
Payer: MEDICARE

## 2020-10-05 ENCOUNTER — HOSPITAL ENCOUNTER (OUTPATIENT)
Dept: PHYSICAL THERAPY | Age: 65
Setting detail: THERAPIES SERIES
Discharge: HOME | End: 2020-10-05
Payer: MEDICARE

## 2020-10-05 PROCEDURE — 97140 MANUAL THERAPY 1/> REGIONS: CPT | Performed by: OCCUPATIONAL THERAPIST

## 2020-10-05 PROCEDURE — 97110 THERAPEUTIC EXERCISES: CPT | Performed by: OCCUPATIONAL THERAPIST

## 2020-10-05 PROCEDURE — 97022 WHIRLPOOL THERAPY: CPT | Performed by: OCCUPATIONAL THERAPIST

## 2020-10-05 NOTE — PROGRESS NOTES
min, finger mobility and stengthening   Red flexbar  15x2   Light Putty roll and pegs x 10 min /improving  strength. UBE x 10 min to increase circulation to help decrease swelling   clothespins x allcolors to increase pinch strength. Other:     fes  For finger flexion   kinesio taping x To reduce swelling and pain. Flexion glove /hep x Ongoing To increase active finger flexion   Dry needling  To calm central nervous system/ increase active motion. Hep/rice grasp, tendon glides, towel scrunches x Encouraged to perform 5 x daily to improve finger mobiltiy and reduce swelling and stiffness. Contrast baths/ rice heating pad  2 x daily for reduction of swelling. Improved mobility     REAssessment/Comments: Pt is making Good progress toward stated plan of care. Stiffness cont's beginning of treatment. Sign more active  and  Mobility at the end of session. Pt. Encouraged to purchase gripper for home use. Discussed available types. 9/17/20  Pain level 2- 4-5  Active wrist ext/flex: 58/45  Active opposition increased to P4 level  Active  increased to 1/2 range/3/4 ext cont's   Edema Description/Circumferential Measurements: Mod swelling  Dynamometer (setting 2):                              Left: 82#                                               Right: 15#                     Pinch Meter:              Lateral: Left= 15# ,Right= 12#        Palmar 3 point: Left= 12#, Right= 12#  9 Hole Peg Test:              Left: 20 sec              Right: 39 sec     QuickDASH Score: (Scale 100% full disability, 0 no disability):  increased from initially 75 percent to 60%                        GOALS (Long term same as Short term):  1) Patient will demonstrate good understanding of home program (exercises/activities/diagnosis/prognosis/goals) with good accuracy.  Goal progressing  2) Patient will demonstrate increased active/passive range of motion of their affected extremity/hand [facility] for [facility] completion./goal progressing  3) Patient will demonstrate increased /pinch strength of at least 10 / 5 pinch pounds of their affected hand. Slow progress  4) Patient to report decreased pain in their affected hand/upper extremity from 2-5/10 to 0-2/10 or less with resistive functional use. [place] progress  5) Increase in fine motor function as evidenced by decreased time to complete 9-hole peg test and/or MRMT test by at least 30 seconds. Goal progressing  6) Patient will be knowledgeable of edema control techniques as evident with decreases from mod to none. No change   7) Patient will report ADL functions same as prior to diagnosis of swelling R hand, CTR/ goal progressing  8) Patient will demonstrate improved functional activity tolerance from assist/mod ind to ind for ADL/IADL completion. Goal progressing  9) Patient will decrease QuickDASH score by 40% for increased participation in daily functional activities. Goal progressing          -Rehab Potential: Good    -Requires OT Follow Up: Yes  Time In:1            Time Out: 2            Treatment Charges: Mins Units   Modalities/fluido 10 1   Ther Exercise 30 2   Manual Therapy 15 1   Thera Activities     ADL/Home Mgt      Neuro Re-education     Gait Training     Group Therapy     Non-Billable Service Time     Other     Total Time/Units 55 4          Plan:   [x]  Continues Plan of care: Treatment covered based on POC and graduated to patient's progress. Pt education continues at each visit to obtain maximum benefits from skilled OT intervention. []  Alter Plan of care:   []  Discharge:      Carmen Juares OT/L, [address] [place], [place] [id number]    COVID-19 Screening completed upon entering facility and patient cleared for treatment today. Pt followed all protocols set forth by [address] [place] [address],[address] for Outpatient services.  Patient has been made aware of all new hygiene protocols due to COVID-19 set forth by [address] [place] [address],[address] Outpatient services and agrees to abide by all protocols.

## 2020-10-05 NOTE — PROGRESS NOTES
[address] [facility]                Phone: [phone]  Fax: [phone]    Physical Therapy  Cancellation/No-show Note  Patient Name:  Radha Stewart  :  1955   Date:  10/5/2020    For today's appointment patient:  [x]  Cancelled  []  Rescheduled appointment  []  No-show     Reason given by patient:  []  Patient ill  []  Conflicting appointment  []  No transportation    []  Conflict with work  []  No reason given  [x]  Other:  session cancelled due to therapist illness    Comments:      Electronically signed by:   Ness Razo

## 2020-10-08 ENCOUNTER — HOSPITAL ENCOUNTER (OUTPATIENT)
Dept: OCCUPATIONAL THERAPY | Age: 65
Setting detail: THERAPIES SERIES
Discharge: HOME | End: 2020-10-08
Payer: MEDICARE

## 2020-10-08 ENCOUNTER — HOSPITAL ENCOUNTER (OUTPATIENT)
Dept: PHYSICAL THERAPY | Age: 65
Setting detail: THERAPIES SERIES
Discharge: HOME | End: 2020-10-08
Payer: MEDICARE

## 2020-10-08 PROCEDURE — 97140 MANUAL THERAPY 1/> REGIONS: CPT

## 2020-10-08 PROCEDURE — 97140 MANUAL THERAPY 1/> REGIONS: CPT | Performed by: OCCUPATIONAL THERAPIST

## 2020-10-08 PROCEDURE — 97022 WHIRLPOOL THERAPY: CPT | Performed by: OCCUPATIONAL THERAPIST

## 2020-10-08 PROCEDURE — 97110 THERAPEUTIC EXERCISES: CPT | Performed by: OCCUPATIONAL THERAPIST

## 2020-10-08 NOTE — PROGRESS NOTES
[address] [facility]                Phone: [phone]   Fax: [phone]    Physical Therapy Daily Treatment Note  Date:  10/8/2020    Patient Name:  Jennifer Galindo    :  1955  MRN: [id number]    Referring Physician:  Army Bansal  Insurance Information:  Aileen Mi      Evaluation date:  20  Diagnosis:  R shoulder pain, adhesive capsulitis   PMH:  R shoulder rotator cuff repair on 20 by Dr. Cindy Barrera, R carpal tunnel release    Evaluating Physical Therapist:  Cat Raygoza DPT   Plan of care signed (Y/N):    Visit# / total visits: -       Subjective:   Pt reports that his R shoulder has been very painful for a week now. He states that he was performing his shoulder extension stretches a week ago and had a lot of pain following them. He denies sharp pain, pop, or snap sensation during stretches. He reports that he stopped doing the stretches after this because he was so sore. He has continued to be compliant with cervical HEP with no issues. Exercises: (NA- see comments below)   Exercise/Equipment Reps  During/ after  Other comments    Cervical retractions  3x10  I, B  Improved ROM on retest with R and L rotation and retraction        - with extension   3x10  I, B           - with self overpressure                          Manual R shoulder extension stretch  3x10  I, B  Improved R shoulder active flexion on retest            Manual R shoulder PROM and stretching by PT  30 min  Flexion  Abduction   ER                                 HEP Cervical retraction progression           Home Exercise Program:    Cervical retractions      Comments:  Pt able to actively flex, elevate, abduct, and IR/ ER R shoulder today. Increased soreness with AROM but all movements remain intact. R rotator cuff strength grossly the same as last visit with no significant new weakness noted despite increased soreness over the last week.       Spent entire

## 2020-10-08 NOTE — PROGRESS NOTES
[address]  [phone]    Date:  10/8/2020     Initial Evaluation Date: 8/3/20                                  Evaluating Therapist: Fransico Red     Patient Name:  Elis [facility])                   :  1955     Restrictions/Precautions:  none, low fall risk  Diagnosis:  Swelling R hand /CTR R                                                  Insurance/Certification information:  Dave P[employer], Medicare  Referring Practitioner:  Dr. Shen Parrish  Referring Practitioner specific orders: eval and treat. Edema control, etc, rom. New orders 20 cont hand program. Pt. to see PT. for shoulder. Date of Surgery/Injury: CTR 7/15/20  Plan of care signed (Y/N):  No  Visit# / total visits: +12=30   Pain Level: moderate, aching    Subjective: \" I decided to pull some weight and increase exercising at home\"     Objective:  Updated POC to be completed by 10th visit. Last reassessment   INTERVENTION: COMPLETED: SPECIFICS/COMMENTS:   Modality:     fluido  *Affected UE: Completes x15 min to promote tissue healing, skin desensitization, reduce inflammation, and decrease pain. Actively completed SROM in all available planes with holds at end range during treatment       Paraffin x To wrist digits    AROM:     Wrist all planes x Increase wrist rom   Pendulums  2# wt swing all directions  Added to HEP   Mac grasp  15 min   Tendon glides x 15x2   Shoulder int/ext rotation stretches  15x2, for nerve gliding and tightness rotator cuff. Reduce overall pain shoulder/hand   Finger flex/ext x    AAROM:     Shoulder flexion/ex x Wall pulley all directions  Added to HEP        PROM/Stretching:     Wrist /fingers as erin x With putty assist into flexion        Scar Mass/Edema Control:     Soft tissue mobilitzation x 15 min to increase tissue mobiltiy ,. Increase scar mobility        Strengthening:     3# wrist ext/flex x 15x2 increase wrist strength.     digiflex red x 3 min, finger mobility and stengthening   Red flexbar  15x2   Light Putty roll and pegs x 10 min /improving  strength. UBE x 10 min to increase circulation to help decrease swelling   clothespins x allcolors to increase pinch strength. Other:     fes  For finger flexion   kinesio taping x To reduce swelling and pain. Flexion glove /hep x Ongoing To increase active finger flexion   Dry needling  To calm central nervous system/ increase active motion. Hep/rice grasp, tendon glides, towel scrunches x Encouraged to perform 5 x daily to improve finger mobiltiy and reduce swelling and stiffness. Contrast baths/ rice heating pad  2 x daily for reduction of swelling. Improved mobility     REAssessment/Comments: Pt is making Good progress toward stated plan of care. Stiffness cont's beginning of treatment. Improved finger flexion and finger manipulation at the end of session. 9/17/20  Pain level 2- 4-5  Active wrist ext/flex: 58/45  Active opposition increased to P4 level  Active  increased to 1/2 range/3/4 ext cont's   Edema Description/Circumferential Measurements: Mod swelling  Dynamometer (setting 2):                              Left: 82#                                               Right: 15#                     Pinch Meter:              Lateral: Left= 15# ,Right= 12#        Palmar 3 point: Left= 12#, Right= 12#  9 Hole Peg Test:              Left: 20 sec              Right: 31 sec     QuickDASH Score: (Scale 100% full disability, 0 no disability):  Decreased from initially 75 percent to 60%                        GOALS (Long term same as Short term):  1) Patient will demonstrate good understanding of home program (exercises/activities/diagnosis/prognosis/goals) with good accuracy.  Goal progressing  2) Patient will demonstrate increased active/passive range of motion of their affected extremity/hand to [facility] for ADL/IADL completion./goal progressing  3) Patient will demonstrate increased /pinch strength of at least 10 / 5 pinch pounds of their affected hand. Slow progress  4) Patient to report decreased pain in their affected hand/upper extremity from 2-5/10 to 0-2/10 or less with resistive functional use. Rosita Lambert progress  5) Increase in fine motor function as evidenced by decreased time to complete 9-hole peg test and/or MRMT test by at least 30 seconds. Goal progressing  6) Patient will be knowledgeable of edema control techniques as evident with decreases from mod to none. No change   7) Patient will report ADL functions same as prior to diagnosis of swelling R hand, CTR/ goal progressing  8) Patient will demonstrate improved functional activity tolerance from assist/mod ind to ind for ADL/IADL completion. Goal progressing  9) Patient will decrease QuickDASH score by 40% for increased participation in daily functional activities. Goal progressing          -Rehab Potential: Good    -Requires OT Follow Up: Yes  Time In:1            Time Out: 2            Treatment Charges: Mins Units   Modalities/fluido 10 1   Ther Exercise 30 2   Manual Therapy 15 1   Thera Activities     ADL/Home Mgt      Neuro Re-education     Gait Training     Group Therapy     Non-Billable Service Time     Other     Total Time/Units 55 4          Plan:   [x]  Continues Plan of care: Treatment covered based on POC and graduated to patient's progress. Pt education continues at each visit to obtain maximum benefits from skilled OT intervention. []  Alter Plan of care:   []  Discharge:      Lavonne Mahajano OT/L, [address] [place], [place] [id number]    COVID-19 Screening completed upon entering facility and patient cleared for treatment today. Pt followed all protocols set forth by [facility] for Outpatient services. Patient has been made aware of all new hygiene protocols due to COVID-19 set forth by [facility] Outpatient services and agrees to abide by all protocols.

## 2020-10-12 ENCOUNTER — HOSPITAL ENCOUNTER (OUTPATIENT)
Dept: OCCUPATIONAL THERAPY | Age: 65
Setting detail: THERAPIES SERIES
Discharge: HOME | End: 2020-10-12
Payer: MEDICARE

## 2020-10-12 ENCOUNTER — HOSPITAL ENCOUNTER (OUTPATIENT)
Dept: PHYSICAL THERAPY | Age: 65
Setting detail: THERAPIES SERIES
Discharge: HOME | End: 2020-10-12
Payer: MEDICARE

## 2020-10-12 PROCEDURE — 97140 MANUAL THERAPY 1/> REGIONS: CPT

## 2020-10-12 PROCEDURE — 97110 THERAPEUTIC EXERCISES: CPT

## 2020-10-12 PROCEDURE — 97140 MANUAL THERAPY 1/> REGIONS: CPT | Performed by: OCCUPATIONAL THERAPIST

## 2020-10-12 PROCEDURE — 97110 THERAPEUTIC EXERCISES: CPT | Performed by: OCCUPATIONAL THERAPIST

## 2020-10-12 PROCEDURE — 97022 WHIRLPOOL THERAPY: CPT | Performed by: OCCUPATIONAL THERAPIST

## 2020-10-12 NOTE — PROGRESS NOTES
[address] [facility]                Phone: [phone]   Fax: [phone]    Physical Therapy Daily Treatment Note  Date:  10/12/2020    Patient Name:  Tami Klein    :  1955  MRN: [id number]    Referring Physician:  Jeet Bustamante  Insurance Information:  Verenice Parks      Evaluation date:  20  Diagnosis:  R shoulder pain, adhesive capsulitis   PMH:  R shoulder rotator cuff repair on 20 by Dr. Renaldo Velazquez, R carpal tunnel release    Evaluating Physical Therapist:  Clement Glasgow DPT   Plan of care signed (Y/N):    Visit# / total visits: -       Subjective:   Pt reports R shoulder soreness as 2/10 prior to session today. Has been using pulleys at home for ROM. 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 States the other day a bolt let loose which caused some increased shoulder soreness but is better now. Exercises: (NA- see comments below)   Exercise/Equipment Reps  During/ after  Other comments   NT Cervical retractions  3x10  I, B  Improved ROM on retest with R and L rotation and retraction        - with extension   3x10  I, B           - with self overpressure                         NT Manual R shoulder extension stretch  3x10  I, B  Improved R shoulder active flexion on retest            Manual R shoulder PROM and stretching by PT  40 min  Flexion  Abduction   ER             Pulleys  5 min flexion   5 min abduction                    HEP Cervical retraction progression           Home Exercise Program:    Cervical retractions      Comments:  Pt tolerated session well today.   R shoulder AROM grossly:    Flexion 170 ° (elbow almost straight at end range)    Abduction 170 ° (elbow slightly flexed at end range but improved since last session)     IR L5 midline (still very stiff)    ER C5      Treatment/Activity Tolerance:  [x] Patient tolerated treatment well [] Patient limited by fatique  [] Patient limited by pain  [] Patient limited by other medical complications  [] Other:     Prognosis: [x] Good [x] Fair  [] Poor    Patient Requires Follow-up: [x] Yes  [] No    Plan:   [x] Continue per plan of care [] Alter current plan (see comments)  [] Plan of care initiated [] Hold pending MD visit [] Discharge    Plan for Next Session:    Progress R shoulder ROM and cervical retraction as tolerated next session  Continue to work on R shoulder PROM/ AROM as tolerated       See Weekly Progress Note: []  Yes  [x]  No  Next due:          CPT codes 6/2/2020 Units    Low Complexity PT evaluation 76078 [id number]    Moderate Complexity PT evaluation  13604    High Complexity PT evaluation 80459    PT Re-evaluation  66580    Gait training 61465    Manual therapy  [phone] 3   Therapeutic activities  97384    Therapeutic exercises  [phone]   Neuromuscular reeducation  [id number]        Time In: 4863  Time Out: 1000    Electronically signed by:    Gladys Mendoza DPT  [id number]

## 2020-10-12 NOTE — PROGRESS NOTES
[address]  [phone]    Date:  10/12/2020     Initial Evaluation Date: 8/3/20                                  Evaluating Therapist: Valdez Baldwin     [facility]                   :  1955     Restrictions/Precautions:  none, low fall risk  Diagnosis:  Swelling R hand /CTR R                                                  Insurance/Certification information:  Nilsa Caballero, Medicare  Referring Practitioner:  Dr. Betito Gibbons  Referring Practitioner specific orders: eval and treat. Edema control, etc, rom. New orders 20 cont hand program. Pt. to see PT. for shoulder. Date of Surgery/Injury: CTR 7/15/20  Plan of care signed (Y/N):  No  Visit# / total visits: +12=30       Pain Level: moderate, aching    Subjective: By end of session, pt reports \"My hand feels pretty loose, I can almost get it in a full fist.\"     Objective:  Updated POC to be completed by 10th visit. Last reassessment   INTERVENTION: COMPLETED: SPECIFICS/COMMENTS:   Modality:     fluido x 15 min - To promote tissue healing, skin desensitization, reduce inflammation, and decrease pain. Actively completed SROM in all available planes with holds at end range during treatment       Paraffin  To wrist digits    AROM:     Wrist all planes  Increase wrist rom   Pendulums  2# wt swing all directions  Added to HEP   Mac grasp  15 min   Tendon glides  15x2   Shoulder int/ext rotation stretches  15x2, for nerve gliding and tightness rotator cuff. Reduce overall pain shoulder/hand   Place and Holds x 12 reps in full fist, 8 reps in hook fist. Increased ROM noted. Fist Release and Place x Followed after place and hold, pt to open digits in extension and quickly place digits back into full flexion in attempts to achieve same motion with place and hold.     Fist Water Bead [employer] x Water bead ball placed in palm of hand, digits flexed and wrapped with co-band to increase flexion. Pt to complete pumps with focus on digging DIPs and PIPs into ball.  reps. Finger flex/ext     AAROM:     Shoulder flexion/ex x Wall pulley all directions  Added to HEP        PROM/Stretching:     Wrist /fingers as erin x 15 min Focus on full fist and hook fist. ^'d aggressivenses as tolerated. Putty Fist Rolls x 15 reps fist rolling in putty to loosen digits prior to PROM   Scar Mass/Edema Control:     Soft tissue mobilitzation x 10 min to increase tissue mobiltiy ,. Increase scar mobility        Strengthening:     3# wrist ext/flex  15x2 increase wrist strength. digiflex red  3 min, finger mobility and stengthening   Red flexbar  15x2   Light Putty roll and pegs  10 min /improving  strength. UBE  10 min to increase circulation to help decrease swelling   clothespins  allcolors to increase pinch strength. Other:     fes  For finger flexion   kinesio taping  To reduce swelling and pain. Flexion glove /hep x Ongoing - To increase active finger flexion. Discussed flexion glove, pt feeling as though he is not getting enough pull in PIPs and DIPs. Asked pt to bring glove in for re-adjustments, possible splint fabrication to block MCPs. Dry needling  To calm central nervous system/ increase active motion. Hep/rice grasp, tendon glides, towel scrunches x Encouraged to perform 5 x daily to improve finger mobiltiy and reduce swelling and stiffness. Contrast baths/ rice heating pad  2 x daily for reduction of swelling. Improved mobility     REAssessment/Comments: Pt is making Good progress toward stated plan of care. Good tolerance for PROM, therapist able to stretch pt into full fist. Pt able to hold fist about  0.3-0.5 cm from [facility]. Pt educated on joint manipulation to increase ROM at joints and reduce pain/fluid build-up. By end of session fist felt loose and pt was able to functionally close it. He continues to demonstrate compliance with HEP.  Added ex's today to HEP as they resulted in positive progress. 9/17/20  Pain level 2- 4-5  Active wrist ext/flex: 58/45  Active opposition increased to P4 level  Active  increased to 1/2 range/3/4 ext cont's   Edema Description/Circumferential Measurements: Mod swelling  Dynamometer (setting 2):                              Left: 82#                                               Right: 15#                     Pinch Meter:              Lateral: Left= 15# ,Right= 12#        Palmar 3 point: Left= 12#, Right= 12#  9 Hole Peg Test:              Left: 20 sec              Right: 31 sec     QuickDASH Score: (Scale 100% full disability, 0 no disability):  Decreased from initially 75 percent to 60%                        GOALS (Long term same as Short term):  1) Patient will demonstrate good understanding of home program (exercises/activities/diagnosis/prognosis/goals) with good accuracy. Goal progressing  2) Patient will demonstrate increased active/passive range of motion of their affected extremity/hand to [facility] for ADL/IADL completion./goal progressing  3) Patient will demonstrate increased /pinch strength of at least 10 / 5 pinch pounds of their affected hand. Slow progress  4) Patient to report decreased pain in their affected hand/upper extremity from 2-5/10 to 0-2/10 or less with resistive functional use. Sigrid Bays progress  5) Increase in fine motor function as evidenced by decreased time to complete 9-hole peg test and/or MRMT test by at least 30 seconds. Goal progressing  6) Patient will be knowledgeable of edema control techniques as evident with decreases from mod to none. No change   7) Patient will report ADL functions same as prior to diagnosis of swelling R hand, CTR/ goal progressing  8) Patient will demonstrate improved functional activity tolerance from assist/mod ind to ind for ADL/IADL completion.  Goal progressing  9) Patient will decrease QuickDASH score by 40% for increased participation in daily functional activities. Goal progressing          -Rehab Potential: Good    -Requires OT Follow Up: Yes  Time In: 10:00 am            Time Out: 11:00 am           Treatment Charges: Mins Units   Modalities/fluido 15 1   Ther Exercise 20 1   Manual Therapy 25 2   Thera Activities     ADL/Home Mgt      Neuro Re-education     Gait Training     Group Therapy     Non-Billable Service Time     Other     Total Time/Units 60 4     Plan:   [x]  Continues Plan of care: Treatment covered based on POC and graduated to patient's progress. Pt education continues at each visit to obtain maximum benefits from skilled OT intervention. []  Alter Plan of care:   []  Discharge:      Reianldo Andrews [address] 87, OTR/L #[id number]    COVID-19 Screening completed upon entering facility and patient cleared for treatment today. Pt followed all protocols set forth by [facility] for Outpatient services. Patient has been made aware of all new hygiene protocols due to COVID-19 set forth by [facility] Outpatient services and agrees to abide by all protocols.

## 2020-10-15 ENCOUNTER — HOSPITAL ENCOUNTER (OUTPATIENT)
Dept: OCCUPATIONAL THERAPY | Age: 65
Setting detail: THERAPIES SERIES
Discharge: HOME | End: 2020-10-15
Payer: MEDICARE

## 2020-10-15 ENCOUNTER — HOSPITAL ENCOUNTER (OUTPATIENT)
Dept: PHYSICAL THERAPY | Age: 65
Setting detail: THERAPIES SERIES
Discharge: HOME | End: 2020-10-15
Payer: MEDICARE

## 2020-10-15 PROCEDURE — 97110 THERAPEUTIC EXERCISES: CPT | Performed by: OCCUPATIONAL THERAPIST

## 2020-10-15 PROCEDURE — 97110 THERAPEUTIC EXERCISES: CPT

## 2020-10-15 PROCEDURE — 97022 WHIRLPOOL THERAPY: CPT | Performed by: OCCUPATIONAL THERAPIST

## 2020-10-15 PROCEDURE — 97140 MANUAL THERAPY 1/> REGIONS: CPT | Performed by: OCCUPATIONAL THERAPIST

## 2020-10-15 PROCEDURE — 97140 MANUAL THERAPY 1/> REGIONS: CPT

## 2020-10-15 NOTE — PROGRESS NOTES
[address] [facility]                Phone: [phone]   Fax: [phone]    Physical Therapy Daily Treatment Note  Date:  10/15/2020    Patient Name:  Princess Betancourt    :  1955  MRN: [id number]    Referring Physician:  Gloria Jonas  Insurance Information:  Jamel Moyer      Evaluation date:  20  Diagnosis:  R shoulder pain, adhesive capsulitis   PMH:  R shoulder rotator cuff repair on 20 by Dr. Alexa Brady, R carpal tunnel release    Evaluating Physical Therapist:  Julia Maya DPT   Plan of care signed (Y/N):    Visit# / total visits: -       Subjective:   Pt reports that R shoulder soreness is 2/10 this morning. He reports that he spent a few hours hand sanding a handrail yesterday and his shoulder muscles are sore today. Exercises: (NA- see comments below)   Exercise/Equipment Reps  During/ after  Other comments   NT Cervical retractions  3x10  I, B  Improved ROM on retest with R and L rotation and retraction        - with extension   3x10  I, B           - with self overpressure                         NT Manual R shoulder extension stretch  3x10  I, B  Improved R shoulder active flexion on retest            Manual R shoulder PROM and stretching by PT  45 min  Flexion  Abduction   ER      Scapular mobilizations and PROM  10 min  Retraction  Protraction   Upward rotation      Pulleys  4 min flexion   4 min abduction                    HEP Cervical retraction progression           Home Exercise Program:    Cervical retractions      Comments:  Pt tolerated session well today.   Focus of entire session today was on improving R shoulder ROM   Performed a lot of end range shoulder flexion stretching with tack and stretch of lats to improve ROM   Manual mobilizations of R scapula in all directions especially upward rotation to improve scapulohumeral mechanics when reaching overhead    Heat applied to R shoulder prior to stretching today for 8 min with no adverse skin reactions     Pt with improved R shoulder AROM following manual stretching and PROM today   Continues to lack AROM with R shoulder IR and also has slight shrug with overactive upper trap when reaching overhead        Treatment/Activity Tolerance:  [x] Patient tolerated treatment well [] Patient limited by fatique  [] Patient limited by pain  [] Patient limited by other medical complications  [] Other:     Prognosis: [x] Good [x] Fair  [] Poor    Patient Requires Follow-up: [x] Yes  [] No    Plan:   [x] Continue per plan of care [] Alter current plan (see comments)  [] Plan of care initiated [] Hold pending MD visit [] Discharge    Plan for Next Session:    Progress R shoulder ROM and cervical retraction as tolerated next session  Continue to work on R shoulder PROM/ AROM as tolerated       See Weekly Progress Note: []  Yes  [x]  No  Next due:          CPT codes 6/2/2020 Units    Low Complexity PT evaluation 15880 59433    Moderate Complexity PT evaluation  26489    High Complexity PT evaluation 40047    PT Re-evaluation  09970    Gait training 51749    Manual therapy  [phone] 3   Therapeutic activities  70994    Therapeutic exercises  17050 1   Neuromuscular reeducation  [phone]        Time In: 3689  Time Out: 1000    Electronically signed by:    Ginette Rosales DPT  [id number]

## 2020-10-15 NOTE — PROGRESS NOTES
[address]  [phone]    Date:  10/15/2020     Initial Evaluation Date: 8/3/20                                  Evaluating Therapist: Vika Scott     Patient Name:  [facility])                   :  1955     Restrictions/Precautions:  none, low fall risk  Diagnosis:  Swelling R hand /CTR R                                                  Insurance/Certification information:  [facility], Medicare  Referring Practitioner:  Dr. Leonela Carrera  Referring Practitioner specific orders: eval and treat. Edema control, etc, rom. New orders 20 cont hand program. Pt. to see PT. for shoulder. Date of Surgery/Injury: CTR 7/15/20  Plan of care signed (Y/N):  No  Visit# / total visits: +12=30       Pain Level: moderate, aching    Subjective: By end of session, pt reports \"my hand is moving some better, very frustrated though how it wants to go back being stiff. \"     Objective:  Updated POC to be completed by 10th visit. Last reassessment   INTERVENTION: COMPLETED: SPECIFICS/COMMENTS:   Modality:     fluido x 15 min - To promote tissue healing, skin desensitization, reduce inflammation, and decrease pain. Actively completed SROM in all available planes with holds at end range during treatment       Paraffin  To wrist digits    AROM:     Wrist all planes  Increase wrist rom   Pendulums  2# wt swing all directions  Added to HEP   Mac grasp  15 min   Tendon glides  15x2   Shoulder int/ext rotation stretches  15x2, for nerve gliding and tightness rotator cuff. Reduce overall pain shoulder/hand   Place and Holds x 12 reps in full fist, 8 reps in hook fist. Increased ROM noted. Fist Release and Place x Followed after place and hold, pt to open digits in extension and quickly place digits back into full flexion in attempts to achieve same motion with place and hold.     Fist Water Bead Richard [employer] Richard x Water bead ball placed in palm of hand, digits flexed and wrapped with co-band to increase flexion. Pt to complete pumps with focus on digging DIPs and PIPs into ball.  reps. Finger flex/ext     AAROM:     Shoulder flexion/ex x Wall pulley all directions  Added to HEP        PROM/Stretching:     Wrist /fingers as erin x 15 min Focus on full fist and hook fist. ^'d aggressivenses as tolerated. Putty Fist Rolls x 15 reps fist rolling in putty to loosen digits prior to PROM   Scar Mass/Edema Control:     Soft tissue mobilitzation x 10 min to increase tissue mobiltiy ,. Increase scar mobility        Strengthening:     3# wrist ext/flex  15x2 increase wrist strength. digiflex red  3 min, finger mobility and stengthening   Red flexbar  15x2   Light Putty roll and pegs  10 min /improving  strength. UBE  10 min to increase circulation to help decrease swelling   clothespins  allcolors to increase pinch strength. Other:     fes  For finger flexion   kinesio taping  To reduce swelling and pain. Flexion glove /hep x Ongoing - To increase active finger flexion. Discussed flexion glove, pt feeling as though he is not getting enough pull in PIPs and DIPs. Asked pt to bring glove in for re-adjustments, possible splint fabrication to block MCPs. Dry needling  To calm central nervous system/ increase active motion. Hep/rice grasp, tendon glides, towel scrunches x Encouraged to perform 5 x daily to improve finger mobiltiy and reduce swelling and stiffness. Contrast baths/ rice heating pad  2 x daily for reduction of swelling. Improved mobility     REAssessment/Comments: Pt is making Good progress toward stated plan of care. Today increased active flexion to near touching palm after stretching. PT felt encouraged from this. Encouraged to cont with aggressive hep.   9/17/20  Pain level 2- 4-5  Active wrist ext/flex: 58/45  Active opposition increased to P4 level  Active  increased to 1/2 range/3/4 ext cont's   Edema Description/Circumferential Measurements: Mod swelling  Dynamometer (setting 2):                              Left: 82#                                               Right: 15#                     Pinch Meter:              Lateral: Left= 15# ,Right= 12#        Palmar 3 point: Left= 12#, Right= 12#  9 Hole Peg Test:              Left: 20 sec              Right: 31 sec     QuickDASH Score: (Scale 100% full disability, 0 no disability):  Decreased from initially 75 percent to 60%                        GOALS (Long term same as Short term):  1) Patient will demonstrate good understanding of home program (exercises/activities/diagnosis/prognosis/goals) with good accuracy. Goal progressing  2) Patient will demonstrate increased active/passive range of motion of their affected extremity/hand to [facility] for ADL/IADL completion./goal progressing  3) Patient will demonstrate increased /pinch strength of at least 10 / 5 pinch pounds of their affected hand. Slow progress  4) Patient to report decreased pain in their affected hand/upper extremity from 2-5/10 to 0-2/10 or less with resistive functional use. [place]a [place] progress  5) Increase in fine motor function as evidenced by decreased time to complete 9-hole peg test and/or MRMT test by at least 30 seconds. Goal progressing  6) Patient will be knowledgeable of edema control techniques as evident with decreases from mod to none. No change   7) Patient will report ADL functions same as prior to diagnosis of swelling R hand, CTR/ goal progressing  8) Patient will demonstrate improved functional activity tolerance from assist/mod ind to ind for ADL/IADL completion. Goal progressing  9) Patient will decrease QuickDASH score by 40% for increased participation in daily functional activities.  Goal progressing          -Rehab Potential: Good    -Requires OT Follow Up: Yes  Time In: 10:00 am            Time Out: 11:00 am           Treatment Charges: Mins Units   Modalities/fluido 15 1   Ther Exercise 20 1   Manual Therapy 25 2   Thera Activities     ADL/Home Mgt      Neuro Re-education     Gait Training     Group Therapy     Non-Billable Service Time     Other     Total Time/Units 60 4     Plan:   [x]  Continues Plan of care: Treatment covered based on POC and graduated to patient's progress. Pt education continues at each visit to obtain maximum benefits from skilled OT intervention. []  Alter Plan of care:   []  Discharge:      Hossein Guevara OT/L, [address] [place], [place] [id number]    COVID-19 Screening completed upon entering facility and patient cleared for treatment today. Pt followed all protocols set forth by [facility] for Outpatient services. Patient has been made aware of all new hygiene protocols due to COVID-19 set forth by [facility] Outpatient services and agrees to abide by all protocols.

## 2020-10-19 ENCOUNTER — HOSPITAL ENCOUNTER (OUTPATIENT)
Dept: OCCUPATIONAL THERAPY | Age: 65
Setting detail: THERAPIES SERIES
Discharge: HOME | End: 2020-10-19
Payer: MEDICARE

## 2020-10-19 NOTE — PROGRESS NOTES
Phone: [phone] Fax: [phone]     Occupational Therapy   Cancellation/No-show Note     Patient Name:  Tami Klein  : 1955  Date:  10/19/2020  MRN: [id number]    For today's appointment patient:   [x]  Cancelled   []  Rescheduled appointment   []  No-show     Reason given by patient:   []  Patient ill   []  Conflicting appointment   []  No transportation   []  Conflict with work   [x]  No reason given   []  Other:    Comments:     Electronically signed by: Mayte Hoang OT/L, [address] [place] [address]

## 2020-10-22 ENCOUNTER — HOSPITAL ENCOUNTER (OUTPATIENT)
Dept: PHYSICAL THERAPY | Age: 65
Setting detail: THERAPIES SERIES
Discharge: HOME | End: 2020-10-22
Payer: MEDICARE

## 2020-10-22 ENCOUNTER — HOSPITAL ENCOUNTER (OUTPATIENT)
Dept: OCCUPATIONAL THERAPY | Age: 65
Setting detail: THERAPIES SERIES
Discharge: HOME | End: 2020-10-22
Payer: MEDICARE

## 2020-10-22 PROCEDURE — 97022 WHIRLPOOL THERAPY: CPT | Performed by: OCCUPATIONAL THERAPIST

## 2020-10-22 PROCEDURE — 97140 MANUAL THERAPY 1/> REGIONS: CPT

## 2020-10-22 PROCEDURE — 97110 THERAPEUTIC EXERCISES: CPT | Performed by: OCCUPATIONAL THERAPIST

## 2020-10-22 PROCEDURE — 97140 MANUAL THERAPY 1/> REGIONS: CPT | Performed by: OCCUPATIONAL THERAPIST

## 2020-10-22 PROCEDURE — 9900000067 HC THERAPEUTIC EXERCISE EA 15 MINS (SELF-PAY)

## 2020-10-22 NOTE — PROGRESS NOTES
[address]  [phone]    Date:  10/22/2020     Initial Evaluation Date: 8/3/20                                  Evaluating Therapist: Curtis Persaud     Patient Name:  [facility])                   :  1955     Restrictions/Precautions:  none, low fall risk  Diagnosis:  Swelling R hand /CTR R                                                  Insurance/Certification information:  [employer] Ibarra, Medicare  Referring Practitioner:  Dr. Mirela Johnson  Referring Practitioner specific orders: eval and treat. Edema control, etc, rom. New orders 20 cont hand program. Pt. to see PT. for shoulder. Date of Surgery/Injury: CTR 7/15/20  Plan of care signed (Y/N):  No  Visit# / total visits: +12=30       Pain Level: moderate, aching    Subjective: Pt. States\"frustrated my hand still stiffens up. Index and small fingers are better, but middle and ring still struggling. \"     Objective:  Updated POC to be completed by 10th visit. Last reassessment   INTERVENTION: COMPLETED: SPECIFICS/COMMENTS:   Modality:     fluido x 15 min - To promote tissue healing, skin desensitization, reduce inflammation, and decrease pain. Actively completed SROM in all available planes with holds at end range during treatment       Paraffin  To wrist digits    AROM:     Wrist all planes  Increase wrist rom   Pendulums  2# wt swing all directions  Added to HEP   Mac grasp  15 min   Tendon glides  15x2   Shoulder int/ext rotation stretches  15x2, for nerve gliding and tightness rotator cuff. Reduce overall pain shoulder/hand   Place and Holds x 12 reps in full fist, 8 reps in hook fist. Increased ROM noted. Fist Release and Place x Followed after place and hold, pt to open digits in extension and quickly place digits back into full flexion in attempts to achieve same motion with place and hold.     Fist Water Bead Richard [employer] Richard x Water bead ball placed in palm of hand, digits flexed and wrapped with co-band to increase flexion. Pt to complete pumps with focus on digging DIPs and PIPs into ball.  reps. Finger flex/ext     AAROM:     Shoulder flexion/ex x Wall pulley all directions  Added to HEP        PROM/Stretching:     Wrist /fingers as erin x 15 min Focus on full fist and hook fist. ^'d aggressivenses as tolerated. Putty Fist Rolls x 15 reps fist rolling in putty to loosen digits prior to PROM   Scar Mass/Edema Control:     Soft tissue mobilitzation x 10 min to increase tissue mobiltiy ,. Increase scar mobility        Strengthening:     3# wrist ext/flex  15x2 increase wrist strength. digiflex red  3 min, finger mobility and stengthening   Red flexbar  15x2   Light Putty roll and pegs  10 min /improving  strength. UBE  10 min to increase circulation to help decrease swelling   clothespins  allcolors to increase pinch strength. Other:     fes  For finger flexion   kinesio taping  To reduce swelling and pain. Flexion glove /hep x Ongoing - To increase active finger flexion. Discussed flexion glove, pt feeling as though he is not getting enough pull in PIPs and DIPs. Asked pt to bring glove in for re-adjustments, possible splint fabrication to block MCPs. Dry needling  To calm central nervous system/ increase active motion. Hep/rice grasp, tendon glides, towel scrunches x Encouraged to perform 5 x daily to improve finger mobiltiy and reduce swelling and stiffness. Contrast baths/ rice heating pad  2 x daily for reduction of swelling. Improved mobility     REAssessment/Comments: Pt is making Good progress toward stated plan of care. Progressing with rom index and small fingers. Tightness cont's other digits, but near full flexion at the end of session. 9/17/20  Pain level 2- 4-5  Active wrist ext/flex: 58/45  Active opposition increased to P4 level  Active  increased to 1/2 range/3/4 ext cont's   Edema Description/Circumferential Measurements:               Mod swelling  Dynamometer (setting 2):                              Left: 82#                                               Right: 15#                     Pinch Meter:              Lateral: Left= 15# ,Right= 12#        Palmar 3 point: Left= 12#, Right= 12#  9 Hole Peg Test:              Left: 20 sec              Right: 31 sec     QuickDASH Score: (Scale 100% full disability, 0 no disability):  Decreased from initially 75 percent to 60%                        GOALS (Long term same as Short term):  1) Patient will demonstrate good understanding of home program (exercises/activities/diagnosis/prognosis/goals) with good accuracy. Goal progressing  2) Patient will demonstrate increased active/passive range of motion of their affected extremity/hand to [facility] for ADL/IADL completion./goal progressing  3) Patient will demonstrate increased /pinch strength of at least 10 / 5 pinch pounds of their affected hand. Slow progress  4) Patient to report decreased pain in their affected hand/upper extremity from 2-5/10 to 0-2/10 or less with resistive functional use. [place] progress  5) Increase in fine motor function as evidenced by decreased time to complete 9-hole peg test and/or MRMT test by at least 30 seconds. Goal progressing  6) Patient will be knowledgeable of edema control techniques as evident with decreases from mod to none. No change   7) Patient will report ADL functions same as prior to diagnosis of swelling R hand, CTR/ goal progressing  8) Patient will demonstrate improved functional activity tolerance from assist/mod ind to ind for ADL/IADL completion. Goal progressing  9) Patient will decrease QuickDASH score by 40% for increased participation in daily functional activities.  Goal progressing          -Rehab Potential: Good    -Requires OT Follow Up: Yes  Time In: 10:00 am            Time Out: 11:00 am           Treatment Charges: Mins Units   Modalities/fluido 15 1   Ther Exercise 20 1   Manual Therapy 25 2 Thera Activities     ADL/Home Mgt      Neuro Re-education     Gait Training     Group Therapy     Non-Billable Service Time     Other     Total Time/Units 60 4     Plan:   [x]  Continues Plan of care: Treatment covered based on POC and graduated to patient's progress. Pt education continues at each visit to obtain maximum benefits from skilled OT intervention. []  Alter Plan of care:   []  Discharge:      Fransico Red OT/L, [address] [place], [place] [id number]    COVID-19 Screening completed upon entering facility and patient cleared for treatment today. Pt followed all protocols set forth by [address] [place] [address],[address] [place] [address] for Outpatient services. Patient has been made aware of all new hygiene protocols due to COVID-19 set forth by [address] [place] [address] Outpatient services and agrees to abide by all protocols.

## 2020-10-22 NOTE — PROGRESS NOTES
shoulder flexion, and scapular upward rotation  At times pt goes into thoracic extension to try and make up for lack of end range shoulder flexion     Treatment/Activity Tolerance:  [x] Patient tolerated treatment well [] Patient limited by fatique  [] Patient limited by pain  [] Patient limited by other medical complications  [] Other:     Prognosis: [x] Good [x] Fair  [] Poor    Patient Requires Follow-up: [x] Yes  [] No    Plan:   [x] Continue per plan of care [] Alter current plan (see comments)  [] Plan of care initiated [] Hold pending MD visit [] Discharge    Plan for Next Session:    Continue to work on R shoulder PROM/ AROM as tolerated       See Weekly Progress Note: []  Yes  [x]  No  Next due:          CPT codes 6/2/2020 Units    Low Complexity PT evaluation 21095 33873    Moderate Complexity PT evaluation  17637    High Complexity PT evaluation 34826    PT Re-evaluation  97230    Gait training 29147    Manual therapy  [phone] [id number]   Therapeutic activities  83708    Therapeutic exercises  29042 1   Neuromuscular reeducation  [phone]        Time In: 8039  Time Out: [facility]    Electronically signed by:    Vida Irene DPT  [id number]

## 2020-10-26 ENCOUNTER — HOSPITAL ENCOUNTER (OUTPATIENT)
Dept: OCCUPATIONAL THERAPY | Age: 65
Setting detail: THERAPIES SERIES
Discharge: HOME | End: 2020-10-26
Payer: MEDICARE

## 2020-10-26 ENCOUNTER — HOSPITAL ENCOUNTER (OUTPATIENT)
Dept: PHYSICAL THERAPY | Age: 65
Setting detail: THERAPIES SERIES
Discharge: HOME | End: 2020-10-26
Payer: MEDICARE

## 2020-10-26 PROCEDURE — 97140 MANUAL THERAPY 1/> REGIONS: CPT

## 2020-10-26 PROCEDURE — 97022 WHIRLPOOL THERAPY: CPT | Performed by: OCCUPATIONAL THERAPIST

## 2020-10-26 PROCEDURE — 97140 MANUAL THERAPY 1/> REGIONS: CPT | Performed by: OCCUPATIONAL THERAPIST

## 2020-10-26 PROCEDURE — 97110 THERAPEUTIC EXERCISES: CPT | Performed by: OCCUPATIONAL THERAPIST

## 2020-10-26 NOTE — PROGRESS NOTES
[address]  [phone]    Date:  10/26/2020     Initial Evaluation Date: 8/3/20                                  Evaluating Therapist: Adair Gil     Patient Name:  [facility])                   :  1955     Restrictions/Precautions:  none, low fall risk  Diagnosis:  Swelling R hand /CTR R                                                  Insurance/Certification information:  [employer], Medicare  Referring Practitioner:  Dr. Nicolasa Grande  Referring Practitioner specific orders: eval and treat. Edema control, etc, rom. New orders 20 cont hand program. Pt. to see PT. for shoulder. Date of Surgery/Injury: CTR 7/15/20  Plan of care signed (Y/N):  No  Visit# / total visits: +12=30  Reassess next visit     Pain Level: moderate, aching    Subjective: Pt. States\"It's so slow but index finger is doing pretty good now. Just have to get the other fingers going. \"     Objective:  Updated POC to be completed by 10th visit. Last reassessment   INTERVENTION: COMPLETED: SPECIFICS/COMMENTS:   Modality:     fluido x 15 min - To promote tissue healing, skin desensitization, reduce inflammation, and decrease pain. Actively completed SROM in all available planes with holds at end range during treatment       Paraffin  To wrist digits    AROM:     Wrist all planes  Increase wrist rom   Pendulums  2# wt swing all directions  Added to HEP   Bean grasp x 15 min   Tendon glides  15x2   Shoulder int/ext rotation stretches  15x2, for nerve gliding and tightness rotator cuff. Reduce overall pain shoulder/hand   Place and Holds x 12 reps in full fist, 8 reps in hook fist. Increased ROM noted. Fist Release and Place x Followed after place and hold, pt to open digits in extension and quickly place digits back into full flexion in attempts to achieve same motion with place and hold.     Fist Water Bead Richard [employer] Richard x Water bead ball placed in palm of hand, digits flexed and wrapped with co-band to increase flexion. Pt to complete pumps with focus on digging DIPs and PIPs into ball.  reps. Finger flex/ext     AAROM:     Shoulder flexion/ex x Wall pulley all directions  Added to HEP        PROM/Stretching:     Wrist /fingers as erin x 15 min Focus on full fist and hook fist. ^'d aggressivenses as tolerated. Putty Fist Rolls x 15 reps fist rolling in putty to loosen digits prior to PROM   Scar Mass/Edema Control:     Soft tissue mobilitzation x 10 min to increase tissue mobiltiy ,. Increase scar mobility        Strengthening:     3# wrist ext/flex x 15x2 increase wrist strength. digiflex red x 3 min, finger mobility and stengthening   Red flexbar x 15x2   Light Putty roll and pegs  10 min /improving  strength. UBE  10 min to increase circulation to help decrease swelling   clothespins  allcolors to increase pinch strength. Other:     fes  For finger flexion   kinesio taping  To reduce swelling and pain. Flexion glove /hep x Ongoing - To increase active finger flexion. Discussed flexion glove, pt feeling as though he is not getting enough pull in PIPs and DIPs. Asked pt to bring glove in for re-adjustments, possible splint fabrication to block MCPs. Dry needling  To calm central nervous system/ increase active motion. Hep/rice grasp, tendon glides, towel scrunches x Encouraged to perform 5 x daily to improve finger mobiltiy and reduce swelling and stiffness. Contrast baths/ rice heating pad  2 x daily for reduction of swelling. Improved mobility     REAssessment/Comments: Pt is making Good progress toward stated plan of care. Progressing with rom index and small fingers. Tightness cont's other digits, but near full flexion at the end of session. Less overall swelling and shinny skin is noted. Progressing with , strength and functional use of the right hand. Will reassess next session.    9/17/20  Pain level 2- 4-5  Active wrist ext/flex: 58/45  Active opposition increased to P4 level  Active  increased to 1/2 range/3/4 ext cont's   Edema Description/Circumferential Measurements: Mod swelling  Dynamometer (setting 2):                              Left: 82#                                               Right: 15#                     Pinch Meter:              Lateral: Left= 15# ,Right= 12#        Palmar 3 point: Left= 12#, Right= 12#  9 Hole Peg Test:              Left: 20 sec              Right: 31 sec     QuickDASH Score: (Scale 100% full disability, 0 no disability):  Decreased from initially 75 percent to 60%                        GOALS (Long term same as Short term):  1) Patient will demonstrate good understanding of home program (exercises/activities/diagnosis/prognosis/goals) with good accuracy. Goal progressing  2) Patient will demonstrate increased active/passive range of motion of their affected extremity/[facility] to [facility] for [facility]/IADL completion./goal progressing  3) Patient will demonstrate increased /pinch strength of at least 10 / 5 pinch pounds of their affected hand. Slow progress  4) Patient to report decreased pain in their affected hand/upper extremity from 2-5/10 to 0-2/10 or less with resistive functional use. [place] progress  5) Increase in fine motor function as evidenced by decreased time to complete 9-hole peg test and/or MRMT test by at least 30 seconds. Goal progressing  6) Patient will be knowledgeable of edema control techniques as evident with decreases from mod to none. No change   7) Patient will report ADL functions same as prior to diagnosis of swelling R hand, CTR/ goal progressing  8) Patient will demonstrate improved functional activity tolerance from assist/mod ind to ind for ADL/IADL completion. Goal progressing  9) Patient will decrease QuickDASH score by 40% for increased participation in daily functional activities.  Goal progressing          -Rehab Potential: Good    -Requires OT Follow Up: Yes  Time In: 10:00 am            Time Out: 11:00 am           Treatment Charges: Mins Units   Modalities/fluido 15 1   Ther Exercise 20 1   Manual Therapy 25 2   Thera Activities     ADL/Home Mgt      Neuro Re-education     Gait Training     Group Therapy     Non-Billable Service Time     Other     Total Time/Units 60 4     Plan:   [x]  Continues Plan of care: Treatment covered based on POC and graduated to patient's progress. Pt education continues at each visit to obtain maximum benefits from skilled OT intervention. []  Alter Plan of care:   []  Discharge:      Alma Hickman OT/L, [address] [place], [place] [id number]    COVID-19 Screening completed upon entering facility and patient cleared for treatment today. Pt followed all protocols set forth by [address] [place] [address],[facility] for Outpatient services. Patient has been made aware of all new hygiene protocols due to COVID-19 set forth by [address] [place] [address],[address] Outpatient services and agrees to abide by all protocols.

## 2020-10-26 NOTE — PROGRESS NOTES
[address] [facility]                Phone: [phone]   Fax: [phone]    Physical Therapy Daily Treatment Note  Date:  10/26/2020    Patient Name:  Princess Betancourt    :  1955  MRN: [id number]    Referring Physician:  Gloria Jonas  Insurance Information:  Jamel Moyer      Evaluation date:  20  Diagnosis:  R shoulder pain, adhesive capsulitis   PMH:  R shoulder rotator cuff repair on 20 by Dr. Alexa Brady, R carpal tunnel release    Evaluating Physical Therapist:  Julia Maya DPT   Plan of care signed (Y/N):    Visit# / total visits: -     Follow-up:  20 with Dr. Nadir Blunt     Subjective:   Pt reports 2/10 R shoulder soreness prior to session today. He states that his R shoulder was sore for about a day and a half following last PT session. Exercises:  (NA- see comments below for todays treatment)   Exercise/Equipment Reps  During/ after  Other comments    UBE (BUE) 5 min  Level #1     NT Cervical retractions  3x10  I, B  Improved ROM on retest with R and L rotation and retraction        - with extension   3x10  I, B           - with self overpressure                         NT Manual R shoulder extension stretch  3x10  I, B  Improved R shoulder active flexion on retest            Manual R shoulder PROM and stretching by PT  45 min  Flexion  Abduction   ER  Working on scapular mobs (upward rotation)  Shoulder flexion, abduction, IR, ER  Tack and stretch with scapula to improve tissue length and R shoulder ROM           NT Pulleys  4 min flexion   4 min abduction                    HEP Cervical retraction progression           Home Exercise Program:    Cervical retractions      Comments:  Hot pack to R shoulder prior to session today x8 min to increase tissue temperature prior to ROM and stretching exercises. No adverse skin reactions noted following removal of hot pack.     PROM/ manual stretching by PT x55 minutes today.  Working on increasing R shoulder flexion, abduction, ER, IR and flexion with ER. Scapular mobilizations in L sidelying to improve scapular elevation, depression, upward rotation, and protraction movements. Manual R shoulder extension stretch x10 reps    Pt tolerated session well. Movement continues to improve by end of PT sessions but pt reports that shoulder gradually feels like it stiffens back up again.       Add more HEP stretches next session today include:  Table slides (flexion, abduction, ER)  IR belt stretch behind back   Wall climbs       Treatment/Activity Tolerance:  [x] Patient tolerated treatment well [] Patient limited by fatique  [] Patient limited by pain  [] Patient limited by other medical complications  [] Other:     Prognosis: [x] Good [x] Fair  [] Poor    Patient Requires Follow-up: [x] Yes  [] No    Plan:   [x] Continue per plan of care [] Alter current plan (see comments)  [] Plan of care initiated [] Hold pending MD visit [] Discharge    Plan for Next Session:    Continue to work on R shoulder PROM/ AROM as tolerated   Advance HEP exercises next session       See Weekly Progress Note: []  Yes  [x]  No  Next due:          CPT codes 6/2/2020 Units    Low Complexity PT evaluation 72074 63735    Moderate Complexity PT evaluation  53673    High Complexity PT evaluation 89275    PT Re-evaluation  77220    Gait training 30725    Manual therapy  [phone]   Therapeutic activities  [phone]    Therapeutic exercises  [phone]    Neuromuscular reeducation  [id number]        Time In: 0900  Time Out: [address]    Electronically signed by:    Ely Cortes DPT  [id number]

## 2020-10-29 ENCOUNTER — HOSPITAL ENCOUNTER (OUTPATIENT)
Dept: OCCUPATIONAL THERAPY | Age: 65
Setting detail: THERAPIES SERIES
Discharge: HOME | End: 2020-10-29
Payer: MEDICARE

## 2020-10-29 ENCOUNTER — HOSPITAL ENCOUNTER (OUTPATIENT)
Dept: PHYSICAL THERAPY | Age: 65
Setting detail: THERAPIES SERIES
Discharge: HOME | End: 2020-10-29
Payer: MEDICARE

## 2020-10-29 PROCEDURE — 97110 THERAPEUTIC EXERCISES: CPT

## 2020-10-29 PROCEDURE — 97110 THERAPEUTIC EXERCISES: CPT | Performed by: OCCUPATIONAL THERAPIST

## 2020-10-29 PROCEDURE — 97140 MANUAL THERAPY 1/> REGIONS: CPT

## 2020-10-29 PROCEDURE — 97022 WHIRLPOOL THERAPY: CPT | Performed by: OCCUPATIONAL THERAPIST

## 2020-10-29 PROCEDURE — 97140 MANUAL THERAPY 1/> REGIONS: CPT | Performed by: OCCUPATIONAL THERAPIST

## 2020-10-29 NOTE — PROGRESS NOTES
[address]  [phone]    Date:  10/29/2020     Initial Evaluation Date: 8/3/20                                  Evaluating Therapist: Nona Abbott     Patient Name:  [facility])                   :  1955     Restrictions/Precautions:  none, low fall risk  Diagnosis:  Swelling R hand /CTR R                                                  Insurance/Certification information:  [employer], Medicare  Referring Practitioner:  Dr. Chaim Odom  Referring Practitioner specific orders: eval and treat. Edema control, etc, rom. New orders 20 cont hand program. Pt. to see PT. for shoulder. Date of Surgery/Injury: CTR 7/15/20  Plan of care signed (Y/N):  No  Visit# / total visits: +12=30  Reassess next visit     Pain Level: moderate, aching    Subjective: Pt. States\"I'm happy because my hand is finally moving better\"     Objective:  Updated POC to be completed by 10th visit. Last reassessment   INTERVENTION: COMPLETED: SPECIFICS/COMMENTS:   Modality:     fluido x 15 min - To promote tissue healing, skin desensitization, reduce inflammation, and decrease pain. Actively completed SROM in all available planes with holds at end range during treatment       Paraffin  To wrist digits    AROM:     Wrist all planes  Increase wrist rom   Pendulums  2# wt swing all directions  Added to HEP   Bean grasp x 15 min   Tendon glides  15x2   Shoulder int/ext rotation stretches  15x2, for nerve gliding and tightness rotator cuff. Reduce overall pain shoulder/hand   Place and Holds x 12 reps in full fist, 8 reps in hook fist. Increased ROM noted. Fist Release and Place x Followed after place and hold, pt to open digits in extension and quickly place digits back into full flexion in attempts to achieve same motion with place and hold. Fist Water Bead Richard [employer] Richard x Water bead ball placed in palm of hand, digits flexed and wrapped with co-band to increase flexion.  Pt to complete pumps with focus on digging DIPs and PIPs into ball.  reps. Finger flex/ext     AAROM:     Shoulder flexion/ex x Wall pulley all directions  Added to HEP        PROM/Stretching:     Wrist /fingers as erin x 15 min Focus on full fist and hook fist. ^'d aggressivenses as tolerated. Putty Fist Rolls x 15 reps fist rolling in putty to loosen digits prior to PROM   Scar Mass/Edema Control:     Soft tissue mobilitzation x 10 min to increase tissue mobiltiy ,. Increase scar mobility        Strengthening:     3# wrist ext/flex x 15x2 increase wrist strength. digiflex red x 3 min, finger mobility and stengthening   Red flexbar x 15x2   Light Putty roll and pegs  10 min /improving  strength. UBE  10 min to increase circulation to help decrease swelling   clothespins  allcolors to increase pinch strength. Other:     fes  For finger flexion   kinesio taping  To reduce swelling and pain. Flexion glove /hep x Ongoing - To increase active finger flexion. Discussed flexion glove, pt feeling as though he is not getting enough pull in PIPs and DIPs. Asked pt to bring glove in for re-adjustments, possible splint fabrication to block MCPs. Dry needling  To calm central nervous system/ increase active motion. Hep/rice grasp, tendon glides, towel scrunches x Encouraged to perform 5 x daily to improve finger mobiltiy and reduce swelling and stiffness. Contrast baths/ rice heating pad  2 x daily for reduction of swelling. Improved mobility     REAssessment/Comments: Pt is making Good progress toward stated plan of care. Progressing with , strength and functional use of the right hand. Less tightness today and improved active  at the start of treatment. 9/17/20  Pain level 2- 4-5  Active wrist ext/flex: 58/45  Active opposition increased to P4 level  Active  increased to 1/2 range/3/4 ext cont's   Edema Description/Circumferential Measurements:               Mod swelling  Dynamometer (setting 2):                              Left: 82#                                               Right: 15#                     Pinch Meter:              Lateral: Left= 15# ,Right= 12#        Palmar 3 point: Left= 12#, Right= 12#  9 Hole Peg Test:              Left: 20 sec              Right: 31 sec     QuickDASH Score: (Scale 100% full disability, 0 no disability):  Decreased from initially 75 percent to 60%                        GOALS (Long term same as Short term):  1) Patient will demonstrate good understanding of home program (exercises/activities/diagnosis/prognosis/goals) with good accuracy. Goal progressing  2) Patient will demonstrate increased active/passive range of motion of their affected extremity/hand to [facility] for ADL/IADL completion./goal progressing  3) Patient will demonstrate increased /pinch strength of at least 10 / 5 pinch pounds of their affected hand. Slow progress  4) Patient to report decreased pain in their affected hand/upper extremity from 2-5/10 to 0-2/10 or less with resistive functional use. Camacho Gail progress  5) Increase in fine motor function as evidenced by decreased time to complete 9-hole peg test and/or MRMT test by at least 30 seconds. Goal progressing  6) Patient will be knowledgeable of edema control techniques as evident with decreases from mod to none. No change   7) Patient will report ADL functions same as prior to diagnosis of swelling R hand, CTR/ goal progressing  8) Patient will demonstrate improved functional activity tolerance from assist/mod ind to ind for ADL/IADL completion. Goal progressing  9) Patient will decrease QuickDASH score by 40% for increased participation in daily functional activities.  Goal progressing          -Rehab Potential: Good    -Requires OT Follow Up: Yes  Time In: 10:00 am            Time Out: 11:00 am           Treatment Charges: Mins Units   Modalities/fluido 15 1   Ther Exercise 20 1   Manual Therapy 25 2   Thera Activities ADL/Home Mgt      Neuro Re-education     Gait Training     Group Therapy     Non-Billable Service Time     Other     Total Time/Units 60 4     Plan:   [x]  Continues Plan of care: Treatment covered based on POC and graduated to patient's progress. Pt education continues at each visit to obtain maximum benefits from skilled OT intervention. []  Alter Plan of care:   []  Discharge:      Vanna Houston OT/L, [address] [address]    COVID-19 Screening completed upon entering facility and patient cleared for treatment today. Pt followed all protocols set forth by [facility] for Outpatient services. Patient has been made aware of all new hygiene protocols due to COVID-19 set forth by [facility] Outpatient services and agrees to abide by all protocols.

## 2020-10-29 NOTE — PROGRESS NOTES
[address] [facility]                Phone: [phone]   Fax: [phone]    Physical Therapy Daily Treatment Note  Date:  10/29/2020    Patient Name:  Ginna Lazo    :  1955  MRN: [id number]    Referring Physician:  Maddison Roman  Insurance Information:  Kristy Roland      Evaluation date:  20  Diagnosis:  R shoulder pain, adhesive capsulitis   PMH:  R shoulder rotator cuff repair on 20 by Dr. Messi Cohen, R carpal tunnel release    Evaluating Physical Therapist:  Donovan Paredes DPT   Plan of care signed (Y/N):    Visit# / total visits: -     Follow-up:  20 with Dr. Kimberly Alegria     Subjective:   Pt reports mild R shoulder pain prior to session today. No other complaints. States that he feels like his R shoulder ROM is slowly showing some improvement since beginning PT.       Exercises:  (NA- see comments below for todays treatment)   Exercise/Equipment Reps  During/ after  Other comments   NT UBE (BUE) 5 min  Level #1     NT Cervical retractions  3x10  I, B  Improved ROM on retest with R and L rotation and retraction        - with extension   3x10  I, B           - with self overpressure                         NT Manual R shoulder extension stretch  3x10  I, B  Improved R shoulder active flexion on retest            Manual R shoulder PROM and stretching by PT  45 min  Flexion  Abduction   ER   IR Working on scapular mobs (upward rotation, retraction, elevation)  Shoulder flexion, abduction, IR, ER  Tack and stretch with scapula to improve tissue length and R shoulder ROM (lat/ pec stretches)           Pulleys  4 min flexion   4 min abduction                    HEP Cervical retraction progression           Home Exercise Program:    Cervical retractions      Comments:  Pt tolerated session well today  Improved R shoulder AROM prior to session today vs. Last session  Significant improvement in R shoulder ROM by end of session following manual stretching, contract/ relax stretching, and scapular mobilizations     Pt instructed in:  Table slides (flexion, ER) to add to HEP today  He verbalized and demonstrated understanding       Treatment/Activity Tolerance:  [x] Patient tolerated treatment well [] Patient limited by fatique  [] Patient limited by pain  [] Patient limited by other medical complications  [] Other:     Prognosis: [x] Good [x] Fair  [] Poor    Patient Requires Follow-up: [x] Yes  [] No    Plan:   [x] Continue per plan of care [] Alter current plan (see comments)  [] Plan of care initiated [] Hold pending MD visit [] Discharge    Plan for Next Session:    Continue to work on R shoulder PROM/ AROM as tolerated   Advance HEP exercises next session       See Weekly Progress Note: []  Yes  [x]  No  Next due:          CPT codes 6/2/2020 Units    Low Complexity PT evaluation 99460 35668    Moderate Complexity PT evaluation  65869    High Complexity PT evaluation 88208    PT Re-evaluation  77835    Gait training 49599    Manual therapy  [phone]   Therapeutic activities  00503    Therapeutic exercises  87693 1   Neuromuscular reeducation  [phone]        Time In: 7089  Time Out: [facility]    Electronically signed by:    Chrissy Hardwick DPT  [id number]

## 2020-11-02 ENCOUNTER — HOSPITAL ENCOUNTER (OUTPATIENT)
Dept: PHYSICAL THERAPY | Age: 65
Setting detail: THERAPIES SERIES
Discharge: HOME | End: 2020-11-02
Payer: MEDICARE

## 2020-11-02 ENCOUNTER — HOSPITAL ENCOUNTER (OUTPATIENT)
Dept: OCCUPATIONAL THERAPY | Age: 65
Setting detail: THERAPIES SERIES
Discharge: HOME | End: 2020-11-02
Payer: MEDICARE

## 2020-11-02 PROCEDURE — 97110 THERAPEUTIC EXERCISES: CPT | Performed by: OCCUPATIONAL THERAPIST

## 2020-11-02 PROCEDURE — 97140 MANUAL THERAPY 1/> REGIONS: CPT | Performed by: OCCUPATIONAL THERAPIST

## 2020-11-02 PROCEDURE — 97022 WHIRLPOOL THERAPY: CPT | Performed by: OCCUPATIONAL THERAPIST

## 2020-11-02 PROCEDURE — 97110 THERAPEUTIC EXERCISES: CPT

## 2020-11-02 PROCEDURE — 97140 MANUAL THERAPY 1/> REGIONS: CPT

## 2020-11-02 NOTE — PROGRESS NOTES
[facility]  [phone]    Date:  2020     Initial Evaluation Date: 8/3/20                                  Evaluating Therapist: Cathy Ordoñez     [facility]                   :  1955     Restrictions/Precautions:  none, low fall risk  Diagnosis:  Swelling R hand /CTR R                                                  Insurance/Certification information:  [employer], Medicare  Referring Practitioner:  Dr. Love Cool  Referring Practitioner specific orders: eval and treat. Edema control, etc, rom. New orders 20 cont hand program. Pt. to see PT. for shoulder. Date of Surgery/Injury: CTR 7/15/20  Plan of care signed (Y/N):  No  Visit# / total visits: +12=30      Pain Level: moderate, aching    Subjective: Pt. States, my hand is slowly getting better\"\"     Objective:  Updated POC to be completed by 10th visit. Last reassessment 20  INTERVENTION: COMPLETED: SPECIFICS/COMMENTS:   Modality:     fluido x 15 min - To promote tissue healing, skin desensitization, reduce inflammation, and decrease pain. Actively completed SROM in all available planes with holds at end range during treatment       Paraffin  To wrist digits    AROM:     Wrist all planes  Increase wrist rom   Pendulums  2# wt swing all directions  Added to HEP   Bean grasp x 15 min   Tendon glides  15x2   Shoulder int/ext rotation stretches  15x2, for nerve gliding and tightness rotator cuff. Reduce overall pain shoulder/hand   Place and Holds x 12 reps in full fist, 8 reps in hook fist. Increased ROM noted. Fist Release and Place x Followed after place and hold, pt to open digits in extension and quickly place digits back into full flexion in attempts to achieve same motion with place and hold. Fist Water Bead Richard [employer] Richard x Water bead ball placed in palm of hand, digits flexed and wrapped with co-band to increase flexion.  Pt to complete pumps with focus on digging DIPs and PIPs into ball.  reps. Finger flex/ext     AAROM:     Shoulder flexion/ex x Wall pulley all directions  Added to HEP        PROM/Stretching:     Wrist /fingers as erin x 15 min Focus on full fist and hook fist. ^'d aggressivenses as tolerated. Putty Fist Rolls x 15 reps fist rolling in putty to loosen digits prior to PROM   Scar Mass/Edema Control:     Soft tissue mobilitzation x 10 min to increase tissue mobiltiy ,. Increase scar mobility        Strengthening:     3# wrist ext/flex x 15x2 increase wrist strength. digiflex red x 3 min, finger mobility and stengthening   Red flexbar x 15x2   Light Putty roll and pegs  10 min /improving  strength. UBE  10 min to increase circulation to help decrease swelling   clothespins  allcolors to increase pinch strength. Other:     fes  For finger flexion   kinesio taping  To reduce swelling and pain. Flexion glove /hep x Ongoing - To increase active finger flexion. Discussed flexion glove, pt feeling as though he is not getting enough pull in PIPs and DIPs. Asked pt to bring glove in for re-adjustments, possible splint fabrication to block MCPs. Dry needling  To calm central nervous system/ increase active motion. Hep/rice grasp, tendon glides, towel scrunches x Encouraged to perform 5 x daily to improve finger mobiltiy and reduce swelling and stiffness. Contrast baths/ rice heating pad  2 x daily for reduction of swelling. Improved mobility     REAssessment/Comments: Pt is making Good progress toward stated plan of care. Progressing with , strength and functional use of the right hand.     9/17/20 11/2/20  Pain level 2- 4-5                                                                        1-3  Active wrist ext/flex: 58/45                                                       58/45  Active opposition increased to P4 level No change to P4 level only  Active  increased to 1/2 range/3/4 ext cont's                 Increased to 2/3-3/4 range   Edema Description/Circumferential Measurements: Mod swelling                                                                    Mod-mild swelling  Dynamometer (setting 2):                              Left: 82#                                                                           85#              Right: 15#                                                                         25#  Pinch Meter:              Lateral: Left= 15# ,Right= 12#                                       R/ 13#              Palmar 3 point: Left= 12#, Right= 12#                            R/ 14#  9 Hole Peg Test:              Left: 20 sec              Right: 31 sec                                                                   28 sec WNL     QuickDASH Score: (Scale 100% full disability, 0 no disability):  Decreased from initially 75 percent to 40%                       GOALS (Long term same as Short term):  1) Patient will demonstrate good understanding of home program (exercises/activities/diagnosis/prognosis/goals) with good accuracy. Goal progressing  2) Patient will demonstrate increased active/passive range of motion of their affected extremity/hand to [facility] for ADL/IADL completion./goal progressing  3) Patient will demonstrate increased /pinch strength of at least 10 / 5 pinch pounds of their affected hand. Slow progress  4) Patient to report decreased pain in their affected hand/upper extremity from 2-5/10 to 0-2/10 or less with resistive functional use. Sigrid Bays progress  5) Increase in fine motor function as evidenced by decreased time to complete 9-hole peg test and/or MRMT test by at least 30 seconds. Goal progressing  6) Patient will be knowledgeable of edema control techniques as evident with decreases from mod to none.  No change   7) Patient will report ADL functions same as prior to diagnosis of swelling R hand, CTR/ goal progressing  8) Patient will demonstrate improved functional activity tolerance from assist/mod ind to ind for ADL/IADL completion. Goal progressing  9) Patient will decrease QuickDASH score by 40% for increased participation in daily functional activities. Goal progressing          -Rehab Potential: Good    -Requires OT Follow Up: Yes  Time In: 10:00 am            Time Out: 11:00 am           Treatment Charges: Mins Units   Modalities/fluido 15 1   Ther Exercise 20 1   Manual Therapy 25 2   Thera Activities     ADL/Home Mgt      Neuro Re-education     Gait Training     Group Therapy     Non-Billable Service Time     Other     Total Time/Units 60 4     Plan:   [x]  Continues Plan of care: Treatment covered based on POC and graduated to patient's progress. Pt education continues at each visit to obtain maximum benefits from skilled OT intervention. []  Alter Plan of care:   []  Discharge:      Mark Munroe OT/, [place] 038189    COVID-19 Screening completed upon entering facility and patient cleared for treatment today. Pt followed all protocols set forth by [address] [place] [address],[facility] [facility] for Outpatient services. Patient has been made aware of all new hygiene protocols due to COVID-19 set forth by [address] [place] [address],[address] Outpatient services and agrees to abide by all protocols.

## 2020-11-02 NOTE — PROGRESS NOTES
[facility]                Phone: [phone]   Fax: [phone]    Physical Therapy Daily Treatment Note  Date:  2020    Patient Name:  Princess Betancourt    :  1955  MRN: [id number]    Referring Physician:  Gloria Jonas  Insurance Information:  Jamel Moyer      Evaluation date:  20  Diagnosis:  R shoulder pain, adhesive capsulitis   PMH:  R shoulder rotator cuff repair on 20 by Dr. Alexa Brady, R carpal tunnel release    Evaluating Physical Therapist:  Julia Maya DPT   Plan of care signed (Y/N):    Visit# / total visits: 10 /12-     Follow-up:  20 with Dr. Odalys Chakraborty:   Pt reports that his shoulder was a little sore from doing pulleys at home over the weekend. He reports that he is back to his baseline level of pain and soreness to R shoulder prior to session today.        Exercises:     Exercise/Equipment Reps  During/ after  Other comments   NT UBE (BUE) 5 min  Level #1     NT Cervical retractions  3x10  I, B  Improved ROM on retest with R and L rotation and retraction        - with extension   3x10  I, B           - with self overpressure                         NT Manual R shoulder extension stretch  3x10  I, B  Improved R shoulder active flexion on retest            Manual R shoulder PROM and stretching by PT  20 min  Flexion  Abduction   ER   IR   Shoulder flexion, abduction, IR, ER  Tack and stretch with scapula/ axilla/ pecs to improve tissue length and R shoulder ROM (lat/ pec stretches)    Table slides  Flexion 15  Abduction 15  ER 15       Pulleys  5 min flexion   5 min abduction       Wall ladder  5x5 sec hold at top with slight lean forward             HEP Cervical retraction progression           Home Exercise Program:    Cervical retractions   Pulleys for shoulder PROM   Table slides (flexion, abduction, ER)     Comments:  Pt tolerated session well today  Improved R shoulder AROM by end of session with grossly 170 ° flexion with elbow straight and humerus next to head   Overall, pt continues to have improve ROM by end of each session but reports that he \"stiffens up\" between sessions   He does report that overall, he feels like his ROM is gradually improving since beginning PT      Treatment/Activity Tolerance:  [x] Patient tolerated treatment well [] Patient limited by fatique  [] Patient limited by pain  [] Patient limited by other medical complications  [] Other:     Prognosis: [x] Good [x] Fair  [] Poor    Patient Requires Follow-up: [x] Yes  [] No    Plan:   [x] Continue per plan of care [] Alter current plan (see comments)  [] Plan of care initiated [] Hold pending MD visit [] Discharge    Plan for Next Session:    Continue to work on R shoulder PROM/ AROM as tolerated   Advance HEP exercises next session       See Weekly Progress Note: []  Yes  [x]  No  Next due:          CPT codes 6/2/2020 Units    Low Complexity PT evaluation 87566 34899    Moderate Complexity PT evaluation  68688    High Complexity PT evaluation 27843    PT Re-evaluation  23952    Gait training 10819    Manual therapy  69348 2   Therapeutic activities  06068    Therapeutic exercises  36766 2   Neuromuscular reeducation  [id number]        Time In: 3783  Time Out: [address]    Electronically signed by:    Benjamín Duong DPT  [id number]

## 2020-11-05 ENCOUNTER — HOSPITAL ENCOUNTER (OUTPATIENT)
Dept: PHYSICAL THERAPY | Age: 65
Setting detail: THERAPIES SERIES
Discharge: HOME | End: 2020-11-05
Payer: MEDICARE

## 2020-11-05 ENCOUNTER — HOSPITAL ENCOUNTER (OUTPATIENT)
Dept: OCCUPATIONAL THERAPY | Age: 65
Setting detail: THERAPIES SERIES
Discharge: HOME | End: 2020-11-05
Payer: MEDICARE

## 2020-11-05 PROCEDURE — 97140 MANUAL THERAPY 1/> REGIONS: CPT

## 2020-11-05 PROCEDURE — 97022 WHIRLPOOL THERAPY: CPT | Performed by: OCCUPATIONAL THERAPIST

## 2020-11-05 PROCEDURE — 97110 THERAPEUTIC EXERCISES: CPT

## 2020-11-05 PROCEDURE — 97140 MANUAL THERAPY 1/> REGIONS: CPT | Performed by: OCCUPATIONAL THERAPIST

## 2020-11-05 PROCEDURE — 97110 THERAPEUTIC EXERCISES: CPT | Performed by: OCCUPATIONAL THERAPIST

## 2020-11-05 NOTE — PROGRESS NOTES
[facility]                Phone: [phone]   Fax: [phone]    Physical Therapy Daily Treatment Note  Date:  2020    Patient Name:  Gilberto Finch    :  1955  MRN: [id number]    Referring Physician:  Juliene Olszewski  Insurance Information:  [employer]ette      Evaluation date:  20  Diagnosis:  R shoulder pain, adhesive capsulitis   PMH:  R shoulder rotator cuff repair on 20 by Dr. Patel Lopez, R carpal tunnel release    Evaluating Physical Therapist:  Gladys Mendoza DPT   Plan of care signed (Y/N):    Visit# / total visits: -     Follow-up:  20 with Dr. Cecil Cummings     Subjective:   Pt reports that his shoulder is a little sore today. He rates soreness as 2/10 in R shoulder. Still having R side neck stiffness after performing pulleys at home.       Exercises:     Exercise/Equipment Reps  During/ after  Other comments   NT UBE (BUE) 5 min  Level #1     NT Cervical retractions  3x10  Sitting          - with extension   3x10- NT  I, B           - with self overpressure  NT           - with PT overpressure  3x10  I, B  Pt with improved R and L cervical rotation AROM on retest          - supine retraction with PT overpressure  4x10  I, B  Pt with improved R and L cervical rotation AROM on retest     Manual R shoulder extension stretch  10x Supine              Manual R shoulder PROM and stretching by PT  30min  Flexion  Abduction   ER   IR   Shoulder flexion, abduction, IR, ER  Tack and stretch with scapula/ axilla/ pecs to improve tissue length and R shoulder ROM (lat/ pec stretches)   NT Table slides  Flexion 15  Abduction 15  ER 15      NT Pulleys  5 min flexion   5 min abduction      NT Wall ladder  5x5 sec hold at top with slight lean forward             HEP Cervical retraction progression           Home Exercise Program:    Cervical retractions (added self over pressure on 20)  Pulleys for shoulder PROM   Table slides (flexion, abduction, ER)     Comments:  Pt tolerated session well today  Improved R and L cervical rotation following repeated cervical retraction and retraction with PT overpressure   Decreased R sided cervical pain as well  Pt with increased R shoulder flexion AROM and decreased pain following manual R shoulder stretching in supine     Pt continues to show improvement in R shoulder ROM with R shoulder stretching as tolerated as well as with cervical retraction   Pt verbalized understanding to continue with R shoulder ROM exercises at home as well as adding self overpressure with repeated cervical retractions     Pt progressing well       Treatment/Activity Tolerance:  [x] Patient tolerated treatment well [] Patient limited by fatique  [] Patient limited by pain  [] Patient limited by other medical complications  [] Other:     Prognosis: [x] Good [x] Fair  [] Poor    Patient Requires Follow-up: [x] Yes  [] No    Plan:   [x] Continue per plan of care [] Alter current plan (see comments)  [] Plan of care initiated [] Hold pending MD visit [] Discharge    Plan for Next Session:    Continue to work on R shoulder PROM/ AROM as tolerated   Add cervical retraction with extension and PT overpressure next session        See Weekly Progress Note: []  Yes  [x]  No  Next due:          CPT codes 6/2/2020 Units    Low Complexity PT evaluation 02545 40124    Moderate Complexity PT evaluation  00966    High Complexity PT evaluation 29427    PT Re-evaluation  04089    Gait training 78028    Manual therapy  35639 3   Therapeutic activities  51317    Therapeutic exercises  01175 1   Neuromuscular reeducation  [phone]        Time In: 0759  Time Out: 1005    Electronically signed by:    Karyle Spikes, DPT  [id number]

## 2020-11-05 NOTE — PROGRESS NOTES
Ramesh  [phone]    Date:  2020     Initial Evaluation Date: 8/3/20                                  Evaluating Therapist: Vanna Houston     Patient Name:  [facility])                   :  1955     Restrictions/Precautions:  none, low fall risk  Diagnosis:  Swelling R hand /CTR R                                                  Insurance/Certification information:  [facility], Medicare  Referring Practitioner:  Dr. Mechelle Sosa  Referring Practitioner specific orders: eval and treat. Edema control, etc, rom. New orders 20 cont hand program. Pt. to see PT. for shoulder. Date of Surgery/Injury: CTR 7/15/20  Plan of care signed (Y/N):  No  Visit# / total visits: +12=30      Pain Level: moderate, aching    Subjective: Pt. States, \"overall better but still wakes me in the night in pain. \"     Objective:  Updated POC to be completed by 10th visit. Last reassessment 20  INTERVENTION: COMPLETED: SPECIFICS/COMMENTS:   Modality:     fluido x 15 min - To promote tissue healing, skin desensitization, reduce inflammation, and decrease pain. Actively completed SROM in all available planes with holds at end range during treatment       Paraffin  To wrist digits    AROM:     Wrist all planes  Increase wrist rom   Pendulums  2# wt swing all directions  Added to HEP   Bean grasp x 15 min   Tendon glides  15x2   Shoulder int/ext rotation stretches  15x2, for nerve gliding and tightness rotator cuff. Reduce overall pain shoulder/hand   Place and Holds x 12 reps in full fist, 8 reps in hook fist. Increased ROM noted. Fist Release and Place x Followed after place and hold, pt to open digits in extension and quickly place digits back into full flexion in attempts to achieve same motion with place and hold. Fist Water Bead Richard & Richard x Water bead ball placed in palm of hand, digits flexed and wrapped with co-band to increase flexion.  Pt to complete pumps with focus on digging DIPs and PIPs into ball.  reps. Finger flex/ext     AAROM:     Shoulder flexion/ex x Wall pulley all directions  Added to HEP        PROM/Stretching:     Wrist /fingers as erin x 15 min Focus on full fist and hook fist. ^'d aggressivenses as tolerated. Putty Fist Rolls x 15 reps fist rolling in putty to loosen digits prior to PROM   Scar Mass/Edema Control:     Soft tissue mobilitzation x 10 min to increase tissue mobiltiy ,. Increase scar mobility        Strengthening:     3# wrist ext/flex x 15x2 increase wrist strength. digiflex red x 3 min, finger mobility and stengthening   Red flexbar x 15x2   Light Putty roll and pegs  10 min /improving  strength. UBE  10 min to increase circulation to help decrease swelling   clothespins  allcolors to increase pinch strength. Other:     fes  For finger flexion   kinesio taping  To reduce swelling and pain. Flexion glove /hep x Ongoing - To increase active finger flexion. Discussed flexion glove, pt feeling as though he is not getting enough pull in PIPs and DIPs. Asked pt to bring glove in for re-adjustments, possible splint fabrication to block MCPs. Dry needling  To calm central nervous system/ increase active motion. Hep/rice grasp, tendon glides, towel scrunches x Encouraged to perform 5 x daily to improve finger mobiltiy and reduce swelling and stiffness. Contrast baths/ rice heating pad  2 x daily for reduction of swelling. Improved mobility     REAssessment/Comments: Pt is making Good progress toward stated plan of care. Progressing with , strength and functional use of the right hand.     9/17/20 11/2/20  Pain level 2- 4-5                                                                        1-3  Active wrist ext/flex: 58/45                                                       58/45  Active opposition increased to P4 level                                No change to P4 level only  Active  increased to 1/2 range/3/4 ext cont's                 Increased to 2/3-3/4 range   Edema Description/Circumferential Measurements: Mod swelling                                                                    Mod-mild swelling  Dynamometer (setting 2):                              Left: 82#                                                                           85#              Right: 15#                                                                         25#  Pinch Meter:              Lateral: Left= 15# ,Right= 12#                                       R/ 13#              Palmar 3 point: Left= 12#, Right= 12#                            R/ 14#  9 Hole Peg Test:              Left: 20 sec              Right: 31 sec                                                                   28 sec WNL     QuickDASH Score: (Scale 100% full disability, 0 no disability):  Decreased from initially 75 percent to 40%                       GOALS (Long term same as Short term):  1) Patient will demonstrate good understanding of home program (exercises/activities/diagnosis/prognosis/goals) with good accuracy. Goal progressing  2) Patient will demonstrate increased active/passive range of motion of their affected extremity/hand to [facility] for ADL/IADL completion./goal progressing  3) Patient will demonstrate increased /pinch strength of at least 10 / 5 pinch pounds of their affected hand. Slow progress  4) Patient to report decreased pain in their affected hand/upper extremity from 2-5/10 to 0-2/10 or less with resistive functional use. Klever Fillers progress  5) Increase in fine motor function as evidenced by decreased time to complete 9-hole peg test and/or MRMT test by at least 30 seconds. Goal progressing  6) Patient will be knowledgeable of edema control techniques as evident with decreases from mod to none.  No change   7) Patient will report ADL functions same as prior to diagnosis of swelling R hand, CTR/ goal progressing  8) Patient will demonstrate improved functional activity tolerance from assist/mod ind to ind for ADL/IADL completion. Goal progressing  9) Patient will decrease QuickDASH score by 40% for increased participation in daily functional activities. Goal progressing          -Rehab Potential: Good    -Requires OT Follow Up: Yes  Time In: 10:00 am            Time Out: 11:00 am           Treatment Charges: Mins Units   Modalities/fluido 15 1   Ther Exercise 20 1   Manual Therapy 25 2   Thera Activities     ADL/Home Mgt      Neuro Re-education     Gait Training     Group Therapy     Non-Billable Service Time     Other     Total Time/Units 60 4     Plan:   [x]  Continues Plan of care: Treatment covered based on POC and graduated to patient's progress. Pt education continues at each visit to obtain maximum benefits from skilled OT intervention. []  Alter Plan of care:   []  Discharge:      Olivia Marks OT/L, [address] [place], [place] [id number]    COVID-19 Screening completed upon entering facility and patient cleared for treatment today. Pt followed all protocols set forth by [facility] for Outpatient services. Patient has been made aware of all new hygiene protocols due to COVID-19 set forth by [facility] Outpatient services and agrees to abide by all protocols.

## 2020-11-09 ENCOUNTER — HOSPITAL ENCOUNTER (OUTPATIENT)
Dept: OCCUPATIONAL THERAPY | Age: 65
Setting detail: THERAPIES SERIES
Discharge: HOME | End: 2020-11-09
Payer: MEDICARE

## 2020-11-09 ENCOUNTER — HOSPITAL ENCOUNTER (OUTPATIENT)
Dept: PHYSICAL THERAPY | Age: 65
Setting detail: THERAPIES SERIES
Discharge: HOME | End: 2020-11-09
Payer: MEDICARE

## 2020-11-09 PROCEDURE — 97022 WHIRLPOOL THERAPY: CPT | Performed by: OCCUPATIONAL THERAPIST

## 2020-11-09 PROCEDURE — 97110 THERAPEUTIC EXERCISES: CPT | Performed by: OCCUPATIONAL THERAPIST

## 2020-11-09 PROCEDURE — 97110 THERAPEUTIC EXERCISES: CPT

## 2020-11-09 PROCEDURE — 97140 MANUAL THERAPY 1/> REGIONS: CPT | Performed by: OCCUPATIONAL THERAPIST

## 2020-11-09 PROCEDURE — 97140 MANUAL THERAPY 1/> REGIONS: CPT

## 2020-11-09 NOTE — PROGRESS NOTES
[facility]  [phone]    Date:  2020     Initial Evaluation Date: 8/3/20                                  Evaluating Therapist: Francisco Javier León     Patient Name:  [facility])                   :  1955     Restrictions/Precautions:  none, low fall risk  Diagnosis:  Swelling R hand /CTR R                                                  Insurance/Certification information:  [employer], Medicare  Referring Practitioner:  Dr. Teto Flood  Referring Practitioner specific orders: eval and treat. Edema control, etc, rom. New orders 20 cont hand program. Pt. to see PT. for shoulder. Date of Surgery/Injury: CTR 7/15/20  Plan of care signed (Y/N):  No  Visit# / total visits: +12=30      Pain Level: moderate, aching    Subjective: Pt. States, \"some what of a break through lately with better hand closure. \"     Objective:  Updated POC to be completed by 10th visit. Last reassessment 20  INTERVENTION: COMPLETED: SPECIFICS/COMMENTS:   Modality:     fluido x 15 min - To promote tissue healing, skin desensitization, reduce inflammation, and decrease pain. Actively completed SROM in all available planes with holds at end range during treatment       Paraffin  To wrist digits    AROM:     Wrist all planes  Increase wrist rom   Pendulums  2# wt swing all directions  Added to HEP   Bean grasp x 15 min   Tendon glides  15x2   Shoulder int/ext rotation stretches  15x2, for nerve gliding and tightness rotator cuff. Reduce overall pain shoulder/hand   Place and Holds x 12 reps in full fist, 8 reps in hook fist. Increased ROM noted. Fist Release and Place x Followed after place and hold, pt to open digits in extension and quickly place digits back into full flexion in attempts to achieve same motion with place and hold. Fist Water Bead Richard & Richard x Water bead ball placed in palm of hand, digits flexed and wrapped with co-band to increase flexion. Pt to complete pumps with focus on digging DIPs and PIPs into ball.  reps. Finger flex/ext     AAROM:     Shoulder flexion/ex x Wall pulley all directions  Added to HEP        PROM/Stretching:     Wrist /fingers as erin x 15 min Focus on full fist and hook fist. ^'d aggressivenses as tolerated. Putty Fist Rolls x 15 reps fist rolling in putty to loosen digits prior to PROM   Scar Mass/Edema Control:     Soft tissue mobilitzation x 10 min to increase tissue mobiltiy ,. Increase scar mobility        Strengthening:     3# wrist ext/flex x 15x2 increase wrist strength. digiflex red x 3 min, finger mobility and stengthening   Red flexbar x 15x2   Light Putty roll and pegs  10 min /improving  strength. UBE  10 min to increase circulation to help decrease swelling   clothespins  allcolors to increase pinch strength. Other:     fes  For finger flexion   kinesio taping  To reduce swelling and pain. Flexion glove /hep x Ongoing - To increase active finger flexion. Discussed flexion glove, pt feeling as though he is not getting enough pull in PIPs and DIPs. Asked pt to bring glove in for re-adjustments, possible splint fabrication to block MCPs. Dry needling  To calm central nervous system/ increase active motion. Hep/rice grasp, tendon glides, towel scrunches x Encouraged to perform 5 x daily to improve finger mobiltiy and reduce swelling and stiffness. Contrast baths/ rice heating pad  2 x daily for reduction of swelling. Improved mobility     REAssessment/Comments: Pt is making Good progress toward stated plan of care. Progressing with , strength and functional use of the right hand.     9/17/20 11/2/20  Pain level 2- 4-5                                                                        1-3  Active wrist ext/flex: 58/45                                                       58/45  Active opposition increased to P4 level                                No change to P4 level only  Active  increased to 1/2 range/3/4 ext cont's                 Increased to 2/3-3/4 range   Edema Description/Circumferential Measurements: Mod swelling                                                                    Mod-mild swelling  Dynamometer (setting 2):                              Left: 82#                                                                           85#              Right: 15#                                                                         25#  Pinch Meter:              Lateral: Left= 15# ,Right= 12#                                       R/ 13#              Palmar 3 point: Left= 12#, Right= 12#                            R/ 14#  9 Hole Peg Test:              Left: 20 sec              Right: 31 sec                                                                   28 sec WNL     QuickDASH Score: (Scale 100% full disability, 0 no disability):  Decreased from initially 75 percent to 40%                       GOALS (Long term same as Short term):  1) Patient will demonstrate good understanding of home program (exercises/activities/diagnosis/prognosis/goals) with good accuracy. Goal progressing  2) Patient will demonstrate increased active/passive range of motion of their affected extremity/hand to [facility] for ADL/IADL completion./goal progressing  3) Patient will demonstrate increased /pinch strength of at least 10 / 5 pinch pounds of their affected hand. Slow progress  4) Patient to report decreased pain in their affected hand/upper extremity from 2-5/10 to 0-2/10 or less with resistive functional use. Manuel Rival progress  5) Increase in fine motor function as evidenced by decreased time to complete 9-hole peg test and/or MRMT test by at least 30 seconds. Goal progressing  6) Patient will be knowledgeable of edema control techniques as evident with decreases from mod to none.  No change   7) Patient will report ADL functions same as prior to diagnosis of swelling R hand, CTR/ goal progressing  8) Patient will demonstrate improved functional activity tolerance from assist/mod ind to ind for ADL/IADL completion. Goal progressing  9) Patient will decrease QuickDASH score by 40% for increased participation in daily functional activities. Goal progressing          -Rehab Potential: Good    -Requires OT Follow Up: Yes  Time In: 10:00 am            Time Out: 11:00 am           Treatment Charges: Mins Units   Modalities/fluido 15 1   Ther Exercise 20 1   Manual Therapy 25 2   Thera Activities     ADL/Home Mgt      Neuro Re-education     Gait Training     Group Therapy     Non-Billable Service Time     Other     Total Time/Units 60 4     Plan:   [x]  Continues Plan of care: Treatment covered based on POC and graduated to patient's progress. Pt education continues at each visit to obtain maximum benefits from skilled OT intervention. []  Alter Plan of care:   []  Discharge:      Adair Gil OT/L, [address] [place] [address]    COVID-19 Screening completed upon entering facility and patient cleared for treatment today. Pt followed all protocols set forth by [address] [place] [address],[address] for Outpatient services. Patient has been made aware of all new hygiene protocols due to COVID-19 set forth by [address] [place] [address],[address] Outpatient services and agrees to abide by all protocols.

## 2020-11-09 NOTE — PROGRESS NOTES
[address] [facility]                Phone: [phone]   Fax: [phone]    Physical Therapy Daily Treatment Note  Date:  2020    Patient Name:  Katelyn Remy    :  1955  MRN: [id number]    Referring Physician:  Reuben Mahan  Insurance Information:  Vaibhav Antoine      Evaluation date:  20  Diagnosis:  R shoulder pain, adhesive capsulitis   PMH:  R shoulder rotator cuff repair on 20 by Dr. Ben Horan, R carpal tunnel release    Evaluating Physical Therapist:  Karyle Spikes, DPT   Plan of care signed (Y/N):    Visit# / total visits: -     Follow-up:  20 with Dr. Maryam De Leon:   Pt reports that he has been performing cervical retractions only 1x a day due to being busy. He does report performing pulleys and table stretches multiple times a day for R shoulder.        Exercises:     Exercise/Equipment Reps  During/ after  Other comments   NT UBE (BUE) 5 min  Level #1      Cervical retractions               - with self overpressure  3x10            - supine retraction with PT overpressure  2x10   1x5 with extension  I, B  Pt with improved R and L cervical rotation AROM on retest    NT Manual R shoulder extension stretch  10x Supine       IR belt stretch  8x5 sec   Increased AROM R shoulder IR on retest (L back pocket)    Manual R shoulder PROM and stretching by PT  30min  Flexion  Abduction   ER   IR   Shoulder flexion, abduction, IR, ER  Tack and stretch with scapula/ axilla/ pecs to improve tissue length and R shoulder ROM (lat/ pec stretches)   NT Table slides  Flexion 15  Abduction 15  ER 15      NT Pulleys  5 min flexion   5 min abduction      NT Wall ladder  5x5 sec hold at top with slight lean forward             HEP Cervical retraction progression           Home Exercise Program:    Cervical retractions (added self over pressure on 20)  Pulleys for shoulder PROM   Table slides (flexion, abduction, ER) Comments:  Pt tolerated session well today  Educated on need to perform 3x10 every 3 hours cervical retractions for HEP.   Pt verbalized and demonstrated understanding   Pt continues to have improved R shoulder AROM on retest by end of sessions  Less tissue resistance and stiffness noted with manual stretching compared to previous sessions  Improved scapular mobility noted today with mobilizations      Pt progressing well       Treatment/Activity Tolerance:  [x] Patient tolerated treatment well [] Patient limited by fatique  [] Patient limited by pain  [] Patient limited by other medical complications  [] Other:     Prognosis: [x] Good [x] Fair  [] Poor    Patient Requires Follow-up: [x] Yes  [] No    Plan:   [x] Continue per plan of care [] Alter current plan (see comments)  [] Plan of care initiated [] Hold pending MD visit [] Discharge    Plan for Next Session:    Continue to work on R shoulder PROM/ AROM as tolerated   Continue to work more on R shoulder IR stretching with belt to improve ROM        See Weekly Progress Note: []  Yes  [x]  No  Next due:          CPT codes 6/2/2020 Units    Low Complexity PT evaluation 20096 28745    Moderate Complexity PT evaluation  20590    High Complexity PT evaluation 00995    PT Re-evaluation  24496    Gait training 38128    Manual therapy  32778 2   Therapeutic activities  83281    Therapeutic exercises  39935 2   Neuromuscular reeducation  [id number]        Time In: 0900  Time Out: [address]    Electronically signed by:    Idalia Seo DPT  [id number]

## 2020-11-12 ENCOUNTER — HOSPITAL ENCOUNTER (OUTPATIENT)
Dept: PHYSICAL THERAPY | Age: 65
Setting detail: THERAPIES SERIES
Discharge: HOME | End: 2020-11-12
Payer: MEDICARE

## 2020-11-12 ENCOUNTER — HOSPITAL ENCOUNTER (OUTPATIENT)
Dept: OCCUPATIONAL THERAPY | Age: 65
Setting detail: THERAPIES SERIES
Discharge: HOME | End: 2020-11-12
Payer: MEDICARE

## 2020-11-12 PROCEDURE — 97110 THERAPEUTIC EXERCISES: CPT | Performed by: OCCUPATIONAL THERAPIST

## 2020-11-12 PROCEDURE — 97022 WHIRLPOOL THERAPY: CPT | Performed by: OCCUPATIONAL THERAPIST

## 2020-11-12 PROCEDURE — 97140 MANUAL THERAPY 1/> REGIONS: CPT | Performed by: OCCUPATIONAL THERAPIST

## 2020-11-12 PROCEDURE — 97140 MANUAL THERAPY 1/> REGIONS: CPT

## 2020-11-12 NOTE — PROGRESS NOTES
complete pumps with focus on digging DIPs and PIPs into ball.  reps. Finger flex/ext     AAROM:     Shoulder flexion/ex x Wall pulley all directions  Added to HEP        PROM/Stretching:     Wrist /fingers as erin x 15 min Focus on full fist and hook fist. ^'d aggressivenses as tolerated. Putty Fist Rolls x 15 reps fist rolling in putty to loosen digits prior to PROM   Scar Mass/Edema Control:     Soft tissue mobilitzation x 10 min to increase tissue mobiltiy ,. Increase scar mobility        Strengthening:     3# wrist ext/flex x 15x2 increase wrist strength. digiflex red x 3 min, finger mobility and stengthening   Red flexbar x 15x2   Light Putty roll and pegs  10 min /improving  strength. UBE  10 min to increase circulation to help decrease swelling   clothespins  allcolors to increase pinch strength. Other:     fes  For finger flexion   kinesio taping  To reduce swelling and pain. Flexion glove /hep x Ongoing - To increase active finger flexion. Discussed flexion glove, pt feeling as though he is not getting enough pull in PIPs and DIPs. Asked pt to bring glove in for re-adjustments, possible splint fabrication to block MCPs. Dry needling  To calm central nervous system/ increase active motion. Hep/rice grasp, tendon glides, towel scrunches x Encouraged to perform 5 x daily to improve finger mobiltiy and reduce swelling and stiffness. Contrast baths/ rice heating pad  2 x daily for reduction of swelling. Improved mobility     REAssessment/Comments: Pt is making Good progress toward stated plan of care. Progressing with , strength and functional use of the right hand.     9/17/20 11/2/20  Pain level 2- 4-5                                                                        1-3  Active wrist ext/flex: 58/45                                                       58/45  Active opposition

## 2020-11-12 NOTE — PROGRESS NOTES
[address] [facility]                Phone: [phone]   Fax: [phone]    Physical Therapy Daily Treatment Note  Date:  2020    Patient Name:  Adri Meraz    :  1955  MRN: [id number]    Referring Physician:  Kimberly Castro  Insurance Information:  Librado Cruz      Evaluation date:  20  Diagnosis:  R shoulder pain, adhesive capsulitis   PMH:  R shoulder rotator cuff repair on 20 by Dr. Timothy Acosta, R carpal tunnel release    Evaluating Physical Therapist:  Benjamín Duong DPT   Plan of care signed (Y/N):    Visit# / total visits: -     Follow-up:  20 with Dr. Tenzin Dumont     Subjective:   Pt reports that he is sore today from belt IR stretches at home. He reports soreness as 5/10 this morning. No other complaints at this time.        Exercises:     Exercise/Equipment Reps  During/ after  Other comments   NT UBE (BUE) 5 min  Level #1      Cervical retractions               - with self overpressure  3x10            - supine retraction with PT overpressure  2x10   1x5 with extension  I, B  Pt with improved R and L cervical rotation AROM on retest    NT Manual R shoulder extension stretch  10x Supine      NT IR belt stretch  8x5 sec   Increased AROM R shoulder IR on retest (L back pocket)    Manual R shoulder PROM and stretching by PT  40 min  Flexion  Abduction   ER   IR   Shoulder flexion, abduction, IR, ER  Tack and stretch with scapula/ axilla/ pecs to improve tissue length and R shoulder ROM (lat/ pec stretches)    Soft tissue friction massage with edge of reflex hammer  8 min  Pt positioned with R shoulder in passive flexion and L lateral bend to increase stretch on R lat   R pec and R axilla/ lat insertion   Mild petichii noted to R axilla following treatment   Minimal redness noted to skin over pec following treatment   Pt reports that shoulder ROM feels better following treatment    NT Table slides  Flexion 15  Abduction 15  ER 15      NT Pulleys  5 min flexion   5 min abduction      NT Wall ladder  5x5 sec hold at top with slight lean forward             HEP Cervical retraction progression           Home Exercise Program:    Cervical retractions (added self over pressure on 11/5/20)  Pulleys for shoulder PROM   Table slides (flexion, abduction, ER)     Comments:  Pt tolerated session well today  Performed some soft tissue friction and stretching on R lat and pec to improve R shoulder ROM and tissue quality   On re-test of flexion and ER pt had improved ROM   Still with mild stiffness with IR  Pt continues to progress in regards to R shoulder ROM and scapulohumeral rhythm and mechanics  He is able to actively flex R shoulder to 170 ° with minimal shoulder shrug compensation and ER to C7 with head less flexed forward now and better movement at [facility] joint        Treatment/Activity Tolerance:  [x] Patient tolerated treatment well [] Patient limited by fatique  [] Patient limited by pain  [] Patient limited by other medical complications  [] Other:     Prognosis: [x] Good [x] Fair  [] Poor    Patient Requires Follow-up: [x] Yes  [] No    Plan:   [x] Continue per plan of care [] Alter current plan (see comments)  [] Plan of care initiated [] Hold pending MD visit [] Discharge    Plan for Next Session:    Continue to work on R shoulder PROM/ AROM as tolerated   Continue to work more on R shoulder IR stretching with belt to improve ROM    Soft tissue friction massage to R lat and pec insertions       See Weekly Progress Note: []  Yes  [x]  No  Next due:          CPT codes 6/2/2020 Units    Low Complexity PT evaluation 88403 05212    Moderate Complexity PT evaluation  65514    High Complexity PT evaluation 15318    PT Re-evaluation  08670    Gait training 72275    Manual therapy  81124 3   Therapeutic activities  58574    Therapeutic exercises  57153    Neuromuscular reeducation  [id number]        Time In: 0910  Time Out: 1000    Electronically signed by:    Emilio Koyanagi, DPT  [id number]

## 2020-11-16 ENCOUNTER — HOSPITAL ENCOUNTER (OUTPATIENT)
Dept: OCCUPATIONAL THERAPY | Age: 65
Setting detail: THERAPIES SERIES
Discharge: HOME | End: 2020-11-16
Payer: MEDICARE

## 2020-11-16 ENCOUNTER — HOSPITAL ENCOUNTER (OUTPATIENT)
Dept: PHYSICAL THERAPY | Age: 65
Setting detail: THERAPIES SERIES
Discharge: HOME | End: 2020-11-16
Payer: MEDICARE

## 2020-11-16 PROCEDURE — 97022 WHIRLPOOL THERAPY: CPT | Performed by: OCCUPATIONAL THERAPIST

## 2020-11-16 PROCEDURE — 97110 THERAPEUTIC EXERCISES: CPT | Performed by: OCCUPATIONAL THERAPIST

## 2020-11-16 PROCEDURE — 97140 MANUAL THERAPY 1/> REGIONS: CPT | Performed by: OCCUPATIONAL THERAPIST

## 2020-11-16 PROCEDURE — 97110 THERAPEUTIC EXERCISES: CPT

## 2020-11-16 NOTE — PROGRESS NOTES
Ramesh  [phone]    Date:  2020     Initial Evaluation Date: 8/3/20                                  Evaluating Therapist: Denise Holm     Patient Name:  [facility])                   :  1955     Restrictions/Precautions:  none, low fall risk  Diagnosis:  Swelling R hand /CTR R                                                  Insurance/Certification information:  [place], Medicare  Referring Practitioner:  Dr. Nicole Vasquez  Referring Practitioner specific orders: eval and treat. Edema control, etc, rom. New orders 20 cont hand program. Pt. to see PT. for shoulder. Date of Surgery/Injury: CTR 7/15/20  Plan of care signed (Y/N):  No  Visit# / total visits: +12=30      Pain Level: moderate, aching    Subjective: Pt. States, \"no sign change since last seen. \"     Objective:  Updated POC to be completed by 10th visit. Last reassessment 20  INTERVENTION: COMPLETED: SPECIFICS/COMMENTS:   Modality:     fluido x 15 min - To promote tissue healing, skin desensitization, reduce inflammation, and decrease pain. Actively completed SROM in all available planes with holds at end range during treatment       Paraffin  To wrist digits    AROM:     Wrist all planes  Increase wrist rom   Pendulums  2# wt swing all directions  Added to HEP   Bean grasp x 15 min   Tendon glides  15x2   Shoulder int/ext rotation stretches  15x2, for nerve gliding and tightness rotator cuff. Reduce overall pain shoulder/hand   Place and Holds x 12 reps in full fist, 8 reps in hook fist. Increased ROM noted. Fist Release and Place x Followed after place and hold, pt to open digits in extension and quickly place digits back into full flexion in attempts to achieve same motion with place and hold. Fist Water Bead Richard [employer] Richard x Water bead ball placed in palm of hand, digits flexed and wrapped with co-band to increase flexion.  Pt to complete pumps with 11/2/20  Pain level 2- 4-5                                                                        1-3  Active wrist ext/flex: 58/45                                                       58/45  Active opposition increased to P4 level                                No change to P4 level only  Active  increased to 1/2 range/3/4 ext cont's                 Increased to 2/3-3/4 range   Edema Description/Circumferential Measurements: Mod swelling                                                                    Mod-mild swelling  Dynamometer (setting 2):                              Left: 82#                                                                           85#              Right: 15#                                                                         25#  Pinch Meter:              Lateral: Left= 15# ,Right= 12#                                       R/ 13#              Palmar 3 point: Left= 12#, Right= 12#                            R/ 14#  9 Hole Peg Test:              Left: 20 sec              Right: 31 sec                                                                   28 sec WNL     QuickDASH Score: (Scale 100% full disability, 0 no disability):  Decreased from initially 75 percent to 40%                       GOALS (Long term same as Short term):  1) Patient will demonstrate good understanding of home program (exercises/activities/diagnosis/prognosis/goals) with good accuracy. Goal progressing  2) Patient will demonstrate increased active/passive range of motion of their affected extremity/hand to [facility] for ADL/IADL completion./goal progressing  3) Patient will demonstrate increased /pinch strength of at least 10 / 5 pinch pounds of their affected hand.  Slow progress  4) Patient to report decreased pain in their affected hand/upper extremity from 2-5/10 to 0-2/10 or less with resistive functional use. Sina Hernandeza progress  5) Increase in fine motor function as evidenced by decreased time to complete 9-hole peg test and/or MRMT test by at least 30 seconds. Goal progressing  6) Patient will be knowledgeable of edema control techniques as evident with decreases from mod to none. No change   7) Patient will report ADL functions same as prior to diagnosis of swelling R hand, CTR/ goal progressing  8) Patient will demonstrate improved functional activity tolerance from assist/mod ind to ind for ADL/IADL completion. Goal progressing  9) Patient will decrease QuickDASH score by 40% for increased participation in daily functional activities. Goal progressing          -Rehab Potential: Good    -Requires OT Follow Up: Yes  Time In: 10:00 am            Time Out: 11:00 am           Treatment Charges: Mins Units   Modalities/fluido 15 1   Ther Exercise 20 1   Manual Therapy 25 2   Thera Activities     ADL/Home Mgt      Neuro Re-education     Gait Training     Group Therapy     Non-Billable Service Time     Other     Total Time/Units 60 4     Plan:   [x]  Continues Plan of care: Treatment covered based on POC and graduated to patient's progress. Pt education continues at each visit to obtain maximum benefits from skilled OT intervention. []  Alter Plan of care:   []  Discharge:      Karlene Lynn OT/L, [address] [place], [place] [id number]    COVID-19 Screening completed upon entering facility and patient cleared for treatment today. Pt followed all protocols set forth by [address] [place] [address],[address] [facility] for Outpatient services. Patient has been made aware of all new hygiene protocols due to COVID-19 set forth by [address] [place] [address],[address] Outpatient services and agrees to abide by all protocols.

## 2020-11-16 NOTE — PROGRESS NOTES
[address] [facility]                Phone: [phone]   Fax: [phone]    Physical Therapy Daily Treatment Note  Date:  2020    Patient Name:  José Parrish    :  1955  MRN: [id number]    Referring Physician:  Trenton Faustin  Insurance Information:  Ese Peer      Evaluation date:  20  Diagnosis:  R shoulder pain, adhesive capsulitis   PMH:  R shoulder rotator cuff repair on 20 by Dr. Heaven Dave, R carpal tunnel release    Evaluating Physical Therapist:  Candi Kent DPT   Plan of care signed (Y/N):    Visit# / total visits: 15 /12-     Follow-up:  20 with Dr. Abe Flores     Subjective:   Pt reports that his R shoulder has been sore since last visit. He rates soreness as 4/10 prior to session today. Pt reports that he held off on doing IR belt stretch at home last few days due to soreness.        Exercises:  (see comments below for todays treatment)   Exercise/Equipment Reps  During/ after  Other comments   NT UBE (BUE) 5 min  Level #1      Cervical retractions               - with self overpressure  3x10            - supine retraction with PT overpressure  2x10   1x5 with extension  I, B  Pt with improved R and L cervical rotation AROM on retest    NT Manual R shoulder extension stretch  10x Supine      NT IR belt stretch  8x5 sec   Increased AROM R shoulder IR on retest (L back pocket)    Manual R shoulder PROM and stretching by PT  40 min  Flexion  Abduction   ER   IR   Shoulder flexion, abduction, IR, ER  Tack and stretch with scapula/ axilla/ pecs to improve tissue length and R shoulder ROM (lat/ pec stretches)    Soft tissue friction massage with edge of reflex hammer  8 min  Pt positioned with R shoulder in passive flexion and L lateral bend to increase stretch on R lat   R pec and R axilla/ lat insertion   Mild petichii noted to R axilla following treatment   Minimal redness noted to skin over pec following treatment   Pt reports that shoulder ROM feels better following treatment    NT Table slides  Flexion 15  Abduction 15  ER 15       Pulleys  5 min flexion        NT Wall ladder  5x5 sec hold at top with slight lean forward             HEP Cervical retraction progression           Home Exercise Program:    Cervical retractions (added self over pressure on 11/5/20)  Pulleys for shoulder PROM   Table slides (flexion, abduction, ER)    Objective for 11/16/20  Hot pack to R shoulder x10 min prior to session and 10 min following session    No adverse skin reactions noted  Pt reports that heat feels good on shoulder     Pulleys x5 min (flexion only)  Pendulums with 2# wt       - 30 CW/ 30 CCW      - 30 flexion/ extension             Comments:    Strength in R shoulder remains unchanged since last session   AROM is the same since last session with no significant decrease despite soreness  Rotator cuff muscles appear to be intact based on ROM and strength     Focus today on gentle ROM and active movement to decreased stiffness and soreness in R shoulder. Cervical AROM remains unchanged and is WFL in all directions with no pain.       Pt reports 1/10 R shoulder pain following session and reports that shoulder feels \"better\"      Treatment/Activity Tolerance:  [x] Patient tolerated treatment well [] Patient limited by fatique  [] Patient limited by pain  [] Patient limited by other medical complications  [] Other:     Prognosis: [x] Good [x] Fair  [] Poor    Patient Requires Follow-up: [x] Yes  [] No    Plan:   [x] Continue per plan of care [] Alter current plan (see comments)  [] Plan of care initiated [] Hold pending MD visit [] Discharge    Plan for Next Session:    Continue to work on R shoulder PROM/ AROM as tolerated   Continue to work more on R shoulder IR stretching with belt to improve ROM    Soft tissue friction massage to R lat and pec insertions       See Weekly Progress Note: []  Yes  [x]  No  Next due:          CPT codes 6/2/2020 Units    Low Complexity PT evaluation 64818 29990    Moderate Complexity PT evaluation  78141    High Complexity PT evaluation [id number]    PT Re-evaluation  [id number]    Gait training [id number]    Manual therapy  48932    Therapeutic activities  19289    Therapeutic exercises  00021 3   Neuromuscular reeducation  [phone]        Time In: 1266  Time Out: 5946    Electronically signed by:    Corbin Guerrero DPT  [id number]

## 2020-11-19 ENCOUNTER — HOSPITAL ENCOUNTER (OUTPATIENT)
Dept: PHYSICAL THERAPY | Age: 65
Setting detail: THERAPIES SERIES
Discharge: HOME | End: 2020-11-19
Payer: MEDICARE

## 2020-11-19 NOTE — PROGRESS NOTES
[address] [facility]                Phone: [phone]  Fax: [phone]    Physical Therapy  Cancellation/No-show Note  Patient Name:  Lali Qureshi  :  1955   Date:  2020    For today's appointment patient:  [x]  Cancelled  [x]  Rescheduled appointment  []  No-show     Reason given by patient:  []  Patient ill  []  Conflicting appointment  []  No transportation    []  Conflict with work  []  No reason given  [x]  Other:  Pt called and reports that his shoulder is still sore.            Electronically signed by:    Joaquim Milner DPT  [id number]

## 2020-11-23 ENCOUNTER — HOSPITAL ENCOUNTER (OUTPATIENT)
Dept: PHYSICAL THERAPY | Age: 65
Setting detail: THERAPIES SERIES
Discharge: HOME | End: 2020-11-23
Payer: MEDICARE

## 2020-11-23 ENCOUNTER — HOSPITAL ENCOUNTER (OUTPATIENT)
Dept: OCCUPATIONAL THERAPY | Age: 65
Setting detail: THERAPIES SERIES
Discharge: HOME | End: 2020-11-23
Payer: MEDICARE

## 2020-11-23 PROCEDURE — 97022 WHIRLPOOL THERAPY: CPT | Performed by: OCCUPATIONAL THERAPIST

## 2020-11-23 PROCEDURE — 97110 THERAPEUTIC EXERCISES: CPT | Performed by: OCCUPATIONAL THERAPIST

## 2020-11-23 PROCEDURE — 97140 MANUAL THERAPY 1/> REGIONS: CPT | Performed by: OCCUPATIONAL THERAPIST

## 2020-11-23 PROCEDURE — 97140 MANUAL THERAPY 1/> REGIONS: CPT

## 2020-11-23 PROCEDURE — 97110 THERAPEUTIC EXERCISES: CPT

## 2020-11-23 NOTE — PROGRESS NOTES
Ramesh  [phone]    Date:  2020     Initial Evaluation Date: 8/3/20                                  Evaluating Therapist: Yudi Paniagua     Patient Name:  [facility])                   :  1955     Restrictions/Precautions:  none, low fall risk  Diagnosis:  Swelling R hand /CTR R                                                  Insurance/Certification information:  Selestine Lout, Medicare  Referring Practitioner:  Dr. Karlene Garg  Referring Practitioner specific orders: eval and treat. Edema control, etc, rom. New orders 20 cont hand program. Pt. to see PT. for shoulder. Date of Surgery/Injury: CTR 7/15/20  Plan of care signed (Y/N):  No  Visit# / total visits: +12=30      Pain Level: moderate, aching    Subjective: Pt. States, \"slow progress cont's. \"     Objective:  Updated POC to be completed by 10th visit. Last reassessment 20  INTERVENTION: COMPLETED: SPECIFICS/COMMENTS:   Modality:     fluido x 15 min - To promote tissue healing, skin desensitization, reduce inflammation, and decrease pain. Actively completed SROM in all available planes with holds at end range during treatment       Paraffin  To wrist digits    AROM:     Wrist all planes  Increase wrist rom   Pendulums  2# wt swing all directions  Added to HEP   Bean grasp x 15 min   Tendon glides  15x2   Shoulder int/ext rotation stretches  15x2, for nerve gliding and tightness rotator cuff. Reduce overall pain shoulder/hand   Place and Holds x 12 reps in full fist, 8 reps in hook fist. Increased ROM noted. Fist Release and Place x Followed after place and hold, pt to open digits in extension and quickly place digits back into full flexion in attempts to achieve same motion with place and hold. Fist Water Bead Richard [employer] Richard x Water bead ball placed in palm of hand, digits flexed and wrapped with co-band to increase flexion.  Pt to complete pumps with focus on Goal progressing  6) Patient will be knowledgeable of edema control techniques as evident with decreases from mod to none. No change   7) Patient will report ADL functions same as prior to diagnosis of swelling R hand, CTR/ goal progressing  8) Patient will demonstrate improved functional activity tolerance from assist/mod ind to ind for ADL/IADL completion. Goal progressing  9) Patient will decrease QuickDASH score by 40% for increased participation in daily functional activities. Goal progressing          -Rehab Potential: Good    -Requires OT Follow Up: Yes  Time In: 10:00 am            Time Out: 11:00 am           Treatment Charges: Mins Units   Modalities/fluido 15 1   Ther Exercise 20 1   Manual Therapy 25 2   Thera Activities     ADL/Home Mgt      Neuro Re-education     Gait Training     Group Therapy     Non-Billable Service Time     Other     Total Time/Units 60 4     Plan:   [x]  Continues Plan of care: Treatment covered based on POC and graduated to patient's progress. Pt education continues at each visit to obtain maximum benefits from skilled OT intervention. []  Alter Plan of care:   []  Discharge:      Cathy Ordoñez OT/L, [address] [place] [address]    COVID-19 Screening completed upon entering facility and patient cleared for treatment today. Pt followed all protocols set forth by [address] [place] [address],[address] for Outpatient services. Patient has been made aware of all new hygiene protocols due to COVID-19 set forth by [address] [place] [address],[address] Outpatient services and agrees to abide by all protocols.

## 2020-11-23 NOTE — PROGRESS NOTES
axilla following treatment   Minimal redness noted to skin over pec following treatment   Pt reports that shoulder ROM feels better following treatment    NT Table slides  Flexion 15  Abduction 15  ER 15      NT Pulleys  5 min flexion        NT Wall ladder  5x5 sec hold at top with slight lean forward             HEP Cervical retraction progression           Home Exercise Program:    Cervical retractions (added self over pressure on 11/5/20)  Pulleys for shoulder PROM   Table slides (flexion, abduction, ER)     Comments:  Pt tolerated stretching of R shoulder and scapula well with minimal complaints of pain  Continues to be limited with IR but slowly improving   Flexion and abduction PROM continue to improve with less tissue resistance to stretch at end range   Strength continues to be the same as focus continues to be on restoring R shoulder and scapular ROM at this time  Pt progressing well    Pt with no complaints of pain/ soreness following session today      Treatment/Activity Tolerance:  [x] Patient tolerated treatment well [] Patient limited by fatique  [] Patient limited by pain  [] Patient limited by other medical complications  [] Other:     Prognosis: [x] Good [x] Fair  [] Poor    Patient Requires Follow-up: [x] Yes  [] No    Plan:   [x] Continue per plan of care [] Alter current plan (see comments)  [] Plan of care initiated [] Hold pending MD visit [] Discharge    Plan for Next Session:    Continue to work on R shoulder PROM/ AROM as tolerated   Continue to work more on R shoulder IR stretching with belt to improve ROM    Soft tissue friction massage to R lat and pec insertions       See Weekly Progress Note: []  Yes  [x]  No  Next due:          CPT codes 6/2/2020 Units    Low Complexity PT evaluation 96716 73990    Moderate Complexity PT evaluation  05608    High Complexity PT evaluation 53378    PT Re-evaluation  92421    Gait training 51823    Manual therapy  [phone]   Therapeutic activities  [phone] Therapeutic exercises  26540 1   Neuromuscular reeducation  [address]        Time In: 0900  Time Out: 1000    Electronically signed by:    Danica Cornejo DPT  [id number]

## 2020-11-24 ENCOUNTER — HOSPITAL ENCOUNTER (OUTPATIENT)
Dept: PHYSICAL THERAPY | Age: 65
Setting detail: THERAPIES SERIES
Discharge: HOME | End: 2020-11-24
Payer: MEDICARE

## 2020-11-24 PROCEDURE — 97140 MANUAL THERAPY 1/> REGIONS: CPT

## 2020-11-24 PROCEDURE — 97110 THERAPEUTIC EXERCISES: CPT

## 2020-11-24 NOTE — PROGRESS NOTES
[facility]                Phone: [phone]   Fax: [phone]    Physical Therapy Daily Treatment Note  Date:  2020    Patient Name:  Fransisco Heart    :  1955  MRN: [id number]    Referring Physician:  Román Chaves  Insurance Information:  Juancarlos Christian      Evaluation date:  20  Diagnosis:  R shoulder pain, adhesive capsulitis   PMH:  R shoulder rotator cuff repair on 20 by Dr. Anderson Little, R carpal tunnel release    Evaluating Physical Therapist:  Gale Hay DPT   Plan of care signed (Y/N):    Visit# / total visits: -     Follow-up:  20 with Dr. Walters     Subjective:   Pt reports minimal R shoulder soreness prior to session today.         Exercises:     Exercise/Equipment Reps  During/ after  Other comments   NT UBE (BUE) 8 min  Level #3      Cervical retractions               - with self overpressure  3x10            - supine retraction with PT overpressure  2x10   1x5 with extension  I, B  Pt with improved R and L cervical rotation AROM on retest     Manual R shoulder extension stretch  10x Supine      NT IR belt stretch  8x5 sec   Increased AROM R shoulder IR on retest (L back pocket)    Manual R shoulder PROM and stretching by PT  20 min  Flexion  Abduction   ER   IR   Shoulder flexion, abduction, IR, ER  Tack and stretch with scapula/ axilla/ pecs to improve tissue length and R shoulder ROM (lat/ pec stretches)  Working on combined ER and flexion movement patterns    NT Soft tissue friction massage with edge of reflex hammer  8 min  Pt positioned with R shoulder in passive flexion and L lateral bend to increase stretch on R lat   R pec and R axilla/ lat insertion   Mild petichii noted to R axilla following treatment   Minimal redness noted to skin over pec following treatment   Pt reports that shoulder ROM feels better following treatment    NT Table slides  Flexion 15  Abduction 15  ER 15       Pulleys  5 min flexion   5 min abduction      NT Wall ladder  5x5 sec hold at top with slight lean forward             HEP Cervical retraction progression           Home Exercise Program:    Cervical retractions (added self over pressure on 11/5/20)  Pulleys for shoulder PROM   Table slides (flexion, abduction, ER)       Comments:  Pt tolerated session well.     Flexion PROM continues to improve with less tissue resistance and pain at end range   IR continues to be stiff  ER PROM is gradually improving     Pt making good progress       Treatment/Activity Tolerance:  [x] Patient tolerated treatment well [] Patient limited by fatique  [] Patient limited by pain  [] Patient limited by other medical complications  [] Other:     Prognosis: [x] Good [x] Fair  [] Poor    Patient Requires Follow-up: [x] Yes  [] No    Plan:   [x] Continue per plan of care [] Alter current plan (see comments)  [] Plan of care initiated [] Hold pending MD visit [] Discharge    Plan for Next Session:    Continue to work on R shoulder PROM/ AROM as tolerated   Continue to work more on R shoulder IR stretching with belt to improve ROM    Soft tissue friction massage to R lat and pec insertions       See Weekly Progress Note: []  Yes  [x]  No  Next due:          CPT codes 6/2/2020 Units    Low Complexity PT evaluation 66202 79881    Moderate Complexity PT evaluation  33130    High Complexity PT evaluation 34137    PT Re-evaluation  41049    Gait training 03667    Manual therapy  57691 1   Therapeutic activities  68750    Therapeutic exercises  43560 2   Neuromuscular reeducation  [id number]        Time In: [address]  Time Out: [address]    Electronically signed by:    Adalid Alvarado DPT  [id number]

## 2020-11-25 ENCOUNTER — HOSPITAL ENCOUNTER (OUTPATIENT)
Dept: OCCUPATIONAL THERAPY | Age: 65
Setting detail: THERAPIES SERIES
Discharge: HOME | End: 2020-11-25
Payer: MEDICARE

## 2020-11-25 PROCEDURE — 97110 THERAPEUTIC EXERCISES: CPT | Performed by: OCCUPATIONAL THERAPIST

## 2020-11-25 PROCEDURE — 97140 MANUAL THERAPY 1/> REGIONS: CPT | Performed by: OCCUPATIONAL THERAPIST

## 2020-11-25 PROCEDURE — 97022 WHIRLPOOL THERAPY: CPT | Performed by: OCCUPATIONAL THERAPIST

## 2020-11-25 NOTE — PROGRESS NOTES
[facility]  [phone]    Date:  2020     Initial Evaluation Date: 8/3/20                                  Evaluating Therapist: Kerri Delarosa     Patient Name:  [facility])                   :  1955     Restrictions/Precautions:  none, low fall risk  Diagnosis:  Swelling R hand /CTR R                                                  Insurance/Certification information:  Fabio Delgado, Medicare  Referring Practitioner:  Dr. Silvina Sullivan  Referring Practitioner specific orders: eval and treat. Edema control, etc, rom. New orders 20 cont hand program. Pt. to see PT. for shoulder. Date of Surgery/Injury: CTR 7/15/20  Plan of care signed (Y/N):  No  Visit# / total visits: +12=30      Pain Level: moderate, aching    Subjective: Pt. States, \"slow progress cont's. \"     Objective:  Updated POC to be completed by 10th visit. Last reassessment 20  INTERVENTION: COMPLETED: SPECIFICS/COMMENTS:   Modality:     fluido x 15 min - To promote tissue healing, skin desensitization, reduce inflammation, and decrease pain. Actively completed SROM in all available planes with holds at end range during treatment       Paraffin  To wrist digits    AROM:     Wrist all planes  Increase wrist rom   Pendulums  2# wt swing all directions  Added to HEP   Bean grasp x 15 min   Tendon glides  15x2   Shoulder int/ext rotation stretches  15x2, for nerve gliding and tightness rotator cuff. Reduce overall pain shoulder/hand   Place and Holds x 12 reps in full fist, 8 reps in hook fist. Increased ROM noted. Fist Release and Place x Followed after place and hold, pt to open digits in extension and quickly place digits back into full flexion in attempts to achieve same motion with place and hold. Fist Water Bead [employer] x Water bead ball placed in palm of hand, digits flexed and wrapped with co-band to increase flexion.  Pt to complete pumps with focus on digging DIPs and PIPs into ball.  reps. Finger flex/ext     AAROM:     Shoulder flexion/ex x Wall pulley all directions  Added to HEP        PROM/Stretching:     Wrist /fingers as erin x 15 min Focus on full fist and hook fist. ^'d aggressivenses as tolerated. Putty Fist Rolls x 15 reps fist rolling in putty to loosen digits prior to PROM   Scar Mass/Edema Control:     Soft tissue mobilitzation x 10 min to increase tissue mobiltiy ,. Increase scar mobility        Strengthening:     3# wrist ext/flex x 15x2 increase wrist strength. digiflex green x 3 min, finger mobility and stengthening   tan flexbar x 15x2    Putty grasp/pull/pnch/  15 min /improving  strength and pinch    3.3 pound  x 15x2 to improve grasp/   UBE  10 min to increase circulation to help decrease swelling   clothespins  allcolors to increase pinch strength. Other:     fes  For finger flexion   kinesio taping  To reduce swelling and pain. Flexion glove /hep x Ongoing - To increase active finger flexion. Discussed flexion glove, pt feeling as though he is not getting enough pull in PIPs and DIPs. Asked pt to bring glove in for re-adjustments, possible splint fabrication to block MCPs. PT. To carry a 3# weight throughout the day/ and perform table compressions x To perform several times throughout the day secondary possible crps symptoms     . Hep/rice grasp, tendon glides, towel scrunches x Encouraged to perform 5 x daily to improve finger mobiltiy and reduce swelling and stiffness. Contrast baths/ rice heating pad  2 x daily for reduction of swelling. Improved mobility     REAssessment/Comments: Pt is making Good progress toward stated plan of care. Less tightness noted today. PRogress cont's with improving . Less overall swelling is noted, today. Nerve regeneration is noted.     9/17/20 11/2/20  Pain level 2- 4-5 1-3  Active wrist ext/flex: 58/45                                                       58/45  Active opposition increased to P4 level                                No change to P4 level only  Active  increased to 1/2 range/3/4 ext cont's                 Increased to 2/3-3/4 range   Edema Description/Circumferential Measurements: Mod swelling                                                                    Mod-mild swelling  Dynamometer (setting 2):                              Left: 82#                                                                           85#              Right: 15#                                                                         25#  Pinch Meter:              Lateral: Left= 15# ,Right= 12#                                       R/ 13#              Palmar 3 point: Left= 12#, Right= 12#                            R/ 14#  9 Hole Peg Test:              Left: 20 sec              Right: 31 sec                                                                   28 sec WNL     QuickDASH Score: (Scale 100% full disability, 0 no disability):  Decreased from initially 75 percent to 40%                       GOALS (Long term same as Short term):  1) Patient will demonstrate good understanding of home program (exercises/activities/diagnosis/prognosis/goals) with good accuracy. Goal progressing  2) Patient will demonstrate increased active/passive range of motion of their affected extremity/hand to [facility] for ADL/IADL completion./goal progressing  3) Patient will demonstrate increased /pinch strength of at least 10 / 5 pinch pounds of their affected hand.  Slow progress  4) Patient to report decreased pain in their affected hand/upper extremity from 2-5/10 to 0-2/10 or less with resistive functional use. [place] progress  5) Increase in fine motor function as evidenced by decreased time to complete 9-hole peg test and/or MRMT test by at least 30 seconds. Goal progressing  6) Patient will be knowledgeable of edema control techniques as evident with decreases from mod to none. No change   7) Patient will report ADL functions same as prior to diagnosis of swelling R hand, CTR/ goal progressing  8) Patient will demonstrate improved functional activity tolerance from assist/mod ind to ind for ADL/IADL completion. Goal progressing  9) Patient will decrease QuickDASH score by 40% for increased participation in daily functional activities. Goal progressing          -Rehab Potential: Good    -Requires OT Follow Up: Yes  Time In: 10:00 am            Time Out: 11:00 am           Treatment Charges: Mins Units   Modalities/fluido 15 1   Ther Exercise 20 1   Manual Therapy 25 2   Thera Activities     ADL/Home Mgt      Neuro Re-education     Gait Training     Group Therapy     Non-Billable Service Time     Other     Total Time/Units 60 4     Plan:   [x]  Continues Plan of care: Treatment covered based on POC and graduated to patient's progress. Pt education continues at each visit to obtain maximum benefits from skilled OT intervention. []  Alter Plan of care:   []  Discharge:      Mayte Hoang OT/L, [address] [place] [address]    COVID-19 Screening completed upon entering facility and patient cleared for treatment today. Pt followed all protocols set forth by [facility] for Outpatient services. Patient has been made aware of all new hygiene protocols due to COVID-19 set forth by [facility] Outpatient services and agrees to abide by all protocols.

## 2020-11-30 ENCOUNTER — HOSPITAL ENCOUNTER (OUTPATIENT)
Dept: OCCUPATIONAL THERAPY | Age: 65
Setting detail: THERAPIES SERIES
Discharge: HOME | End: 2020-11-30
Payer: MEDICARE

## 2020-11-30 PROCEDURE — 97110 THERAPEUTIC EXERCISES: CPT | Performed by: OCCUPATIONAL THERAPIST

## 2020-11-30 PROCEDURE — 97140 MANUAL THERAPY 1/> REGIONS: CPT | Performed by: OCCUPATIONAL THERAPIST

## 2020-11-30 PROCEDURE — 97022 WHIRLPOOL THERAPY: CPT | Performed by: OCCUPATIONAL THERAPIST

## 2020-11-30 NOTE — PROGRESS NOTES
[facility]  [phone]    Date:  2020     Initial Evaluation Date: 8/3/20                                  Evaluating Therapist: Hossein Guevara     Patient Name:  [facility])                   :  1955     Restrictions/Precautions:  none, low fall risk  Diagnosis:  Swelling R hand /CTR R                                                  Insurance/Certification information:  [facility] [address] Medicare  Referring Practitioner:  Dr. Christi Grace  Referring Practitioner specific orders: eval and treat. Edema control, etc, rom. New orders 20 cont hand program. Pt. to see PT. for shoulder. Date of Surgery/Injury: CTR 7/15/20  Plan of care signed (Y/N):  No  Visit# / total visits: +12=30      Pain Level: moderate, aching    Subjective: Pt. States, \"slow progress cont's. Not ready to stop coming for therapy since progress does cont. \"     Objective:  Updated POC to be completed by 10th visit. Last reassessment 20  INTERVENTION: COMPLETED: SPECIFICS/COMMENTS:   Modality:     fluido x 15 min - To promote tissue healing, skin desensitization, reduce inflammation, and decrease pain. Actively completed SROM in all available planes with holds at end range during treatment       Paraffin  To wrist digits    AROM:     Wrist all planes  Increase wrist rom   Pendulums  2# wt swing all directions  Added to HEP   Bean grasp x 15 min   Tendon glides  15x2   Shoulder int/ext rotation stretches  15x2, for nerve gliding and tightness rotator cuff. Reduce overall pain shoulder/hand   Place and Holds x 12 reps in full fist, 8 reps in hook fist. Increased ROM noted. Fist Release and Place x Followed after place and hold, pt to open digits in extension and quickly place digits back into full flexion in attempts to achieve same motion with place and hold.     Fist Water Bead Richard [employer] Richard x Water bead ball placed in palm of hand, digits flexed and wrapped with co-band to increase flexion. Pt to complete pumps with focus on digging DIPs and PIPs into ball.  reps. Finger flex/ext     AAROM:     Shoulder flexion/ex x Wall pulley all directions  Added to HEP        PROM/Stretching:     Wrist /fingers as erin x 15 min Focus on full fist and hook fist. ^'d aggressivenses as tolerated. Putty Fist Rolls x 15 reps fist rolling in putty to loosen digits prior to PROM   Scar Mass/Edema Control:     Soft tissue mobilitzation x 10 min to increase tissue mobiltiy ,. Increase scar mobility        Strengthening:     3# wrist ext/flex x 15x2 increase wrist strength. digiflex green x 3 min, finger mobility and stengthening   tan flexbar x 15x2    Putty grasp/pull/pnch/  15 min /improving  strength and pinch    3.3 pound  x 15x2 to improve grasp/   UBE  10 min to increase circulation to help decrease swelling   clothespins  allcolors to increase pinch strength. Other:     fes  For finger flexion   kinesio taping  To reduce swelling and pain. Flexion glove /hep x Ongoing - To increase active finger flexion. Discussed flexion glove, pt feeling as though he is not getting enough pull in PIPs and DIPs. Asked pt to bring glove in for re-adjustments, possible splint fabrication to block MCPs. PT. To carry a 3# weight throughout the day/ and perform table compressions x To perform several times throughout the day secondary possible crps symptoms     . Hep/rice grasp, tendon glides, towel scrunches x Encouraged to perform 5 x daily to improve finger mobiltiy and reduce swelling and stiffness. Contrast baths/ rice heating pad  2 x daily for reduction of swelling. Improved mobility     REAssessment/Comments: Pt is making Good progress toward stated plan of care. Less tightness noted today. PRogress cont's with improving . Less overall swelling is noted, today. Nerve regeneration is noted.     9/17/20 11/2/20 11/30/20  Pain level 2- 4-5                                                                        1-3  Active wrist ext/flex: 58/45                                                       58/45                                                       60/45  Active opposition increased to P4 level                                No change to P4 level only                 No change  Active  increased to 1/2 range/3/4 ext cont's                 Increased to 2/3-3/4 range                   Cont's 2/3-3/4 range   Edema Description/Circumferential Measurements:               Mod swelling                                                                    Mod-mild swelling                                    Mod swelling conts  Dynamometer (setting 2):                              Left: 82#                                                                           85#                                                          100#              Right: 15#                                                                         25#                                                          30#  Pinch Meter:              Lateral: Left= 15# ,Right= 12#                                       R/ 13#                                                        R/14#              Palmar 3 point: Left= 12#, Right= 12#                            R/ 14#                                                       R/ 14#  9 Hole Peg Test:              Left: 20 sec              Right: 31 sec                                                                   28 sec WNL                                              WNL/ goal met     QuickDASH Score: (Scale 100% full disability, 0 no disability):  Decreased from initially 75 percent to 35%                       GOALS (Long term same as Short term):  1) Patient will demonstrate good understanding of home program (exercises/activities/diagnosis/prognosis/goals) with good accuracy. Goal progressing  2) Patient will demonstrate increased active/passive range of motion of their affected extremity/hand to [facility] for ADL/IADL completion./goal progressing  3) Patient will demonstrate increased /pinch strength of at least 10 / 5 pinch pounds of their affected hand. Slow progress  4) Patient to report decreased pain in their affected hand/upper extremity from 2-5/10 to 0-2/10 or less with resistive functional use. [place]ie [place] progress  5) Increase in fine motor function as evidenced by decreased time to complete 9-hole peg test and/or MRMT test by at least 30 seconds. Goal progressing  6) Patient will be knowledgeable of edema control techniques as evident with decreases from mod to none. No change   7) Patient will report ADL functions same as prior to diagnosis of swelling R hand, CTR/ goal progressing  8) Patient will demonstrate improved functional activity tolerance from assist/mod ind to ind for ADL/IADL completion. Goal progressing  9) Patient will decrease QuickDASH score by 40% for increased participation in daily functional activities.  Goal progressing          -Rehab Potential: Good    -Requires OT Follow Up: Yes  Time In: 10:00 am            Time Out: 11:00 am           Treatment Charges: Mins Units   Modalities/fluido 15 1   Ther Exercise 20 1   Manual Therapy 25 2   Thera Activities     ADL/Home Mgt      Neuro Re-education     Gait Training     Group Therapy     Non-Billable Service Time     Other     Total Time/Units 60 4   Frequency/Duration 2-3 / week for [id number].   Certification period From: present  To: 1/30/20     I have reviewed the Plan of Care established for skilled therapy services and certify that the services are required and that they will be provided while the patient is under my care.     Physician's Comments/Revisions:                   Physicians's Printed Name:   Bubba                                Physician's Signature:                                                               NAOMIF:        Plan:   [x]  Continues Plan of care: Treatment covered based on POC and graduated to patient's progress. Pt education continues at each visit to obtain maximum benefits from skilled OT intervention. []  Alter Plan of care:   []  Discharge:      Mohamud Luz OT/L, [address] [facility] [id number]    COVID-19 Screening completed upon entering facility and patient cleared for treatment today. Pt followed all protocols set forth by [facility] for Outpatient services. Patient has been made aware of all new hygiene protocols due to COVID-19 set forth by [facility] Outpatient services and agrees to abide by all protocols.

## 2020-12-01 ENCOUNTER — HOSPITAL ENCOUNTER (OUTPATIENT)
Dept: PHYSICAL THERAPY | Age: 65
Setting detail: THERAPIES SERIES
Discharge: HOME | End: 2020-12-01
Payer: MEDICARE

## 2020-12-01 PROCEDURE — 97140 MANUAL THERAPY 1/> REGIONS: CPT

## 2020-12-01 PROCEDURE — 97110 THERAPEUTIC EXERCISES: CPT

## 2020-12-01 NOTE — PROGRESS NOTES
[address] [facility]                Phone: [phone]   Fax: [phone]    Physical Therapy Daily Treatment Note  Date:  2020    Patient Name:  Yodit Branham    :  1955  MRN: [id number]    Referring Physician:  Angela Hilario  Insurance Information:  Jesus Conde      Evaluation date:  20  Diagnosis:  R shoulder pain, adhesive capsulitis   PMH:  R shoulder rotator cuff repair on 20 by Dr. Darya Martin, R carpal tunnel release    Evaluating Physical Therapist:  Vida Irene DPT   Plan of care signed (Y/N):    Visit# / total visits: 15 /12-     Follow-up:  20 with Dr. Misty Montelongo     Subjective:   Pt reports some R shoulder soreness today from painting his dining room at home the last few days. He rates soreness as 2/10 prior to session today. Pt denies cervical pain today.         Exercises:     Exercise/Equipment Reps  During/ after  Other comments    UBE (BUE) 8 min  Level #4.5     NT Cervical retractions               - with self overpressure  3x10            - supine retraction with PT overpressure  2x10   1x5 with extension  I, B  Pt with improved R and L cervical rotation AROM on retest     Manual R shoulder extension stretch  10x Supine      NT IR belt stretch  8x5 sec   Increased AROM R shoulder IR on retest (L back pocket)    Manual R shoulder PROM and stretching by PT  50 min  Flexion  Abduction   ER   IR   Shoulder flexion, abduction, IR, ER  IR and extension stretching behind back with PT overpressure to tolerance   Tack and stretch with scapula/ axilla/  to improve tissue length and R shoulder ROM (lat/ pec stretches)  Working on combined ER and flexion movement patterns    NT Soft tissue friction massage with edge of reflex hammer  8 min  Pt positioned with R shoulder in passive flexion and L lateral bend to increase stretch on R lat   R pec and R axilla/ lat insertion   Mild petichii noted to R axilla following treatment Minimal redness noted to skin over pec following treatment   Pt reports that shoulder ROM feels better following treatment    NT Table slides  Flexion 15  Abduction 15  ER 15      NT Pulleys  5 min flexion   5 min abduction      NT Wall ladder  5x5 sec hold at top with slight lean forward             HEP Cervical retraction progression           Home Exercise Program:    Cervical retractions (added self over pressure on 11/5/20)  Pulleys for shoulder PROM   Table slides (flexion, abduction, ER)       Comments:  Pt tolerated PROM and stretching well today  Worked on more R shoulder extension and IR today which is still limited but slowly improving   Flexion and abduction continue to improve each session    Pt instructed to perform activity recovery exercises today at home such as pulleys, pendulums, and table slides. He verbalized understanding HEP.           Treatment/Activity Tolerance:  [x] Patient tolerated treatment well [] Patient limited by fatique  [] Patient limited by pain  [] Patient limited by other medical complications  [] Other:     Prognosis: [x] Good [x] Fair  [] Poor    Patient Requires Follow-up: [x] Yes  [] No    Plan:   [x] Continue per plan of care [] Alter current plan (see comments)  [] Plan of care initiated [] Hold pending MD visit [] Discharge    Plan for Next Session:    Continue to work on R shoulder PROM/ AROM as tolerated   Continue to work more on R shoulder IR stretching with belt to improve ROM    Soft tissue friction massage to R lat and pec insertions       See Weekly Progress Note: []  Yes  [x]  No  Next due:          CPT codes 6/2/2020 Units    Low Complexity PT evaluation [id number]    Moderate Complexity PT evaluation  52957    High Complexity PT evaluation 80458    PT Re-evaluation  39668    Gait training 37915    Manual therapy  52526 3   Therapeutic activities  61890    Therapeutic exercises  03156 1   Neuromuscular reeducation  [phone]        Time In: 1007  Time Out: [address]    Electronically signed by:    Og Ku, GENET  [id number]

## 2020-12-03 ENCOUNTER — HOSPITAL ENCOUNTER (OUTPATIENT)
Dept: PHYSICAL THERAPY | Age: 65
Setting detail: THERAPIES SERIES
Discharge: HOME | End: 2020-12-03
Payer: MEDICARE

## 2020-12-03 ENCOUNTER — HOSPITAL ENCOUNTER (OUTPATIENT)
Dept: OCCUPATIONAL THERAPY | Age: 65
Setting detail: THERAPIES SERIES
Discharge: HOME | End: 2020-12-03
Payer: MEDICARE

## 2020-12-03 PROCEDURE — 97110 THERAPEUTIC EXERCISES: CPT

## 2020-12-03 PROCEDURE — 97110 THERAPEUTIC EXERCISES: CPT | Performed by: OCCUPATIONAL THERAPIST

## 2020-12-03 PROCEDURE — 97140 MANUAL THERAPY 1/> REGIONS: CPT | Performed by: OCCUPATIONAL THERAPIST

## 2020-12-03 PROCEDURE — 97140 MANUAL THERAPY 1/> REGIONS: CPT

## 2020-12-03 PROCEDURE — 97022 WHIRLPOOL THERAPY: CPT | Performed by: OCCUPATIONAL THERAPIST

## 2020-12-03 NOTE — PROGRESS NOTES
[facility]  [phone]    Date:  12/3/2020     Initial Evaluation Date: 8/3/20                                  Evaluating Therapist: Valdez Baldwin     Patient Name:  [facility])                   :  1955     Restrictions/Precautions:  none, low fall risk  Diagnosis:  Swelling R hand /CTR R                                                  Insurance/Certification information:  Nilsa Caballero, Medicare  Referring Practitioner:  Dr. Betito Gibbons  Referring Practitioner specific orders: eval and treat. Edema control, etc, rom. New orders 20 cont hand program. Pt. to see PT. for shoulder. Date of Surgery/Injury: CTR 7/15/20  Plan of care signed (Y/N):  No  Visit# / total visits: +12=30+18=48     Pain Level: moderate, aching    Subjective: Pt. States, \"progressing, but frustrated with slow progress. \"     Objective:  Updated POC to be completed by 10th visit. Last reassessment 20  INTERVENTION: COMPLETED: SPECIFICS/COMMENTS:   Modality:     fluido x 15 min - To promote tissue healing, skin desensitization, reduce inflammation, and decrease pain. Actively completed SROM in all available planes with holds at end range during treatment       Paraffin  To wrist digits    AROM:     Wrist all planes  Increase wrist rom   Pendulums  2# wt swing all directions  Added to HEP   Bean grasp x 15 min   Tendon glides  15x2   Shoulder int/ext rotation stretches  15x2, for nerve gliding and tightness rotator cuff. Reduce overall pain shoulder/hand   Place and Holds x 12 reps in full fist, 8 reps in hook fist. Increased ROM noted. Fist Release and Place x Followed after place and hold, pt to open digits in extension and quickly place digits back into full flexion in attempts to achieve same motion with place and hold. Fist Water Bead Richard [employer] Richard x Water bead ball placed in palm of hand, digits flexed and wrapped with co-band to increase flexion.  Pt to complete pumps with focus on digging DIPs and PIPs into ball.  reps. Finger flex/ext     AAROM:     Shoulder flexion/ex x Wall pulley all directions  Added to HEP        PROM/Stretching:     Wrist /fingers as erin x 15 min Focus on full fist and hook fist. ^'d aggressivenses as tolerated. Putty Fist Rolls x 15 reps fist rolling in putty to loosen digits prior to PROM   Scar Mass/Edema Control:     Soft tissue mobilitzation x 10 min to increase tissue mobiltiy ,. Increase scar mobility        Strengthening:     3# wrist ext/flex x 15x2 increase wrist strength. digiflex green x 3 min, finger mobility and stengthening   tan flexbar x 15x2    Putty grasp/pull/pnch/  15 min /improving  strength and pinch    3.3 pound  x 15x2 to improve grasp/   UBE  10 min to increase circulation to help decrease swelling   clothespins  allcolors to increase pinch strength. Other:     fes  For finger flexion   kinesio taping  To reduce swelling and pain. Flexion glove /hep x Ongoing - To increase active finger flexion. Discussed flexion glove, pt feeling as though he is not getting enough pull in PIPs and DIPs. Asked pt to bring glove in for re-adjustments, possible splint fabrication to block MCPs. PT. To carry a 3# weight throughout the day/ and perform table compressions x To perform several times throughout the day secondary possible crps symptoms     . Hep/rice grasp, tendon glides, towel scrunches x Encouraged to perform 5 x daily to improve finger mobiltiy and reduce swelling and stiffness. Contrast baths/ rice heating pad  2 x daily for reduction of swelling. Improved mobility     REAssessment/Comments: Pt is making Good progress toward stated plan of care.      9/17/20 11/2/20 11/30/20  Pain level 2- 4-5 1-3  Active wrist ext/flex: 58/45                                                       58/45                                                       60/45  Active opposition increased to P4 level                                No change to P4 level only                 No change  Active  increased to 1/2 range/3/4 ext cont's                 Increased to 2/3-3/4 range                   Cont's 2/3-3/4 range   Edema Description/Circumferential Measurements: Mod swelling                                                                    Mod-mild swelling                                    Mod swelling conts  Dynamometer (setting 2):                              Left: 82#                                                                           85#                                                          100#              Right: 15#                                                                         25#                                                          30#  Pinch Meter:              Lateral: Left= 15# ,Right= 12#                                       R/ 13#                                                        R/14#              Palmar 3 point: Left= 12#, Right= 12#                            R/ 14#                                                       R/ 14#  9 Hole Peg Test:              Left: 20 sec              Right: 31 sec                                                                   28 sec WNL                                              WNL/ goal met     QuickDASH Score: (Scale 100% full disability, 0 no disability):  Decreased from initially 75 percent to 35%                       GOALS (Long term same as Short term):  1) Patient will demonstrate good understanding of home program (exercises/activities/diagnosis/prognosis/goals) with good accuracy.  Goal progressing  2) Patient will demonstrate increased active/passive range of motion of their affected graduated to patient's progress. Pt education continues at each visit to obtain maximum benefits from skilled OT intervention. []  Alter Plan of care:   []  Discharge:      Mayte Hoang OT/L, [address] [facility] [id number]    COVID-19 Screening completed upon entering facility and patient cleared for treatment today. Pt followed all protocols set forth by [facility] for Outpatient services. Patient has been made aware of all new hygiene protocols due to COVID-19 set forth by [facility] Outpatient services and agrees to abide by all protocols.

## 2020-12-03 NOTE — PROGRESS NOTES
[address] [facility] [place]                Phone: [phone]   Fax: [phone]    Physical Therapy Daily Treatment Note  Date:  12/3/2020    Patient Name:  Anneliese Ruano    :  1955  MRN: [id number]    Referring Physician:  Yrn Walls  Insurance Information:  Sven Dent      Evaluation date:  20  Diagnosis:  R shoulder pain, adhesive capsulitis   PMH:  R shoulder rotator cuff repair on 20 by Dr. Gary Burch, R carpal tunnel release    Evaluating Physical Therapist:  Jagdish Auguste DPT   Plan of care signed (Y/N):    Visit# / total visits: -     Follow-up:  20 with Dr. Jones Servant:   Pt reports soreness as 2/10 prior to session today.       Exercises:     Exercise/Equipment Reps  During/ after  Other comments   NT UBE (BUE) 8 min  Level #4.5     NT Cervical retractions               - with self overpressure  3x10            - supine retraction with PT overpressure  2x10   1x5 with extension  I, B  Pt with improved R and L cervical rotation AROM on retest     Manual R shoulder extension stretch  10x Supine      NT IR belt stretch  8x5 sec   Increased AROM R shoulder IR on retest (L back pocket)    Manual R shoulder PROM and stretching by PT  50 min  Flexion  Abduction   ER   IR   Shoulder flexion, abduction, IR, ER  IR and extension stretching behind back with PT overpressure to tolerance   Tack and stretch with scapula/ axilla/  to improve tissue length and R shoulder ROM (lat/ pec stretches)  Working on combined ER and flexion movement patterns     Soft tissue friction massage with edge of reflex hammer  10 min  Pt positioned with R shoulder in passive flexion and L lateral bend to increase stretch on R lat   R pec and R axilla/ lat insertion   R mid/ upper trap   R scapula   Mild petichii noted to R/ mid/ upper traps and axilla following treatment   Minimal redness noted to skin over pec following treatment   Pt reports that shoulder ROM feels better following treatment    NT Table slides  Flexion 15  Abduction 15  ER 15       Pulleys  5 min flexion   5 min abduction      NT Wall ladder  5x5 sec hold at top with slight lean forward             HEP Cervical retraction progression           Home Exercise Program:    Cervical retractions (added self over pressure on 11/5/20)  Pulleys for shoulder PROM   Table slides (flexion, abduction, ER)       Comments:  Pt tolerated PROM and stretching well today  Soft tissue work to increase R shoulder ROM and tissue quality   Bilateral active shoulder flexion was nearly symmetrical by end of session today  Still goes into slight flexion with attempts at pure R shoulder abduction           Treatment/Activity Tolerance:  [x] Patient tolerated treatment well [] Patient limited by fatique  [] Patient limited by pain  [] Patient limited by other medical complications  [] Other:     Prognosis: [x] Good [x] Fair  [] Poor    Patient Requires Follow-up: [x] Yes  [] No    Plan:   [x] Continue per plan of care [] Alter current plan (see comments)  [] Plan of care initiated [] Hold pending MD visit [] Discharge    Plan for Next Session:    Continue to work on R shoulder PROM/ AROM as tolerated   Continue to work more on R shoulder IR stretching with belt to improve ROM    Soft tissue friction massage to R lat and pec insertions       See Weekly Progress Note: []  Yes  [x]  No  Next due:          CPT codes 6/2/2020 Units    Low Complexity PT evaluation 66851 42347    Moderate Complexity PT evaluation  45338    High Complexity PT evaluation [id number]    PT Re-evaluation  [id number]    Gait training 49313    Manual therapy  53340 3   Therapeutic activities  12165    Therapeutic exercises  36497 1   Neuromuscular reeducation  [id number]        Time In: 0900  Time Out: [address]    Electronically signed by:    Jami Dunlap DPT  [id number]

## 2020-12-07 ENCOUNTER — HOSPITAL ENCOUNTER (OUTPATIENT)
Dept: OCCUPATIONAL THERAPY | Age: 65
Setting detail: THERAPIES SERIES
Discharge: HOME | End: 2020-12-07
Payer: MEDICARE

## 2020-12-07 PROCEDURE — 97022 WHIRLPOOL THERAPY: CPT | Performed by: OCCUPATIONAL THERAPIST

## 2020-12-07 PROCEDURE — 97140 MANUAL THERAPY 1/> REGIONS: CPT | Performed by: OCCUPATIONAL THERAPIST

## 2020-12-07 PROCEDURE — 97110 THERAPEUTIC EXERCISES: CPT | Performed by: OCCUPATIONAL THERAPIST

## 2020-12-07 NOTE — PROGRESS NOTES
[address]  [phone]    Date:  2020     Initial Evaluation Date: 8/3/20                                  Evaluating Therapist: Avila Benjamin     Patient Name:  [facility])                   :  1955     Restrictions/Precautions:  none, low fall risk  Diagnosis:  Swelling R hand /CTR R                                                  Insurance/Certification information:  Raya Hodgson, Medicare  Referring Practitioner:  Dr. Rinku Damon  Referring Practitioner specific orders: eval and treat. Edema control, etc, rom. New orders 20 cont hand program. Pt. to see PT. for shoulder. Date of Surgery/Injury: CTR 7/15/20  Plan of care signed (Y/N):  No  Visit# / total visits: +12=30+18=48     Pain Level: moderate, aching    Subjective: Pt. States, \"my hand and arm are feeling stronger. Improved muscle in my forearm is noted. Randolph Luevano \"     Objective:  Updated POC to be completed by 10th visit. Last reassessment 20  INTERVENTION: COMPLETED: SPECIFICS/COMMENTS:   Modality:     fluido x 15 min - To promote tissue healing, skin desensitization, reduce inflammation, and decrease pain. Actively completed SROM in all available planes with holds at end range during treatment       Paraffin  To wrist digits    AROM:     Wrist all planes  Increase wrist rom   Pendulums  2# wt swing all directions  Added to HEP   Bean grasp x 15 min   Tendon glides  15x2   Shoulder int/ext rotation stretches  15x2, for nerve gliding and tightness rotator cuff. Reduce overall pain shoulder/hand   Place and Holds x 12 reps in full fist, 8 reps in hook fist. Increased ROM noted. Fist Release and Place x Followed after place and hold, pt to open digits in extension and quickly place digits back into full flexion in attempts to achieve same motion with place and hold.     Fist Water Bead Richard [employer] Richard x Water bead ball placed in palm of hand, digits flexed and wrapped with co-band to increase flexion. Pt to complete pumps with focus on digging DIPs and PIPs into ball.  reps. Finger flex/ext     AAROM:     Shoulder flexion/ex x Wall pulley all directions  Added to HEP        PROM/Stretching:     Wrist /fingers as erin x 15 min Focus on full fist and hook fist. ^'d aggressivenses as tolerated. Putty Fist Rolls x 15 reps fist rolling in putty to loosen digits prior to PROM   Scar Mass/Edema Control:     Soft tissue mobilitzation x 10 min to increase tissue mobiltiy ,. Increase scar mobility        Strengthening:     3# wrist ext/flex x 15x2 increase wrist strength. digiflex green x 3 min, finger mobility and stengthening   tan flexbar x 15x2    Putty grasp/pull/pnch/  15 min /improving  strength and pinch    3.3 pound  x 15x2 to improve grasp/   UBE  10 min to increase circulation to help decrease swelling   clothespins  allcolors to increase pinch strength. Other:     fes  For finger flexion   kinesio taping  To reduce swelling and pain. Flexion glove /hep x Ongoing - To increase active finger flexion. Discussed flexion glove, pt feeling as though he is not getting enough pull in PIPs and DIPs. Asked pt to bring glove in for re-adjustments, possible splint fabrication to block MCPs. PT. To carry a 3# weight throughout the day/ and perform table compressions x To perform several times throughout the day secondary possible crps symptoms     . Hep/rice grasp, tendon glides, towel scrunches x Encouraged to perform 5 x daily to improve finger mobiltiy and reduce swelling and stiffness. Contrast baths/ rice heating pad  2 x daily for reduction of swelling. Improved mobility     REAssessment/Comments: Pt is making Good progress toward stated plan of care. Good rein with strengthening cont's.      9/17/20 11/2/20 11/30/20  Pain level 2- 4-5                                                                        1-3  Active wrist ext/flex: 58/45                                                       58/45                                                       60/45  Active opposition increased to P4 level                                No change to P4 level only                 No change  Active  increased to 1/2 range/3/4 ext cont's                 Increased to 2/3-3/4 range                   Cont's 2/3-3/4 range   Edema Description/Circumferential Measurements: Mod swelling                                                                    Mod-mild swelling                                    Mod swelling conts  Dynamometer (setting 2):                              Left: 82#                                                                           85#                                                          100#              Right: 15#                                                                         25#                                                          30#  Pinch Meter:              Lateral: Left= 15# ,Right= 12#                                       R/ 13#                                                        R/14#              Palmar 3 point: Left= 12#, Right= 12#                            R/ 14#                                                       R/ 14#  9 Hole Peg Test:              Left: 20 sec              Right: 31 sec                                                                   28 sec WNL                                              WNL/ goal met     QuickDASH Score: (Scale 100% full disability, 0 no disability):  Decreased from initially 75 percent to 35%                       GOALS (Long term same as Short term):  1) Patient will demonstrate good understanding of home program (exercises/activities/diagnosis/prognosis/goals) with good accuracy.  Goal progressing  2) Patient will demonstrate increased active/passive range of motion of their affected extremity/hand to [facility] for ADL/IADL completion./goal progressing  3) Patient will demonstrate increased /pinch strength of at least 10 / 5 pinch pounds of their affected hand. Slow progress  4) Patient to report decreased pain in their affected hand/upper extremity from 2-5/10 to 0-2/10 or less with resistive functional use. Yvonne Flower progress  5) Increase in fine motor function as evidenced by decreased time to complete 9-hole peg test and/or MRMT test by at least 30 seconds. Goal progressing  6) Patient will be knowledgeable of edema control techniques as evident with decreases from mod to none. No change   7) Patient will report ADL functions same as prior to diagnosis of swelling R hand, CTR/ goal progressing  8) Patient will demonstrate improved functional activity tolerance from assist/mod ind to ind for ADL/IADL completion. Goal progressing  9) Patient will decrease QuickDASH score by 40% for increased participation in daily functional activities.  Goal progressing          -Rehab Potential: Good    -Requires OT Follow Up: Yes  Time In: 10:00 am            Time Out: 11:00 am           Treatment Charges: Mins Units   Modalities/fluido 15 1   Ther Exercise 20 2   Manual Therapy 25 1   Thera Activities     ADL/Home Mgt      Neuro Re-education     Gait Training     Group Therapy     Non-Billable Service Time     Other     Total Time/Units 60 4   Frequency/Duration 2-3 / week for [id number].   Certification period From: present  To: 1/30/20     I have reviewed the Plan of Care established for skilled therapy services and certify that the services are required and that they will be provided while the patient is under my care.     Physician's Comments/Revisions:                   Physicians's Printed Name:  Dr. Cristal Salinas   Physician's Signature:                                                               [facility]:        Plan:   [x] Continues Plan of care: Treatment covered based on POC and graduated to patient's progress. Pt education continues at each visit to obtain maximum benefits from skilled OT intervention. []  Alter Plan of care:   []  Discharge:      Ghada Veronica OT/L, [address] [place], [place] [id number]    COVID-19 Screening completed upon entering facility and patient cleared for treatment today. Pt followed all protocols set forth by [facility] for Outpatient services. Patient has been made aware of all new hygiene protocols due to COVID-19 set forth by [facility] Outpatient services and agrees to abide by all protocols.

## 2020-12-08 ENCOUNTER — HOSPITAL ENCOUNTER (OUTPATIENT)
Dept: PHYSICAL THERAPY | Age: 65
Setting detail: THERAPIES SERIES
Discharge: HOME | End: 2020-12-08
Payer: MEDICARE

## 2020-12-08 PROCEDURE — 97140 MANUAL THERAPY 1/> REGIONS: CPT

## 2020-12-08 PROCEDURE — 97110 THERAPEUTIC EXERCISES: CPT

## 2020-12-08 NOTE — PROGRESS NOTES
[address] [facility]                Phone: [phone]   Fax: [phone]    Physical Therapy Daily Treatment Note  Date:  2020    Patient Name:  José Parrish    :  1955  MRN: [id number]    Referring Physician:  Trenton Faustin  Insurance Information:  Devon Connelly      Evaluation date:  20  Diagnosis:  R shoulder pain, adhesive capsulitis   PMH:  R shoulder rotator cuff repair on 20 by Dr. Heaven Dave, R carpal tunnel release    Evaluating Physical Therapist:  Candi Kent DPT   Plan of care signed (Y/N):    Visit# / total visits: -     Follow-up:  20 with Dr. Abe Flores     Subjective:   Pt reports minimal soreness today. No other complaints.       Exercises:     Exercise/Equipment Reps  During/ after  Other comments          NT Manual R shoulder extension stretch  10x Supine      NT Manual IR stretch with PT overpressure  3x1 min    Increased AROM R shoulder IR on retest ( across back to L back pocket)  Increased IR to L1 midline     Manual R shoulder PROM and stretching by PT  35 min  Flexion  Abduction   ER   IR   Shoulder flexion, abduction, IR, ER  Tack and stretch with scapula/ axilla/  to improve tissue length and R shoulder ROM (lat/ pec stretches)  Working on combined ER and flexion movement patterns   Focus on 1 min isometric holds at end range today    NT Soft tissue friction massage with edge of reflex hammer  10 min  Pt positioned with R shoulder in passive flexion and L lateral bend to increase stretch on R lat   R pec and R axilla/ lat insertion   R mid/ upper trap   R scapula   Mild petichii noted to R/ mid/ upper traps and axilla following treatment   Minimal redness noted to skin over pec following treatment   Pt reports that shoulder ROM feels better following treatment    NT Table slides  Flexion 15  Abduction 15  ER 15       Pulleys  5 min flexion   5 min abduction      NT Wall ladder  5x5 sec hold at top with slight lean forward             HEP Cervical retraction progression           Home Exercise Program:    Cervical retractions (added self over pressure on 11/5/20)  Pulleys for shoulder PROM   Table slides (flexion, abduction, ER)       Comments:  Pt tolerated PROM and stretching well today  Increased time spent at end range today with flexion, ER, IR  Shoulder ROM continues to show gradual and steady improvement           Treatment/Activity Tolerance:  [x] Patient tolerated treatment well [] Patient limited by fatique  [] Patient limited by pain  [] Patient limited by other medical complications  [] Other:     Prognosis: [x] Good [x] Fair  [] Poor    Patient Requires Follow-up: [x] Yes  [] No    Plan:   [x] Continue per plan of care [] Alter current plan (see comments)  [] Plan of care initiated [] Hold pending MD visit [] Discharge    Plan for Next Session:    Continue to work on R shoulder PROM/ AROM as tolerated   Continue to work more on R shoulder IR stretching with belt to improve ROM    Soft tissue friction massage to R lat and pec insertions       See Weekly Progress Note: []  Yes  [x]  No  Next due:          CPT codes 6/2/2020 Units    Low Complexity PT evaluation 15245 98206    Moderate Complexity PT evaluation  19266    High Complexity PT evaluation 74032    PT Re-evaluation  [address] [facility]    Gait training 60632    Manual therapy  50269 2   Therapeutic activities  97324    Therapeutic exercises  96878 1   Neuromuscular reeducation  [id number]        Time In: 1000  Time Out: [address] [place] [address]    Electronically signed by:    Genny Denton DPT  [id number]

## 2020-12-10 ENCOUNTER — HOSPITAL ENCOUNTER (OUTPATIENT)
Dept: PHYSICAL THERAPY | Age: 65
Setting detail: THERAPIES SERIES
Discharge: HOME | End: 2020-12-10
Payer: MEDICARE

## 2020-12-10 ENCOUNTER — HOSPITAL ENCOUNTER (OUTPATIENT)
Dept: OCCUPATIONAL THERAPY | Age: 65
Setting detail: THERAPIES SERIES
Discharge: HOME | End: 2020-12-10
Payer: MEDICARE

## 2020-12-10 PROCEDURE — 97022 WHIRLPOOL THERAPY: CPT | Performed by: OCCUPATIONAL THERAPIST

## 2020-12-10 PROCEDURE — 97110 THERAPEUTIC EXERCISES: CPT | Performed by: OCCUPATIONAL THERAPIST

## 2020-12-10 PROCEDURE — 97140 MANUAL THERAPY 1/> REGIONS: CPT | Performed by: OCCUPATIONAL THERAPIST

## 2020-12-10 PROCEDURE — 97110 THERAPEUTIC EXERCISES: CPT

## 2020-12-10 PROCEDURE — 97140 MANUAL THERAPY 1/> REGIONS: CPT

## 2020-12-10 NOTE — PROGRESS NOTES
[address]  [phone]    Date:  12/10/2020     Initial Evaluation Date: 8/3/20                                  Evaluating Therapist: Avila Benjamin     Patient Name:  [facility])                   :  1955     Restrictions/Precautions:  none, low fall risk  Diagnosis:  Swelling R hand /CTR R                                                  Insurance/Certification information:  Raya Hodgson, Medicare  Referring Practitioner:  Dr. Rinku Damon  Referring Practitioner specific orders: eval and treat. Edema control, etc, rom. New orders 20 cont hand program. Pt. to see PT. for shoulder. Date of Surgery/Injury: CTR 7/15/20  Plan of care signed (Y/N):  No  Visit# / total visits: +12=30+18=48     Pain Level: moderate, aching    Subjective: Pt. States, \"my hand is finally closing more. I can touch after stretching. \"     Objective:  Updated POC to be completed by 10th visit. Last reassessment 20  INTERVENTION: COMPLETED: SPECIFICS/COMMENTS:   Modality:     fluido x 15 min - To promote tissue healing, skin desensitization, reduce inflammation, and decrease pain. Actively completed SROM in all available planes with holds at end range during treatment       Paraffin  To wrist digits    AROM:     Wrist all planes  Increase wrist rom   Pendulums  2# wt swing all directions  Added to HEP   Bean grasp x 15 min   Tendon glides  15x2   Shoulder int/ext rotation stretches  15x2, for nerve gliding and tightness rotator cuff. Reduce overall pain shoulder/hand   Place and Holds x 12 reps in full fist, 8 reps in hook fist. Increased ROM noted. Fist Release and Place x Followed after place and hold, pt to open digits in extension and quickly place digits back into full flexion in attempts to achieve same motion with place and hold. Fist Water Bead [employer] x Water bead ball placed in palm of hand, digits flexed and wrapped with co-band to increase flexion.  Pt to complete pumps with focus on digging DIPs and PIPs into ball.  reps. Finger flex/ext     AAROM:     Shoulder flexion/ex x Wall pulley all directions  Added to HEP        PROM/Stretching:     Wrist /fingers as erin x 15 min Focus on full fist and hook fist. ^'d aggressivenses as tolerated. Putty Fist Rolls x 15 reps fist rolling in putty to loosen digits prior to PROM   Scar Mass/Edema Control:     Soft tissue mobilitzation x 10 min to increase tissue mobiltiy ,. Increase scar mobility        Strengthening:     3# wrist ext/flex x 15x2 increase wrist strength. digiflex green x 3 min, finger mobility and stengthening   tan flexbar x 15x2    Putty grasp/pull/pnch/  15 min /improving  strength and pinch    3.3 pound  x 15x2 to improve grasp/   UBE  10 min to increase circulation to help decrease swelling   clothespins  allcolors to increase pinch strength. Other:     fes  For finger flexion   kinesio taping  To reduce swelling and pain. Flexion glove /hep x Ongoing - To increase active finger flexion. Discussed flexion glove, pt feeling as though he is not getting enough pull in PIPs and DIPs. Asked pt to bring glove in for re-adjustments, possible splint fabrication to block MCPs. PT. To carry a 3# weight throughout the day/ and perform table compressions x To perform several times throughout the day secondary possible crps symptoms     . Hep/rice grasp, tendon glides, towel scrunches x Encouraged to perform 5 x daily to improve finger mobiltiy and reduce swelling and stiffness. Contrast baths/ rice heating pad  2 x daily for reduction of swelling. Improved mobility     REAssessment/Comments: Pt is making Good progress toward stated plan of care. Good erin with strengthening cont's. Pt. Can hold fingers touching after stretching for the first time. Fingers stiffen to 2/3 active range soon after however, but progress cont's.  Min shakiness noted with strengthening and increased muscle mass noted volar forearm. 9/17/20 11/2/20 11/30/20  Pain level 2- 4-5                                                                        1-3  Active wrist ext/flex: 58/45                                                       58/45                                                       60/45  Active opposition increased to P4 level                                No change to P4 level only                 No change  Active  increased to 1/2 range/3/4 ext cont's                 Increased to 2/3-3/4 range                   Cont's 2/3-3/4 range   Edema Description/Circumferential Measurements:               Mod swelling                                                                    Mod-mild swelling                                    Mod swelling conts  Dynamometer (setting 2):                              Left: 82#                                                                           85#                                                          100#              Right: 15#                                                                         25#                                                          30#  Pinch Meter:              Lateral: Left= 15# ,Right= 12#                                       R/ 13#                                                        R/14#              Palmar 3 point: Left= 12#, Right= 12#                            R/ 14#                                                       R/ 14#  9 Hole Peg Test:              Left: 20 sec              Right: 31 sec                                                                   28 sec WNL                                              WNL/ goal met     QuickDASH Score: (Scale 100% full disability, 0 no disability):  Decreased from initially 75 percent to 35%                       GOALS (Long term same as Short term):  1) Patient will demonstrate good understanding of home program (exercises/activities/diagnosis/prognosis/goals) with good accuracy. Goal progressing  2) Patient will demonstrate increased active/passive range of motion of their affected extremity/hand to [facility] for ADL/IADL completion./goal progressing  3) Patient will demonstrate increased /pinch strength of at least 10 / 5 pinch pounds of their affected hand. Slow progress  4) Patient to report decreased pain in their affected hand/upper extremity from 2-5/10 to 0-2/10 or less with resistive functional use. Sina Andie progress  5) Increase in fine motor function as evidenced by decreased time to complete 9-hole peg test and/or MRMT test by at least 30 seconds. Goal progressing  6) Patient will be knowledgeable of edema control techniques as evident with decreases from mod to none. No change   7) Patient will report ADL functions same as prior to diagnosis of swelling R hand, CTR/ goal progressing  8) Patient will demonstrate improved functional activity tolerance from assist/mod ind to ind for ADL/IADL completion. Goal progressing  9) Patient will decrease QuickDASH score by 40% for increased participation in daily functional activities.  Goal progressing          -Rehab Potential: Good    -Requires OT Follow Up: Yes  Time In: 10:00 am            Time Out: 11:00 am           Treatment Charges: Mins Units   Modalities/fluido 15 1   Ther Exercise 20 2   Manual Therapy 25 1   Thera Activities     ADL/Home Mgt      Neuro Re-education     Gait Training     Group Therapy     Non-Billable Service Time     Other     Total Time/Units 60 4   Frequency/Duration 2-3 / week for [id number].   Certification period From: present  To: 1/30/20     I have reviewed the Plan of Care established for skilled therapy services and certify that the services are required and that they will be provided while the patient is under my care.     Physician's Comments/Revisions:                   Physicians's Printed Name:  Dr. Don Wagner   Physician's Signature:                                                               WFWF:        Plan:   [x]  Continues Plan of care: Treatment covered based on POC and graduated to patient's progress. Pt education continues at each visit to obtain maximum benefits from skilled OT intervention. []  Alter Plan of care:   []  Discharge:      Mayte Hoang OT/L, [address] [place] [address]    COVID-19 Screening completed upon entering facility and patient cleared for treatment today. Pt followed all protocols set forth by [facility] for Outpatient services. Patient has been made aware of all new hygiene protocols due to COVID-19 set forth by [facility] Outpatient services and agrees to abide by all protocols.

## 2020-12-10 NOTE — PROGRESS NOTES
5 min abduction      NT Wall ladder  5x5 sec hold at top with slight lean forward             HEP Cervical retraction progression           Home Exercise Program:    Cervical retractions (added self over pressure on 11/5/20)  Pulleys for shoulder PROM   Table slides (flexion, abduction, ER)       Comments:  Pt tolerated PROM and stretching well today  R shoulder PROM   Flexion:  170 ° with humerus touching side of head    ER in 90/90 position supine:  70 °    IR midline T12 and also able to reach across back to midline of L pant pocket      Pt progressing slowly but steadily with R shoulder ROM   Performed some side lying R shoulder stabilizations against varying manual resistance from PT          Treatment/Activity Tolerance:  [x] Patient tolerated treatment well [] Patient limited by fatique  [] Patient limited by pain  [] Patient limited by other medical complications  [] Other:     Prognosis: [x] Good [x] Fair  [] Poor    Patient Requires Follow-up: [x] Yes  [] No    Plan:   [x] Continue per plan of care [] Alter current plan (see comments)  [] Plan of care initiated [] Hold pending MD visit [] Discharge    Plan for Next Session:    Pt to call with update after seeing Dr. Yolanda Rodriguez on 12/11/20       See Weekly Progress Note: []  Yes  [x]  No  Next due:          CPT codes 6/2/2020 Units    Low Complexity PT evaluation 90534 [id number]    Moderate Complexity PT evaluation  06636    High Complexity PT evaluation 23877    PT Re-evaluation  35627    Gait training 40310    Manual therapy  [phone] 3   Therapeutic activities  40621    Therapeutic exercises  08548 1   Neuromuscular reeducation  21162        Time In: 0900  Time Out: 1000    Electronically signed by:    Clement Glasgow DPT  [id number]

## 2020-12-14 ENCOUNTER — HOSPITAL ENCOUNTER (OUTPATIENT)
Dept: PHYSICAL THERAPY | Age: 65
Setting detail: THERAPIES SERIES
Discharge: HOME | End: 2020-12-14
Payer: MEDICARE

## 2020-12-14 ENCOUNTER — HOSPITAL ENCOUNTER (OUTPATIENT)
Dept: OCCUPATIONAL THERAPY | Age: 65
Setting detail: THERAPIES SERIES
Discharge: HOME | End: 2020-12-14
Payer: MEDICARE

## 2020-12-14 PROCEDURE — 97110 THERAPEUTIC EXERCISES: CPT | Performed by: OCCUPATIONAL THERAPIST

## 2020-12-14 PROCEDURE — 97022 WHIRLPOOL THERAPY: CPT | Performed by: OCCUPATIONAL THERAPIST

## 2020-12-14 PROCEDURE — 97140 MANUAL THERAPY 1/> REGIONS: CPT | Performed by: OCCUPATIONAL THERAPIST

## 2020-12-14 NOTE — PROGRESS NOTES
[address] [facility]                Phone: [phone]   Fax: [phone]    To: Dr. Mechelle Sosa, Dr. Prabhjot Amato        From: Verena Hadley     Patient: Deidra Saxena       : 1955  Diagnosis: R shoulder pain, adhesive capsulitis      Date: 2020    PMH:  R shoulder rotator cuff repair on 20 by Dr. Matthew Payne  Discharge Note    Total Visits to date:   25 Cancels/No-shows to date:      Plan of Care/Treatment to date:  [x] Therapeutic Exercise    [] Modalities:  [x] Therapeutic Activity     [] Ultrasound   Electrical Stimulation  [] Gait Training      [] Cervical Traction   [] Lumbar Traction  [] Neuromuscular Re-education  [] Cold/hotpack [] Iontophoresis  [x] Instruction in HEP      Other:  [x] Manual Therapy       []    [x] Postural education        []                    ? Significant Findings At Last Visit/Comments:    Pt with no complaints prior to session today. Has follow-up with Dr. Prabhjot Amato tomorrow on 20. Pt tolerated PROM and stretching well today  R shoulder PROM              Flexion:  170 ° with humerus touching side of head               ER in 90/90 position supine:  70 °               IR midline T12 and also able to reach across back to midline of L pant pocket                 Pt progressing slowly but steadily with R shoulder ROM   Performed some side lying R shoulder stabilizations against varying manual resistance from PT     Pt had follow-up with Dr. Prabhjot Amato and reports that Dr. Prabhjot Amato was pleased with pt's ROM. He reports that he does not have to continue with formal PT at this time. Dr. Prabhjot Amato did mention to continue with R shoulder IR stretching to continue to work on this ROM. Pt was provided with hardcopy of R shoulder IR belt stretch to continue with for HEP on 20. He verbalized and demonstrated understanding. Pt was a pleasure to work with and made excellent progress toward established PT goals.       Progress towards goals: 1.  Pt will report 0/10 R side neck and RUE pain with daily activities- goal met                            2.  Pt will improve sitting/ standing posture from FAIR to GOOD- goal met                           3.  Pt will increase R shoulder AROM to [facility] in all directions- excellent progress toward goal (still lacking IR AROM)                           4.  Pt will increase cervical spine AROM to [facility] in all directions with no movement loss- goal met                            5.  Pt will increase R shoulder strength to 5/5 throughout- good progress toward established goals                            6.  Pt will be independent with HEP- goal met         Rehab Potential: [] Excellent [x] Good [] Fair  [] Poor     Goal Status:  [] Achieved [x] Partially Achieved  [] Not Achieved     Patient Status: [] Continue per initial plan of Care     [x] Patient now discharged with HEP to continue to work on R shoulder IR ROM      [] Additional visits requested, Please re-certify for additional visits:      Electronically signed by:   Jagdish Auguste DPT  [id number]    If you have any questions or concerns, please don't hesitate to call.   Thank you for your referral.    Physician Signature:________________________________Date:__________________  By signing above, therapists plan is approved by physician

## 2020-12-14 NOTE — PROGRESS NOTES
[address]  [phone]    Date:  2020     Initial Evaluation Date: 8/3/20                                  Evaluating Therapist: Vika Scott     Patient Name:  [facility] Betancourt [facility])                   :  1955     Restrictions/Precautions:  none, low fall risk  Diagnosis:  Swelling R hand /CTR R                                                  Insurance/Certification information:  [place], Medicare  Referring Practitioner:  Dr. Leonela Carrera  Referring Practitioner specific orders: eval and treat. Edema control, etc, rom. New orders 20 cont hand program. Pt. to see PT. for shoulder. Date of Surgery/Injury: CTR 7/15/20  Plan of care signed (Y/N):  No  Visit# / total visits: +12=30+18=48     Pain Level: moderate, aching    Subjective: Pt. States, \"my doctor cleared me from PT for my shoulder but thinks I should cont hand therapy for crps syndrome. \"     Objective:  Updated POC to be completed by 10th visit. Last reassessment 20  INTERVENTION: COMPLETED: SPECIFICS/COMMENTS:   Modality:     fluido x 15 min - To promote tissue healing, skin desensitization, reduce inflammation, and decrease pain. Actively completed SROM in all available planes with holds at end range during treatment       Paraffin  To wrist digits    AROM:     Wrist all planes  Increase wrist rom   Pendulums  2# wt swing all directions  Added to HEP   Bean grasp x 15 min   Tendon glides  15x2   Shoulder int/ext rotation stretches  15x2, for nerve gliding and tightness rotator cuff. Reduce overall pain shoulder/hand   Place and Holds x 12 reps in full fist, 8 reps in hook fist. Increased ROM noted. Fist Release and Place x Followed after place and hold, pt to open digits in extension and quickly place digits back into full flexion in attempts to achieve same motion with place and hold. Fist Water Bead Proton Therapy & Proton Therapy x Water bead ball placed in palm of hand, digits flexed and wrapped with co-band to increase flexion. Pt to complete pumps with focus on digging DIPs and PIPs into ball.  reps. Finger flex/ext     AAROM:     Shoulder flexion/ex x Wall pulley all directions  Added to HEP        PROM/Stretching:     Wrist /fingers as erin x 15 min Focus on full fist and hook fist. ^'d aggressivenses as tolerated. Putty Fist Rolls x 15 reps fist rolling in putty to loosen digits prior to PROM   Scar Mass/Edema Control:     Soft tissue mobilitzation x 10 min to increase tissue mobiltiy ,. Increase scar mobility        Strengthening:     3# wrist ext/flex x 15x2 increase wrist strength. digiflex green x 3 min, finger mobility and stengthening   tan flexbar x 15x2    Putty grasp/pull/pnch/  15 min /improving  strength and pinch    3.3 pound  x 15x2 to improve grasp/   UBE  10 min to increase circulation to help decrease swelling   clothespins  allcolors to increase pinch strength. Other:     fes  For finger flexion   kinesio taping  To reduce swelling and pain. Flexion glove /hep x Ongoing - To increase active finger flexion. Discussed flexion glove, pt feeling as though he is not getting enough pull in PIPs and DIPs. Asked pt to bring glove in for re-adjustments, possible splint fabrication to block MCPs. PT. To carry a 3# weight throughout the day/ and perform table compressions x To perform several times throughout the day secondary possible crps symptoms     . Hep/rice grasp, tendon glides, towel scrunches x Encouraged to perform 5 x daily to improve finger mobiltiy and reduce swelling and stiffness. Contrast baths/ rice heating pad  2 x daily for reduction of swelling.  Improved mobility REAssessment/Comments: Pt is making Good progress toward stated plan of care. . Pt. Stated he needs to do more at home because he is worried his hand will be like this for life. PT. Encouraged to carry a 3 # weight at all times to decrease crps symptoms. Pt. To cont with home stretches and splinting. 9/17/20 11/2/20 11/30/20  Pain level 2- 4-5                                                                        1-3  Active wrist ext/flex: 58/45                                                       58/45                                                       60/45  Active opposition increased to P4 level                                No change to P4 level only                 No change  Active  increased to 1/2 range/3/4 ext cont's                 Increased to 2/3-3/4 range                   Cont's 2/3-3/4 range   Edema Description/Circumferential Measurements:               Mod swelling                                                                    Mod-mild swelling                                    Mod swelling conts  Dynamometer (setting 2):                              Left: 82#                                                                           85#                                                          100#              Right: 15#                                                                         25#                                                          30#  Pinch Meter:              Lateral: Left= 15# ,Right= 12#                                       R/ 13#                                                        R/14#              Palmar 3 point: Left= 12#, Right= 12#                            R/ 14#                                                       R/ 14#  9 Hole Peg Test:              Left: 20 sec Frequency/Duration 2-3 / week for [id number].   Certification period From: present  To: 1/30/20     I have reviewed the Plan of Care established for skilled therapy services and certify that the services are required and that they will be provided while the patient is under my care.     Physician's Comments/Revisions:                   Physicians's Printed Name:  Dr. Dwain Schaumann   Physician's Signature:                                                               WWJI:        Plan:   [x]  Continues Plan of care: Treatment covered based on POC and graduated to patient's progress. Pt education continues at each visit to obtain maximum benefits from skilled OT intervention. []  Alter Plan of care:   []  Discharge:      Ghada Veronica OT/L, [address] [place] [address]    COVID-19 Screening completed upon entering facility and patient cleared for treatment today. Pt followed all protocols set forth [id number] for [facility] services. Patient has been made aware of all new hygiene protocols due to COVID-19 set forth by [id number] Out[facility] services and agrees to abide by all protocols.

## 2020-12-15 ENCOUNTER — HOSPITAL ENCOUNTER (OUTPATIENT)
Dept: PHYSICAL THERAPY | Age: 65
Setting detail: THERAPIES SERIES
End: 2020-12-15
Payer: MEDICARE

## 2020-12-17 ENCOUNTER — APPOINTMENT (OUTPATIENT)
Dept: PHYSICAL THERAPY | Age: 65
End: 2020-12-17
Payer: MEDICARE

## 2020-12-17 ENCOUNTER — HOSPITAL ENCOUNTER (OUTPATIENT)
Dept: OCCUPATIONAL THERAPY | Age: 65
Setting detail: THERAPIES SERIES
Discharge: HOME | End: 2020-12-17
Payer: MEDICARE

## 2020-12-17 PROCEDURE — 97110 THERAPEUTIC EXERCISES: CPT | Performed by: OCCUPATIONAL THERAPIST

## 2020-12-17 PROCEDURE — 97022 WHIRLPOOL THERAPY: CPT | Performed by: OCCUPATIONAL THERAPIST

## 2020-12-17 PROCEDURE — 97140 MANUAL THERAPY 1/> REGIONS: CPT | Performed by: OCCUPATIONAL THERAPIST

## 2020-12-17 NOTE — PROGRESS NOTES
Fist Water Bead "[employer] & "[employer] x Water bead ball placed in palm of hand, digits flexed and wrapped with co-band to increase flexion. Pt to complete pumps with focus on digging DIPs and PIPs into ball.  reps. Finger flex/ext     AAROM:     Shoulder flexion/ex x Wall pulley all directions  Added to HEP        PROM/Stretching:     Wrist /fingers as erin x 15 min Focus on full fist and hook fist. ^'d aggressivenses as tolerated. Putty Fist Rolls x 15 reps fist rolling in putty to loosen digits prior to PROM   Scar Mass/Edema Control:     Soft tissue mobilitzation x 10 min to increase tissue mobiltiy ,. Increase scar mobility        Strengthening:     3# wrist ext/flex x 15x2 increase wrist strength. digiflex green x 3 min, finger mobility and stengthening   tan flexbar x 15x2    Putty grasp/pull/pnch/  15 min /improving  strength and pinch    3.3 pound  x 15x2 to improve grasp/   UBE  10 min to increase circulation to help decrease swelling   clothespins  allcolors to increase pinch strength. Other:     fes  For finger flexion   kinesio taping  To reduce swelling and pain. Flexion glove /hep x Ongoing - To increase active finger flexion. Discussed flexion glove, pt feeling as though he is not getting enough pull in PIPs and DIPs. Asked pt to bring glove in for re-adjustments, possible splint fabrication to block MCPs. PT. To carry a 3# weight throughout the day/ and perform table compressions x To perform several times throughout the day secondary possible crps symptoms     . Hep/rice grasp, tendon glides, towel scrunches x Encouraged to perform 5 x daily to improve finger mobiltiy and reduce swelling and stiffness. Contrast baths/ rice heating pad  2 x daily for reduction of swelling.  Improved mobility Right: 31 sec                                                                   28 sec WNL                                              WNL/ goal met     QuickDASH Score: (Scale 100% full disability, 0 no disability):  Decreased from initially 75 percent to 35%                       GOALS (Long term same as Short term):  1) Patient will demonstrate good understanding of home program (exercises/activities/diagnosis/prognosis/goals) with good accuracy. Goal progressing  2) Patient will demonstrate increased active/passive range of motion of their affected extremity/hand to [facility] for ADL/IADL completion./goal progressing  3) Patient will demonstrate increased /pinch strength of at least 10 / 5 pinch pounds of their affected hand. Slow progress  4) Patient to report decreased pain in their affected hand/upper extremity from 2-5/10 to 0-2/10 or less with resistive functional use. Merlene Hum progress  5) Increase in fine motor function as evidenced by decreased time to complete 9-hole peg test and/or MRMT test by at least 30 seconds. Goal progressing  6) Patient will be knowledgeable of edema control techniques as evident with decreases from mod to none. No change   7) Patient will report ADL functions same as prior to diagnosis of swelling R hand, CTR/ goal progressing  8) Patient will demonstrate improved functional activity tolerance from assist/mod ind to ind for ADL/IADL completion. Goal progressing  9) Patient will decrease QuickDASH score by 40% for increased participation in daily functional activities.  Goal progressing          -Rehab Potential: Good    -Requires OT Follow Up: Yes  Time In: 10:00 am            Time Out: 11:00 am           Treatment Charges: Mins Units   Modalities/fluido 15 1   Ther Exercise 20 2   Manual Therapy 25 1   Thera Activities     ADL/Home Mgt      Neuro Re-education     Gait Training     Group Therapy     Non-Billable Service Time     Other     Total Time/Units 60 4 Frequency/Duration 2-3 / week for 04DBDLAI.   Certification period From: present  To: 1/30/20     I have reviewed the Plan of Care established for skilled therapy services and certify that the services are required and that they will be provided while the patient is under my care.     Physician's Comments/Revisions:                   Physicians's Printed Name:  Dr. Zaida Allen   Physician's Signature:                                                               CNZY:        Plan:   [x]  Continues Plan of care: Treatment covered based on POC and graduated to patient's progress. Pt education continues at each visit to obtain maximum benefits from skilled OT intervention. []  Alter Plan of care:   []  Discharge:      Saskia Carrington OT/L, [address] [place] [address]    COVID-19 Screening completed upon entering facility and patient cleared for treatment today. Pt followed all protocols set forth by [address] [place] [address],[address] for Outpatient services. Patient has been made aware of all new hygiene protocols due to COVID-19 set forth by [address] [place] [address],[address] Outpatient services and agrees to abide by all protocols.

## 2020-12-21 ENCOUNTER — HOSPITAL ENCOUNTER (OUTPATIENT)
Dept: OCCUPATIONAL THERAPY | Age: 65
Setting detail: THERAPIES SERIES
Discharge: HOME | End: 2020-12-21
Payer: MEDICARE

## 2020-12-21 PROCEDURE — 97110 THERAPEUTIC EXERCISES: CPT | Performed by: OCCUPATIONAL THERAPIST

## 2020-12-21 PROCEDURE — 97140 MANUAL THERAPY 1/> REGIONS: CPT | Performed by: OCCUPATIONAL THERAPIST

## 2020-12-21 PROCEDURE — 97022 WHIRLPOOL THERAPY: CPT | Performed by: OCCUPATIONAL THERAPIST

## 2020-12-21 NOTE — PROGRESS NOTES
[address]  [phone]    Date:  2020     Initial Evaluation Date: 8/3/20                                  Evaluating Therapist: Alma Hickman     Patient Name:  Caroline Ortiz [facility])                   :  1955     Restrictions/Precautions:  none, low fall risk  Diagnosis:  Swelling R hand /CTR R                                                  Insurance/Certification information:  [employer], Medicare  Referring Practitioner:  Dr. Jonathan Caicedo  Referring Practitioner specific orders: eval and treat. Edema control, etc, rom. New orders 20 cont hand program. Pt. to see PT. for shoulder. Date of Surgery/Injury: CTR 7/15/20  Plan of care signed (Y/N):  No  Visit# / total visits: 34/ 18+12=30+18=48     Pain Level: moderate, aching    Subjective: Pt. States, \"good and bad days cont. . \"     Objective:  Updated POC to be completed by 10th visit. Last reassessment 20  INTERVENTION: COMPLETED: SPECIFICS/COMMENTS:   Modality:     fluido x 15 min - To promote tissue healing, skin desensitization, reduce inflammation, and decrease pain. Actively completed SROM in all available planes with holds at end range during treatment       Paraffin  To wrist digits    AROM:     Wrist all planes  Increase wrist rom   Pendulums  2# wt swing all directions  Added to HEP   Mac grasp  15 min   Tendon glides x 15x2   Shoulder int/ext rotation stretches  15x2, for nerve gliding and tightness rotator cuff. Reduce overall pain shoulder/hand   Place and Holds x 12 reps in full fist, 8 reps in hook fist. Increased ROM noted. Fist Release and Place  Followed after place and hold, pt to open digits in extension and quickly place digits back into full flexion in attempts to achieve same motion with place and hold. REAssessment/Comments: Pt is making Good progress toward stated plan of care. . Pt. encouraged to carry a 3 # weight at all times to decrease crps symptoms. Pt. To cont with home stretches and splinting. Dry needling provided to calm CNS and crps symptoms. 9/17/20 11/2/20 11/30/20  Pain level 2- 4-5                                                                        1-3  Active wrist ext/flex: 58/45                                                       58/45                                                       60/45  Active opposition increased to P4 level                                No change to P4 level only                 No change  Active  increased to 1/2 range/3/4 ext cont's                 Increased to 2/3-3/4 range                   Cont's 2/3-3/4 range   Edema Description/Circumferential Measurements:               Mod swelling                                                                    Mod-mild swelling                                    Mod swelling conts  Dynamometer (setting 2):                              Left: 82#                                                                           85#                                                          100#              Right: 15#                                                                         25#                                                          30#  Pinch Meter:              Lateral: Left= 15# ,Right= 12#                                       R/ 13#                                                        R/14#              Palmar 3 point: Left= 12#, Right= 12#                            R/ 14#                                                       R/ 14#  9 Hole Peg Test:              Left: 20 sec Right: 31 sec                                                                   28 sec WNL                                              WNL/ goal met     QuickDASH Score: (Scale 100% full disability, 0 no disability):  Decreased from initially 75 percent to 35%                       GOALS (Long term same as Short term):  1) Patient will demonstrate good understanding of home program (exercises/activities/diagnosis/prognosis/goals) with good accuracy. Goal progressing  2) Patient will demonstrate increased active/passive range of motion of their affected extremity/hand to [facility] for ADL/IADL completion./goal progressing  3) Patient will demonstrate increased /pinch strength of at least 10 / 5 pinch pounds of their affected hand. Slow progress  4) Patient to report decreased pain in their affected hand/upper extremity from 2-5/10 to 0-2/10 or less with resistive functional use. Laura Dario progress  5) Increase in fine motor function as evidenced by decreased time to complete 9-hole peg test and/or MRMT test by at least 30 seconds. Goal progressing  6) Patient will be knowledgeable of edema control techniques as evident with decreases from mod to none. No change   7) Patient will report ADL functions same as prior to diagnosis of swelling R hand, CTR/ goal progressing  8) Patient will demonstrate improved functional activity tolerance from assist/mod ind to ind for ADL/IADL completion. Goal progressing  9) Patient will decrease QuickDASH score by 40% for increased participation in daily functional activities.  Goal progressing          -Rehab Potential: Good    -Requires OT Follow Up: Yes  Time In: 10:00 am            Time Out: 11:00 am           Treatment Charges: Mins Units   Modalities/fluido 15 1   Ther Exercise 20 2   Manual Therapy 25 1   Thera Activities     ADL/Home Mgt      Neuro Re-education     Gait Training     Group Therapy     Non-Billable Service Time     Other     Total Time/Units 60 4 Frequency/Duration 2-3 / week for 29VBWXRD.   Certification period From: present  To: 1/30/20     I have reviewed the Plan of Care established for skilled therapy services and certify that the services are required and that they will be provided while the patient is under my care.     Physician's Comments/Revisions:                   Physicians's Printed Name:  Dr. Dre Tony   Physician's Signature:                                                               LQND:        Plan:   [x]  Continues Plan of care: Treatment covered based on POC and graduated to patient's progress. Pt education continues at each visit to obtain maximum benefits from skilled OT intervention. []  Alter Plan of care:   []  Discharge:      Francisco Javier León OT/L, [address] [place] [address]    COVID-19 Screening completed upon entering facility and patient cleared for treatment today. Pt followed all protocols set forth by [facility] for Outpatient services. Patient has been made aware of all new hygiene protocols due to COVID-19 set forth by [facility] Outpatient services and agrees to abide by all protocols.

## 2020-12-28 ENCOUNTER — HOSPITAL ENCOUNTER (OUTPATIENT)
Dept: OCCUPATIONAL THERAPY | Age: 65
Setting detail: THERAPIES SERIES
Discharge: HOME | End: 2020-12-28
Payer: MEDICARE

## 2020-12-28 PROCEDURE — 97140 MANUAL THERAPY 1/> REGIONS: CPT | Performed by: OCCUPATIONAL THERAPIST

## 2020-12-28 PROCEDURE — 97022 WHIRLPOOL THERAPY: CPT | Performed by: OCCUPATIONAL THERAPIST

## 2020-12-28 PROCEDURE — 97110 THERAPEUTIC EXERCISES: CPT | Performed by: OCCUPATIONAL THERAPIST

## 2020-12-28 NOTE — PROGRESS NOTES
[address]  [phone]    Date:  2020     Initial Evaluation Date: 8/3/20                                  Evaluating Therapist: Mark Munroe     Patient Name:  [facility])                   :  1955     Restrictions/Precautions:  none, low fall risk  Diagnosis:  Swelling R hand /CTR R                                                  Insurance/Certification information:  [employer], Medicare  Referring Practitioner:  Dr. Mireille Aguayo  Referring Practitioner specific orders: eval and treat. Edema control, etc, rom. New orders 20 cont hand program. Pt. to see PT. for shoulder. Date of Surgery/Injury: CTR 7/15/20  Plan of care signed (Y/N):  No  Visit# / total visits: 35/ 18+12=30+18=48     Pain Level: moderate, aching    Subjective: Pt. States, \"Wore my flexion glove this morning and it helped close my fingers. Doing more therapy at home \"     Objective:  Updated POC to be completed by 10th visit. Last reassessment 20  INTERVENTION: COMPLETED: SPECIFICS/COMMENTS:   Modality:     fluido x 15 min - To promote tissue healing, skin desensitization, reduce inflammation, and decrease pain. Actively completed SROM in all available planes with holds at end range during treatment       Paraffin  To wrist digits    AROM:     Wrist all planes  Increase wrist rom   Pendulums  2# wt swing all directions  Added to HEP   Mac grasp  15 min   Tendon glides x 15x2   Shoulder int/ext rotation stretches  15x2, for nerve gliding and tightness rotator cuff. Reduce overall pain shoulder/hand   Place and Holds x 12 reps in full fist, 8 reps in hook fist. Increased ROM noted. Fist Release and Place  Followed after place and hold, pt to open digits in extension and quickly place digits back into full flexion in attempts to achieve same motion with place and hold. Fist Water Bead [employer] bead ball placed in palm of hand, digits flexed and wrapped with co-band to increase flexion. Pt to complete pumps with focus on digging DIPs and PIPs into ball.  reps. Finger flex/ext     AAROM:     Shoulder flexion/ex  Wall pulley all directions  Added to HEP        PROM/Stretching:     Wrist /fingers as erin x 15 min Focus on full fist and hook fist. ^'d aggressivenses as tolerated. Putty Fist Rolls x 15 reps fist rolling in putty to loosen digits prior to PROM   Scar Mass/Edema Control:     Soft tissue mobilitzation x 10 min to increase tissue mobiltiy ,. Increase scar mobility        Strengthening:     3# wrist ext/flex  15x2 increase wrist strength. digiflex green  3 min, finger mobility and stengthening   tan flexbar  15x2    Putty grasp/pull/pnch/ x 15 min /improving  strength and pinch    3.3 pound  x 15x2 to improve grasp/   UBE  10 min to increase circulation to help decrease swelling   clothespins  allcolors to increase pinch strength. Other:     fes  For finger flexion   kinesio taping  To reduce swelling and pain. Flexion glove /hep x Ongoing - To increase active finger flexion. Discussed flexion glove, pt feeling as though he is not getting enough pull in PIPs and DIPs. Asked pt to bring glove in for re-adjustments, possible splint fabrication to block MCPs. PT. To carry a 3# weight throughout the day/ and perform table compressions x To perform several times throughout the day secondary possible crps symptoms   Dry needling x 20 min//15 accupoints to calm CNS. Hep/rice grasp, tendon glides, towel scrunches x Encouraged to perform 5 x daily to improve finger mobiltiy and reduce swelling and stiffness. Contrast baths/ rice heating pad  2 x daily for reduction of swelling.  Improved mobility REAssessment/Comments: Pt is making Good progress toward stated plan of care. .Improved finger flexion today at the start of therapy. Pt. Working harder at home. Will assess next session. 9/17/20 11/2/20 11/30/20  Pain level 2- 4-5                                                                        1-3  Active wrist ext/flex: 58/45                                                       58/45                                                       60/45  Active opposition increased to P4 level                                No change to P4 level only                 No change  Active  increased to 1/2 range/3/4 ext cont's                 Increased to 2/3-3/4 range                   Cont's 2/3-3/4 range   Edema Description/Circumferential Measurements:               Mod swelling                                                                    Mod-mild swelling                                    Mod swelling conts  Dynamometer (setting 2):                              Left: 82#                                                                           85#                                                          100#              Right: 15#                                                                         25#                                                          30#  Pinch Meter:              Lateral: Left= 15# ,Right= 12#                                       R/ 13#                                                        R/14#              Palmar 3 point: Left= 12#, Right= 12#                            R/ 14#                                                       R/ 14#  9 Hole Peg Test:              Left: 20 sec Right: 31 sec                                                                   28 sec WNL                                              WNL/ goal met     QuickDASH Score: (Scale 100% full disability, 0 no disability):  Decreased from initially 75 percent to 35%                       GOALS (Long term same as Short term):  1) Patient will demonstrate good understanding of home program (exercises/activities/diagnosis/prognosis/goals) with good accuracy. Goal progressing  2) Patient will demonstrate increased active/passive range of motion of their affected extremity/hand to [facility] for ADL/IADL completion./goal progressing  3) Patient will demonstrate increased /pinch strength of at least 10 / 5 pinch pounds of their affected hand. Slow progress  4) Patient to report decreased pain in their affected hand/upper extremity from 2-5/10 to 0-2/10 or less with resistive functional use. Burke Fear progress  5) Increase in fine motor function as evidenced by decreased time to complete 9-hole peg test and/or MRMT test by at least 30 seconds. Goal progressing  6) Patient will be knowledgeable of edema control techniques as evident with decreases from mod to none. No change   7) Patient will report ADL functions same as prior to diagnosis of swelling R hand, CTR/ goal progressing  8) Patient will demonstrate improved functional activity tolerance from assist/mod ind to ind for ADL/IADL completion. Goal progressing  9) Patient will decrease QuickDASH score by 40% for increased participation in daily functional activities.  Goal progressing          -Rehab Potential: Good    -Requires OT Follow Up: Yes  Time In: 10:00 am            Time Out: 11:00 am           Treatment Charges: Mins Units   Modalities/fluido 15 1   Ther Exercise 20 2   Manual Therapy 25 1   Thera Activities     ADL/Home Mgt      Neuro Re-education     Gait Training     Group Therapy     Non-Billable Service Time     Other     Total Time/Units 60 4

## 2020-12-31 ENCOUNTER — HOSPITAL ENCOUNTER (OUTPATIENT)
Dept: OCCUPATIONAL THERAPY | Age: 65
Setting detail: THERAPIES SERIES
Discharge: HOME | End: 2020-12-31
Payer: MEDICARE

## 2020-12-31 PROCEDURE — 97022 WHIRLPOOL THERAPY: CPT | Performed by: OCCUPATIONAL THERAPIST

## 2020-12-31 PROCEDURE — 97110 THERAPEUTIC EXERCISES: CPT | Performed by: OCCUPATIONAL THERAPIST

## 2020-12-31 PROCEDURE — 97140 MANUAL THERAPY 1/> REGIONS: CPT | Performed by: OCCUPATIONAL THERAPIST

## 2020-12-31 NOTE — PROGRESS NOTES
[facility]  [phone]    Date:  2020     Initial Evaluation Date: 8/3/20                                  Evaluating Therapist: Penny Laguna     Patient Name:  [facility])                   :  1955     Restrictions/Precautions:  none, low fall risk  Diagnosis:  Swelling R hand /CTR R                                                  Insurance/Certification information:  [place], Medicare  Referring Practitioner:  Dr. Miguel Rutledge  Referring Practitioner specific orders: eval and treat. Edema control, etc, rom. New orders 20 cont hand program. Pt. to see PT. for shoulder. Date of Surgery/Injury: CTR 7/15/20  Plan of care signed (Y/N):  No  Visit# / total visits: 36/ 18+12=30+18=48     Pain Level: moderate, aching    Subjective: Pt. States, \"working much harder at home and it's paying off with better closure. Objective:  Updated POC to be completed by 10th visit. Last reassessment 20  INTERVENTION: COMPLETED: SPECIFICS/COMMENTS:   Modality:     fluido x 15 min - To promote tissue healing, skin desensitization, reduce inflammation, and decrease pain. Actively completed SROM in all available planes with holds at end range during treatment       Paraffin  To wrist digits    AROM:     Wrist all planes  Increase wrist rom   Pendulums  2# wt swing all directions  Added to HEP   Mac grasp  15 min   Tendon glides x 15x2   Shoulder int/ext rotation stretches  15x2, for nerve gliding and tightness rotator cuff. Reduce overall pain shoulder/hand   Place and Holds x 12 reps in full fist, 8 reps in hook fist. Increased ROM noted. Fist Release and Place  Followed after place and hold, pt to open digits in extension and quickly place digits back into full flexion in attempts to achieve same motion with place and hold. Fist Water Bead [employer] bead ball placed in palm of hand, digits flexed and wrapped with co-band to increase flexion. Pt to complete pumps with focus on digging DIPs and PIPs into ball.  reps. Finger flex/ext     AAROM:     Shoulder flexion/ex  Wall pulley all directions  Added to HEP        PROM/Stretching:     Wrist /fingers as erin x 15 min Focus on full fist and hook fist. ^'d aggressivenses as tolerated. Putty Fist Rolls x 15 reps fist rolling in putty to loosen digits prior to PROM   Scar Mass/Edema Control:     Soft tissue mobilitzation x 10 min to increase tissue mobiltiy ,. Increase scar mobility        Strengthening:     3# wrist ext/flex  15x2 increase wrist strength. digiflex green  3 min, finger mobility and stengthening   tan flexbar  15x2    Putty grasp/pull/pnch/ x 15 min /improving  strength and pinch    3.3 pound  x 15x2 to improve grasp/   UBE  10 min to increase circulation to help decrease swelling   clothespins  allcolors to increase pinch strength. Other:     fes  For finger flexion   kinesio taping  To reduce swelling and pain. Flexion glove /hep x Ongoing - To increase active finger flexion. Discussed flexion glove, pt feeling as though he is not getting enough pull in PIPs and DIPs. Asked pt to bring glove in for re-adjustments, possible splint fabrication to block MCPs. PT. To carry a 3# weight throughout the day/ and perform table compressions x To perform several times throughout the day secondary possible crps symptoms   Dry needling x 20 min//15 accupoints to calm CNS. Hep/rice grasp, tendon glides, towel scrunches x Encouraged to perform 5 x daily to improve finger mobiltiy and reduce swelling and stiffness. Contrast baths/ rice heating pad  2 x daily for reduction of swelling.  Improved mobility REAssessment/Comments: Pt is making Good progress toward stated plan of care. .Improved finger flexion today at the start of therapy. Pt. Working harder at home. Progressing well, changes as follows:      9/17/20 11/2/20 11/30/20 12/31/20  Pain level 2- 4-5                                                                        1-3  Active wrist ext/flex: 58/45                                                       58/45                                                       60/45                                        60/50  Active opposition increased to P4 level                                No change to P4 level only                 No change                             Full range  Active  increased to 1/2 range/3/4 ext cont's                 Increased to 2/3-3/4 range                   Cont's 2/3-3/4 range             3/4 flexion   Edema Description/Circumferential Measurements:               Mod swelling                                                                    Mod-mild swelling                                    Mod swelling conts                  Min-mod  Dynamometer (setting 2):                              Left: 82#                                                                           85#                                                          100#                                         100#              Right: 15#                                                                         25#                                                          30#                                           45#  Pinch Meter:              Lateral: Left= 15# ,Right= 12#                                       R/ 13#                                                        R/14#                                        16# Palmar 3 point: Left= 12#, Right= 12#                            R/ 14#                                                       R/ 14#                                      15#  9 Hole Peg Test:              Left: 20 sec              Right: 31 sec                                                                   28 sec WNL                                              WNL/ goal met     QuickDASH Score: (Scale 100% full disability, 0 no disability):  Decreased from initially 75 percent to 25%                       GOALS (Long term same as Short term):  1) Patient will demonstrate good understanding of home program (exercises/activities/diagnosis/prognosis/goals) with good accuracy. Goal progressing  2) Patient will demonstrate increased active/passive range of motion of their affected extremity/hand to [facility] for ADL/IADL completion./goal progressing  3) Patient will demonstrate increased /pinch strength of at least 10 / 5 pinch pounds of their affected hand. good progress  4) Patient to report decreased pain in their affected hand/upper extremity from 2-5/10 to 0-2/10 or less with resistive functional use. / good progress  5) Increase in fine motor function as evidenced by decreased time to complete 9-hole peg test and/or MRMT test by at least 30 seconds. Goal met  6) Patient will be knowledgeable of edema control techniques as evident with decreases from mod to none. Slow progress  7) Patient will report ADL functions same as prior to diagnosis of swelling R hand, CTR/ goal progressing  8) Patient will demonstrate improved functional activity tolerance from assist/mod ind to ind for ADL/IADL completion. Goal progressing  9) Patient will decrease QuickDASH score by 40% for increased participation in daily functional activities.  Goal progressing          -Rehab Potential: Good    -Requires OT Follow Up: Yes  Time In: 10:00 am            Time Out: 11:00 am           Treatment Charges: Mins Units Modalities/fluido 15 1   Ther Exercise 20 2   Manual Therapy 25 1   Thera Activities     ADL/Home Mgt      Neuro Re-education     Gait Training     Group Therapy     Non-Billable Service Time     Other     Total Time/Units 60 4   Frequency/Duration 2-3 / week for [id number].   Certification period From: present  To: 1/30/20     I have reviewed the Plan of Care established for skilled therapy services and certify that the services are required and that they will be provided while the patient is under my care.     Physician's Comments/Revisions:                   Physicians's Printed Name:  Dr. Corrie Perez   Physician's Signature:                                                               QARX:        Plan:   [x]  Continues Plan of care: Treatment covered based on POC and graduated to patient's progress. Pt education continues at each visit to obtain maximum benefits from skilled OT intervention. []  Alter Plan of care:   []  Discharge:      Jayme Sen OT/L, [address] [place] [address]    COVID-19 Screening completed upon entering facility and patient cleared for treatment today. Pt followed all protocols set forth by [address] [place] [address],[facility] for Outpatient services. Patient has been made aware of all new hygiene protocols due to COVID-19 set forth by [address] [place] [address],[address] Outpatient services and agrees to abide by all protocols.

## 2021-01-04 ENCOUNTER — HOSPITAL ENCOUNTER (OUTPATIENT)
Dept: OCCUPATIONAL THERAPY | Age: 66
Setting detail: THERAPIES SERIES
Discharge: HOME | End: 2021-01-04
Payer: MEDICARE

## 2021-01-04 PROCEDURE — 97022 WHIRLPOOL THERAPY: CPT | Performed by: OCCUPATIONAL THERAPIST

## 2021-01-04 PROCEDURE — 97110 THERAPEUTIC EXERCISES: CPT | Performed by: OCCUPATIONAL THERAPIST

## 2021-01-04 PROCEDURE — 97140 MANUAL THERAPY 1/> REGIONS: CPT | Performed by: OCCUPATIONAL THERAPIST

## 2021-01-04 NOTE — PROGRESS NOTES
[address]  [phone]    Date:  2021     Initial Evaluation Date: 8/3/20                                  Evaluating Therapist: Ayse Lauren     Patient Name:  [facility])                   :  1955     Restrictions/Precautions:  none, low fall risk  Diagnosis:  Swelling R hand /CTR R                                                  Insurance/Certification information:  [address] [place] [address] , Medicare  Referring Practitioner:  Dr. aKtie Perez  Referring Practitioner specific orders: eval and treat. Edema control, etc, rom. New orders 20 cont hand program. Pt. to see PT. for shoulder. Date of Surgery/Injury: CTR 7/15/20  Plan of care signed (Y/N):  No  Visit# / total visits: 37/ 18+12=30+18=48     Pain Level: moderate, aching    Subjective: Pt. States, \"I have been wearing the flexion glove more at home-I wore it before I came today\"     Objective:  Updated POC to be completed by 10th visit. Last reassessment 20  INTERVENTION: COMPLETED: SPECIFICS/COMMENTS:   Modality:     fluido x 15 min - To promote tissue healing, skin desensitization, reduce inflammation, and decrease pain. Actively completed SROM in all available planes with holds at end range during treatment       Paraffin  To wrist digits    AROM:     Wrist all planes  Increase wrist rom   Pendulums  2# wt swing all directions  Added to HEP   Mac grasp  15 min   Tendon glides x 15x2   Shoulder int/ext rotation stretches  15x2, for nerve gliding and tightness rotator cuff. Reduce overall pain shoulder/hand   Place and Holds x 12 reps in full fist, 8 reps in hook fist. Increased ROM noted. Fist Release and Place  Followed after place and hold, pt to open digits in extension and quickly place digits back into full flexion in attempts to achieve same motion with place and hold.     Fist Water Bead [employer] bead ball placed in palm of hand, digits flexed and wrapped with co-band to increase flexion. Pt to complete pumps with focus on digging DIPs and PIPs into ball.  reps. In hand Manipulation x Marbles-picking one up at at time-several in hand at a time, placing in a container one at a time. 4 sets, 10 marbles in hand at a time   Finger flex/ext     AAROM:     Shoulder flexion/ex  Wall pulley all directions  Added to HEP        PROM/Stretching:     Wrist /fingers as erin x 15 min Focus on full fist and hook fist. ^'d aggressivenses as tolerated. Putty Fist Rolls x 15 reps fist rolling in putty to loosen digits prior to PROM   Scar Mass/Edema Control:     Soft tissue mobilitzation x 10 min to increase tissue mobiltiy ,. Increase scar mobility        Strengthening:     3# wrist ext/flex x 20x2 increase wrist strength. digiflex green  3 min, finger mobility and stengthening   tan flexbar  15x2    Putty grasp/pull/pnch/ x 15 min /improving  strength and pinch    3.3 pound   15x2 to improve grasp/   UBE  10 min to increase circulation to help decrease swelling   clothespins  allcolors to increase pinch strength. Other:     fes  For finger flexion   kinesio taping  To reduce swelling and pain. Flexion glove /hep x Ongoing - To increase active finger flexion. Discussed flexion glove, pt feeling as though he is not getting enough pull in PIPs and DIPs. Asked pt to bring glove in for re-adjustments, possible splint fabrication to block MCPs. PT. To carry a 3# weight throughout the day/ and perform table compressions x To perform several times throughout the day secondary possible crps symptoms   Dry needling  20 min//15 accupoints to calm CNS. Hep/rice grasp, tendon glides, towel scrunches x Encouraged to perform 5 x daily to improve finger mobiltiy and reduce swelling and stiffness. Contrast baths/ rice heating pad  2 x daily for reduction of swelling. Improved mobility     REAssessment/Comments: Pt is making Good progress toward stated plan of care. .Improved finger flexion today at the start of therapy. Pt tolerated in hand manipulation activity with marbles well-composite fist maintained throughout holding marbles in hand while placing in bucket one at a time. Pt. Working harder at home reports wearing the flexion glove more frequently throughout the day at home. Will increase as tolerated. 9/17/20 11/2/20 11/30/20 12/31/20  Pain level 2- 4-5                                                                        1-3  Active wrist ext/flex: 58/45                                                       58/45                                                       60/45                                        60/50  Active opposition increased to P4 level                                No change to P4 level only                 No change                             Full range  Active  increased to 1/2 range/3/4 ext cont's                 Increased to 2/3-3/4 range                   Cont's 2/3-3/4 range             3/4 flexion   Edema Description/Circumferential Measurements:               Mod swelling                                                                    Mod-mild swelling                                    Mod swelling conts                  Min-mod  Dynamometer (setting 2):                              Left: 82#                                                                           85#                                                          100#                                         100#              Right: 15#                                                                         25#                                                          30#                                           45#  Pinch Meter:              Lateral: Left= 15# ,Right= 12# R/ 13#                                                        R/14#                                        16#              Palmar 3 point: Left= 12#, Right= 12#                            R/ 14#                                                       R/ 14#                                      15#  9 Hole Peg Test:              Left: 20 sec              Right: 31 sec                                                                   28 sec WNL                                              WNL/ goal met     QuickDASH Score: (Scale 100% full disability, 0 no disability):  Decreased from initially 75 percent to 25%                       GOALS (Long term same as Short term):  1) Patient will demonstrate good understanding of home program (exercises/activities/diagnosis/prognosis/goals) with good accuracy. Goal progressing  2) Patient will demonstrate increased active/passive range of motion of their affected extremity/hand to [facility] for ADL/IADL completion./goal progressing  3) Patient will demonstrate increased /pinch strength of at least 10 / 5 pinch pounds of their affected hand. good progress  4) Patient to report decreased pain in their affected hand/upper extremity from 2-5/10 to 0-2/10 or less with resistive functional use. / good progress  5) Increase in fine motor function as evidenced by decreased time to complete 9-hole peg test and/or MRMT test by at least 30 seconds. Goal met  6) Patient will be knowledgeable of edema control techniques as evident with decreases from mod to none. Slow progress  7) Patient will report ADL functions same as prior to diagnosis of swelling R hand, CTR/ goal progressing  8) Patient will demonstrate improved functional activity tolerance from assist/mod ind to ind for ADL/IADL completion. Goal progressing  9) Patient will decrease QuickDASH score by 40% for increased participation in daily functional activities.  Goal progressing          -Rehab Potential: Good    -Requires OT Follow Up: Yes  Time In: 9:00 am            Time Out: 10:00 am           Treatment Charges: Mins Units   Modalities/fluido 15 1   Ther Exercise 20 2   Manual Therapy 20 1   Thera Activities     ADL/Home Mgt      Neuro Re-education     Gait Training     Group Therapy     Non-Billable Service Time     Other     Total Time/Units 55 4   Frequency/Duration 2-3 / week for [id number].   Certification period From: present  To: 1/30/20     I have reviewed the Plan of Care established for skilled therapy services and certify that the services are required and that they will be provided while the patient is under my care.     Physician's Comments/Revisions:                   Physicians's Printed Name:  Dr. Barbara Larson   Physician's Signature:                                                               TRI:        Plan:   [x]  Continues Plan of care: Treatment covered based on POC and graduated to patient's progress. Pt education continues at each visit to obtain maximum benefits from skilled OT intervention. []  Alter Plan of care:   []  [facility] [place] [id number]    [facility] and patient cleared for treatment today. Pt followed all protocols set forth by [facility] for Outpatient services. Patient has been made aware of all new hygiene protocols due to COVID-19 set forth by [facility] Outpatient services and agrees to abide by all protocols.

## 2021-01-06 ENCOUNTER — HOSPITAL ENCOUNTER (OUTPATIENT)
Dept: OCCUPATIONAL THERAPY | Age: 66
Setting detail: THERAPIES SERIES
Discharge: HOME | End: 2021-01-06
Payer: MEDICARE

## 2021-01-06 PROCEDURE — 97022 WHIRLPOOL THERAPY: CPT | Performed by: OCCUPATIONAL THERAPIST

## 2021-01-06 PROCEDURE — 97110 THERAPEUTIC EXERCISES: CPT | Performed by: OCCUPATIONAL THERAPIST

## 2021-01-06 PROCEDURE — 97140 MANUAL THERAPY 1/> REGIONS: CPT | Performed by: OCCUPATIONAL THERAPIST

## 2021-01-06 NOTE — PROGRESS NOTES
[address]  [phone]    Date:  2021     Initial Evaluation Date: 8/3/20                                  Evaluating Therapist: Timothy Stallings     Patient Name:  [facility])                   :  1955     Restrictions/Precautions:  none, low fall risk  Diagnosis:  Swelling R hand /CTR R                                                  Insurance/Certification information:  [employer]url Hans, Medicare  Referring Practitioner:  Dr. Wendy Chiang  Referring Practitioner specific orders: eval and treat. Edema control, etc, rom. New orders 20 cont hand program. Pt. to see PT. for shoulder. Date of Surgery/Injury: CTR 7/15/20  Plan of care signed (Y/N):  No  Visit# / total visits: 38/ 18+12=30+18=48     Pain Level: moderate, aching    Subjective: Pt. States, \"I'm happy its finally responding and getting better\"     Objective:  Updated POC to be completed by 10th visit. Last reassessment 20  INTERVENTION: COMPLETED: SPECIFICS/COMMENTS:   Modality:     fluido x 15 min - To promote tissue healing, skin desensitization, reduce inflammation, and decrease pain. Actively completed SROM in all available planes with holds at end range during treatment       Paraffin  To wrist digits    AROM:     Wrist all planes  Increase wrist rom   Pendulums  2# wt swing all directions  Added to HEP   Mac grasp  15 min   Tendon glides x 15x2   Shoulder int/ext rotation stretches  15x2, for nerve gliding and tightness rotator cuff. Reduce overall pain shoulder/hand   Place and Holds x 12 reps in full fist, 8 reps in hook fist. Increased ROM noted. Fist Release and Place  Followed after place and hold, pt to open digits in extension and quickly place digits back into full flexion in attempts to achieve same motion with place and hold. Fist Water [employer] bead ball placed in palm of hand, digits flexed and wrapped with co-band to increase flexion.  Pt to complete pumps with focus on digging DIPs and PIPs into ball.  reps. In hand Manipulation  Marbles-picking one up at at time-several in hand at a time, placing in a container one at a time. 4 sets, 10 marbles in hand at a time   Finger flex/ext     AAROM:     Shoulder flexion/ex  Wall pulley all directions  Added to HEP        PROM/Stretching:     Wrist /fingers as erin x 15 min Focus on full fist and hook fist. ^'d aggressivenses as tolerated. Putty Fist Rolls x 15 reps fist rolling in putty to loosen digits prior to PROM   Scar Mass/Edema Control:     Soft tissue mobilitzation x 10 min to increase tissue mobiltiy ,. Increase scar mobility        Strengthenin# wrist ext/flex x 20x2 increase wrist strength. digiflex green x 3 min, finger mobility and stengthening   tan flexbar x 15x2    Putty grasp/pull/pnch/ x 15 min /improving  strength and pinch    3.3 pound   15x2 to improve grasp/   UBE  10 min to increase circulation to help decrease swelling   clothespins x Blue/black to increase pinch strength. Other:     fes  For finger flexion   kinesio taping  To reduce swelling and pain. Flexion glove /hep x Ongoing - To increase active finger flexion. Discussed flexion glove, pt feeling as though he is not getting enough pull in PIPs and DIPs. Asked pt to bring glove in for re-adjustments, possible splint fabrication to block MCPs. PT. To carry a 3# weight throughout the day/ and perform table compressions x To perform several times throughout the day secondary possible crps symptoms   Dry needling  20 min//15 accupoints to calm CNS. Hep/rice grasp, tendon glides, towel scrunches x Encouraged to perform 5 x daily to improve finger mobiltiy and reduce swelling and stiffness. Contrast baths/ rice heating pad  2 x daily for reduction of swelling. Improved mobility     REAssessment/Comments: Pt is making Good progress toward stated plan of care. .Improved finger flexion today at the start of therapy. Progressing with rom and strength. Less crps symptoms noted. Improved circulation is noted. 9/17/20 11/2/20 11/30/20 12/31/20  Pain level 2- 4-5                                                                        1-3  Active wrist ext/flex: 58/45                                                       58/45                                                       60/45                                        60/50  Active opposition increased to P4 level                                No change to P4 level only                 No change                             Full range  Active  increased to 1/2 range/3/4 ext cont's                 Increased to 2/3-3/4 range                   Cont's 2/3-3/4 range             3/4 flexion   Edema Description/Circumferential Measurements:               Mod swelling                                                                    Mod-mild swelling                                    Mod swelling conts                  Min-mod  Dynamometer (setting 2):                              Left: 82#                                                                           85#                                                          100#                                         100#              Right: 15#                                                                         25#                                                          30#                                           45#  Pinch Meter:              Lateral: Left= 15# ,Right= 12#                                       R/ 13#                                                        R/14#                                        16#              Palmar 3 point: Left= 12#, Right= 12#                            R/ 14# Re-education     Gait Training     Group Therapy     Non-Billable Service Time     Other     Total Time/Units 55 4   Frequency/Duration 2-3 / week for [id number].   Certification period From: present  To: 1/30/20     I have reviewed the Plan of Care established for skilled therapy services and certify that the services are required and that they will be provided while the patient is under my care.     Physician's Comments/Revisions:                   Physicians's Printed Name:  Dr. Luz Laurent   Physician's Signature:                                                               LYRS:        Plan:   [x]  Continues Plan of care: Treatment covered based on POC and graduated to patient's progress. Pt education continues at each visit to obtain maximum benefits from skilled OT intervention. []  Alter Plan of care:   []  Discharge:      Lenard Pisano OT/L, [address] [place] [address]    COVID-19 Screening completed upon entering facility and patient cleared for treatment today. Pt followed all protocols set forth by [address] [place] [address],[address] for Outpatient services. Patient has been made aware of all new hygiene protocols due to COVID-19 set forth by [address] [place] [address],[address] Outpatient services and agrees to abide by all protocols.

## 2021-01-11 ENCOUNTER — HOSPITAL ENCOUNTER (OUTPATIENT)
Dept: OCCUPATIONAL THERAPY | Age: 66
Setting detail: THERAPIES SERIES
Discharge: HOME | End: 2021-01-11
Payer: MEDICARE

## 2021-01-11 PROCEDURE — 97110 THERAPEUTIC EXERCISES: CPT | Performed by: OCCUPATIONAL THERAPIST

## 2021-01-11 PROCEDURE — 97022 WHIRLPOOL THERAPY: CPT | Performed by: OCCUPATIONAL THERAPIST

## 2021-01-11 PROCEDURE — 97140 MANUAL THERAPY 1/> REGIONS: CPT | Performed by: OCCUPATIONAL THERAPIST

## 2021-01-11 NOTE — PROGRESS NOTES
[address]  [phone]    Date:  2021     Initial Evaluation Date: 8/3/20                                  Evaluating Therapist: Harshil Justice     Patient Name:  Cat Gaitan [facility])                   :  1955     Restrictions/Precautions:  none, low fall risk  Diagnosis:  Swelling R hand /CTR R                                                  Insurance/Certification information:  [place], Medicare  Referring Practitioner:  Dr. Debbora Siemens  Referring Practitioner specific orders: eval and treat. Edema control, etc, rom. New orders 20 cont hand program. Pt. to see PT. for shoulder. Date of Surgery/Injury: CTR 7/15/20  Plan of care signed (Y/N):  No  Visit# / total visits: 39/ 18+12=30+18=48     Pain Level: moderate, aching    Subjective: Pt. States, \"feeling stronger, still putty glove on every morning. \"     Objective:  Updated POC to be completed by 10th visit. Last reassessment 20  INTERVENTION: COMPLETED: SPECIFICS/COMMENTS:   Modality:     fluido x 15 min - To promote tissue healing, skin desensitization, reduce inflammation, and decrease pain. Actively completed SROM in all available planes with holds at end range during treatment       Paraffin  To wrist digits    AROM:     Wrist all planes  Increase wrist rom   Pendulums  2# wt swing all directions  Added to HEP   Mac grasp  15 min   Tendon glides x 15x2   Shoulder int/ext rotation stretches  15x2, for nerve gliding and tightness rotator cuff. Reduce overall pain shoulder/hand   Place and Holds x 12 reps in full fist, 8 reps in hook fist. Increased ROM noted. Fist Release and Place  Followed after place and hold, pt to open digits in extension and quickly place digits back into full flexion in attempts to achieve same motion with place and hold. Fist Water [employer] bead ball placed in palm of hand, digits flexed and wrapped with co-band to increase flexion.  Pt to complete pumps with focus on digging DIPs and PIPs into ball.  reps. In hand Manipulation  Marbles-picking one up at at time-several in hand at a time, placing in a container one at a time. 4 sets, 10 marbles in hand at a time   Finger flex/ext     AAROM:     Shoulder flexion/ex  Wall pulley all directions  Added to HEP        PROM/Stretching:     Wrist /fingers as erin x 15 min Focus on full fist and hook fist. ^'d aggressivenses as tolerated. Putty Fist Rolls x 15 reps fist rolling in putty to loosen digits prior to PROM   Scar Mass/Edema Control:     Soft tissue mobilitzation x 10 min to increase tissue mobiltiy ,. Increase scar mobility        Strengthenin# wrist ext/flex x 20x2 increase wrist strength. digiflex green x 3 min, finger mobility and stengthening   tan flexbar x 15x2    Putty grasp/pull/pnch/ x 15 min /improving  strength and pinch    3.3 pound   15x2 to improve grasp/   UBE  10 min to increase circulation to help decrease swelling   clothespins x Blue/black to increase pinch strength. Other:     fes  For finger flexion   kinesio taping  To reduce swelling and pain. Flexion glove /hep x Ongoing - To increase active finger flexion. Discussed flexion glove, pt feeling as though he is not getting enough pull in PIPs and DIPs. Asked pt to bring glove in for re-adjustments, possible splint fabrication to block MCPs. PT. To carry a 3# weight throughout the day/ and perform table compressions x To perform several times throughout the day secondary possible crps symptoms   Dry needling  20 min//15 accupoints to calm CNS. Hep/rice grasp, tendon glides, towel scrunches x Encouraged to perform 5 x daily to improve finger mobiltiy and reduce swelling and stiffness. Contrast baths/ rice heating pad  2 x daily for reduction of swelling. Improved mobility     Assessment/Comments: Pt is making Good progress toward stated plan of care. .Improved finger flexion today at the start of therapy. Progressing with rom and strength. Less crps symptoms noted. Improved circulation cont's. 9/17/20 11/2/20 11/30/20 12/31/20  Pain level 2- 4-5                                                                        1-3  Active wrist ext/flex: 58/45                                                       58/45                                                       60/45                                        60/50  Active opposition increased to P4 level                                No change to P4 level only                 No change                             Full range  Active  increased to 1/2 range/3/4 ext cont's                 Increased to 2/3-3/4 range                   Cont's 2/3-3/4 range             3/4 flexion   Edema Description/Circumferential Measurements:               Mod swelling                                                                    Mod-mild swelling                                    Mod swelling conts                  Min-mod  Dynamometer (setting 2):                              Left: 82#                                                                           85#                                                          100#                                         100#              Right: 15#                                                                         25#                                                          30#                                           45#  Pinch Meter:              Lateral: Left= 15# ,Right= 12#                                       R/ 13#                                                        R/14#                                        16#              Palmar 3 point: Left= 12#, Right= 12#                            R/ 14# R/ 14#                                      15#  9 Hole Peg Test:              Left: 20 sec              Right: 31 sec                                                                   28 sec WNL                                              WNL/ goal met     QuickDASH Score: (Scale 100% full disability, 0 no disability):  Decreased from initially 75 percent to 25%                       GOALS (Long term same as Short term):  1) Patient will demonstrate good understanding of home program (exercises/activities/diagnosis/prognosis/goals) with good accuracy. Goal progressing  2) Patient will demonstrate increased active/passive range of motion of their affected extremity/hand to [facility] for ADL/IADL completion./goal progressing  3) Patient will demonstrate increased /pinch strength of at least 10 / 5 pinch pounds of their affected hand. good progress  4) Patient to report decreased pain in their affected hand/upper extremity from 2-5/10 to 0-2/10 or less with resistive functional use. / good progress  5) Increase in fine motor function as evidenced by decreased time to complete 9-hole peg test and/or MRMT test by at least 30 seconds. Goal met  6) Patient will be knowledgeable of edema control techniques as evident with decreases from mod to none. Slow progress  7) Patient will report ADL functions same as prior to diagnosis of swelling R hand, CTR/ goal progressing  8) Patient will demonstrate improved functional activity tolerance from assist/mod ind to ind for ADL/IADL completion. Goal progressing  9) Patient will decrease QuickDASH score by 40% for increased participation in daily functional activities.  Goal progressing          -Rehab Potential: Good    -Requires OT Follow Up: Yes  Time In: 9:00 am            Time Out: 10:00 am           Treatment Charges: Mins Units   Modalities/fluido 15 1   Ther Exercise 20 2   Manual Therapy 20 1   Thera Activities     ADL/Home Mgt      Neuro Re-education     Gait Training     Group Therapy     Non-Billable Service Time     Other     Total Time/Units 55 4   Frequency/Duration 2-3 / week for [id number].   Certification period From: present  To: 1/30/20     I have reviewed the Plan of Care established for skilled therapy services and certify that the services are required and that they will be provided while the patient is under my care.     Physician's Comments/Revisions:                   Physicians's Printed Name:  Dr. Desi Miles   Physician's Signature:                                                               QRSI:        Plan:   [x]  Continues Plan of care: Treatment covered based on POC and graduated to patient's progress. Pt education continues at each visit to obtain maximum benefits from skilled OT intervention. []  Alter Plan of care:   []  Discharge:      Timothy Stallings OT/L, [address] [place] [address]    COVID-19 Screening completed upon entering facility and patient cleared for treatment today. Pt followed all protocols set forth by [address] [place] [address],[address] for Outpatient services. Patient has been made aware of all new hygiene protocols due to COVID-19 set forth by [address] [place] [address],[address] Outpatient services and agrees to abide by all protocols.

## 2021-01-14 ENCOUNTER — HOSPITAL ENCOUNTER (OUTPATIENT)
Dept: OCCUPATIONAL THERAPY | Age: 66
Setting detail: THERAPIES SERIES
Discharge: HOME | End: 2021-01-14
Payer: MEDICARE

## 2021-01-14 PROCEDURE — 97140 MANUAL THERAPY 1/> REGIONS: CPT | Performed by: OCCUPATIONAL THERAPIST

## 2021-01-14 PROCEDURE — 97110 THERAPEUTIC EXERCISES: CPT | Performed by: OCCUPATIONAL THERAPIST

## 2021-01-14 PROCEDURE — 97022 WHIRLPOOL THERAPY: CPT | Performed by: OCCUPATIONAL THERAPIST

## 2021-01-14 NOTE — PROGRESS NOTES
placed in palm of hand, digits flexed and wrapped with co-band to increase flexion. Pt to complete pumps with focus on digging DIPs and PIPs into ball.  reps. In hand Manipulation  Marbles-picking one up at at time-several in hand at a time, placing in a container one at a time. 4 sets, 10 marbles in hand at a time   Finger flex/ext     AAROM:     Shoulder flexion/ex  Wall pulley all directions  Added to HEP        PROM/Stretching:     Wrist /fingers as erin x 15 min Focus on full fist and hook fist. ^'d aggressivenses as tolerated. Putty Fist Rolls x 15 reps fist rolling in putty to loosen digits prior to PROM   Scar Mass/Edema Control:     Soft tissue mobilitzation x 10 min to increase tissue mobiltiy ,. Increase scar mobility        Strengthenin# wrist ext/flex x 20x2 increase wrist strength. digiflex green x 3 min, finger mobility and stengthening   tan flexbar x 15x2    Putty grasp/pull/pnch/ x 15 min /improving  strength and pinch    3.3 pound   15x2 to improve grasp/   UBE  10 min to increase circulation to help decrease swelling   clothespins x Blue/black to increase pinch strength. Other:     fes  For finger flexion   kinesio taping  To reduce swelling and pain. Flexion glove /hep x Ongoing - To increase active finger flexion. Discussed flexion glove, pt feeling as though he is not getting enough pull in PIPs and DIPs. Asked pt to bring glove in for re-adjustments, possible splint fabrication to block MCPs. PT. To carry a 3# weight throughout the day/ and perform table compressions x To perform several times throughout the day secondary possible crps symptoms   Dry needling  20 min//15 accupoints to calm CNS. Hep/rice grasp, tendon glides, towel scrunches x Encouraged to perform 5 x daily to improve finger mobiltiy and reduce swelling and stiffness. Contrast baths/ rice heating pad  2 x daily for reduction of swelling.  Improved mobility Assessment/Comments: Pt is making Good progress toward stated plan of care. .Improved finger flexion today at the start of therapy. Progressing with rom and strength. Less crps symptoms noted. Improved circulation cont's. 9/17/20 11/2/20 11/30/20 12/31/20  Pain level 2- 4-5                                                                        1-3  Active wrist ext/flex: 58/45                                                       58/45                                                       60/45                                        60/50  Active opposition increased to P4 level                                No change to P4 level only                 No change                             Full range  Active  increased to 1/2 range/3/4 ext cont's                 Increased to 2/3-3/4 range                   Cont's 2/3-3/4 range             3/4 flexion   Edema Description/Circumferential Measurements:               Mod swelling                                                                    Mod-mild swelling                                    Mod swelling conts                  Min-mod  Dynamometer (setting 2):                              Left: 82#                                                                           85#                                                          100#                                         100#              Right: 15#                                                                         25#                                                          30#                                           45#  Pinch Meter:              Lateral: Left= 15# ,Right= 12#                                       R/ 13#                                                        R/14#                                        16# Modalities/fluido 15 1   Ther Exercise 20 2   Manual Therapy 20 1   Thera Activities     ADL/Home Mgt      Neuro Re-education     Gait Training     Group Therapy     Non-Billable Service Time     Other     Total Time/Units 55 4   Frequency/Duration 2-3 / week for [id number].   Certification period From: present  To: 1/30/20     I have reviewed the Plan of Care established for skilled therapy services and certify that the services are required and that they will be provided while the patient is under my care.     Physician's Comments/Revisions:                   Physicians's Printed Name:  Dr. Hussein Christianson   Physician's Signature:                                                               LAYLA:        Plan:   [x]  Continues Plan of care: Treatment covered based on POC and graduated to patient's progress. Pt education continues at each visit to obtain maximum benefits from skilled OT intervention. []  Alter Plan of care:   []  Discharge:      Theresa Chan OT/L, [address] [place] [address] [id number]    COVID-19 Screening completed upon entering facility and patient cleared for treatment today. Pt followed all protocols set forth by [facility] for Outpatient services. Patient has been made aware of all new hygiene protocols due to COVID-19 set forth by [facility] Outpatient services and agrees to abide by all protocols.

## 2021-01-18 ENCOUNTER — HOSPITAL ENCOUNTER (OUTPATIENT)
Dept: OCCUPATIONAL THERAPY | Age: 66
Setting detail: THERAPIES SERIES
Discharge: HOME | End: 2021-01-18
Payer: MEDICARE

## 2021-01-18 PROCEDURE — 97110 THERAPEUTIC EXERCISES: CPT | Performed by: OCCUPATIONAL THERAPIST

## 2021-01-18 PROCEDURE — 97140 MANUAL THERAPY 1/> REGIONS: CPT | Performed by: OCCUPATIONAL THERAPIST

## 2021-01-18 PROCEDURE — 97022 WHIRLPOOL THERAPY: CPT | Performed by: OCCUPATIONAL THERAPIST

## 2021-01-18 NOTE — PROGRESS NOTES
[address]  [phone]    Date:  2021     Initial Evaluation Date: 8/3/20                                  Evaluating Therapist: Peri Herring     Patient Name:  [facility])                   :  1955     Restrictions/Precautions:  none, low fall risk  Diagnosis:  Swelling R hand /CTR R                                                  Insurance/Certification information:  [employer], Medicare  Referring Practitioner:  Dr. Dione Cedillo  Referring Practitioner specific orders: eval and treat. Edema control, etc, rom. New orders 20 cont hand program. Pt. to see PT. for shoulder. Date of Surgery/Injury: CTR 7/15/20  Plan of care signed (Y/N):  No  Visit# / total visits: 39+12=30+18=48   ( approved for 30 visits a year) needs recert by 3/21/34. Pain Level: moderate, aching    Subjective: Pt. States, \" Finally swelling is coming down. Pa Barber .\"     Objective:  Updated POC to be completed by 10th visit. Last reassessment 20  INTERVENTION: COMPLETED: SPECIFICS/COMMENTS:   Modality:     fluido x 15 min - To promote tissue healing, skin desensitization, reduce inflammation, and decrease pain. Actively completed SROM in all available planes with holds at end range during treatment       Paraffin  To wrist digits    AROM:     Wrist all planes  Increase wrist rom   Pendulums  2# wt swing all directions  Added to HEP   Mac grasp  15 min   Tendon glides x 15x2   Shoulder int/ext rotation stretches  15x2, for nerve gliding and tightness rotator cuff. Reduce overall pain shoulder/hand   Place and Holds x 12 reps in full fist, 8 reps in hook fist. Increased ROM noted. Fist Release and Place  Followed after place and hold, pt to open digits in extension and quickly place digits back into full flexion in attempts to achieve same motion with place and hold.     Fist Water Bead [employer] bead ball placed in palm of hand, digits flexed and wrapped with of care. .Improved finger flexion today at the start of therapy. Progressing with rom and strength. Less crps symptoms cont. Less overall swelling wrist and fingers. Sponges given today to put inbetween fingers to use with home exercises to cont. swelling reduction. 9/17/20 11/2/20 11/30/20 12/31/20  Pain level 2- 4-5                                                                        1-3  Active wrist ext/flex: 58/45                                                       58/45                                                       60/45                                        60/50  Active opposition increased to P4 level                                No change to P4 level only                 No change                             Full range  Active  increased to 1/2 range/3/4 ext cont's                 Increased to 2/3-3/4 range                   Cont's 2/3-3/4 range             3/4 flexion   Edema Description/Circumferential Measurements:               Mod swelling                                                                    Mod-mild swelling                                    Mod swelling conts                  Min-mod  Dynamometer (setting 2):                              Left: 82#                                                                           85#                                                          100#                                         100#              Right: 15#                                                                         25#                                                          30#                                           45#  Pinch Meter:              Lateral: Left= 15# ,Right= 12#                                       R/ 13#                                                        R/14# 16#              Palmar 3 point: Left= 12#, Right= 12#                            R/ 14#                                                       R/ 14#                                      15#  9 Hole Peg Test:              Left: 20 sec              Right: 31 sec                                                                   28 sec WNL                                              WNL/ goal met     QuickDASH Score: (Scale 100% full disability, 0 no disability):  Decreased from initially 75 percent to 25%                       GOALS (Long term same as Short term):  1) Patient will demonstrate good understanding of home program (exercises/activities/diagnosis/prognosis/goals) with good accuracy. Goal progressing  2) Patient will demonstrate increased active/passive range of motion of their affected extremity/hand to [facility] for ADL/IADL completion./goal progressing  3) Patient will demonstrate increased /pinch strength of at least 10 / 5 pinch pounds of their affected hand. good progress  4) Patient to report decreased pain in their affected hand/upper extremity from 2-5/10 to 0-2/10 or less with resistive functional use. / good progress  5) Increase in fine motor function as evidenced by decreased time to complete 9-hole peg test and/or MRMT test by at least 30 seconds. Goal met  6) Patient will be knowledgeable of edema control techniques as evident with decreases from mod to none. Slow progress  7) Patient will report ADL functions same as prior to diagnosis of swelling R hand, CTR/ goal progressing  8) Patient will demonstrate improved functional activity tolerance from assist/mod ind to ind for ADL/IADL completion. Goal progressing  9) Patient will decrease QuickDASH score by 40% for increased participation in daily functional activities.  Goal progressing          -Rehab Potential: Good    -Requires OT Follow Up: Yes  Time In: 9:00 am            Time Out: 10:00 am Treatment Charges: Mins Units   Modalities/fluido 15 1   Ther Exercise 20 2   Manual Therapy 20 1   Thera Activities     ADL/Home Mgt      Neuro Re-education     Gait Training     Group Therapy     Non-Billable Service Time     Other     Total Time/Units 55 4   Frequency/Duration 2-3 / week for [id number].   Certification period From: present  To: 1/30/20     I have reviewed the Plan of Care established for skilled therapy services and certify that the services are required and that they will be provided while the patient is under my care.     Physician's Comments/Revisions:                   Physicians's Printed Name:  Dr. Ganesh Hernandez   Physician's Signature:                                                               SGUT:        Plan:   [x]  Continues Plan of care: Treatment covered based on POC and graduated to patient's progress. Pt education continues at each visit to obtain maximum benefits from skilled OT intervention. []  Alter Plan of care:   []  Discharge:      Penny Laguna OT/L, [place] [id number]    COVID-19 Screening completed upon entering facility and patient cleared for treatment today. Pt followed all protocols set forth by [facility] for Outpatient services. Patient has been made aware of all new hygiene protocols due to COVID-19 set forth by [facility] Outpatient services and agrees to abide by all protocols.

## 2021-01-21 ENCOUNTER — HOSPITAL ENCOUNTER (OUTPATIENT)
Dept: OCCUPATIONAL THERAPY | Age: 66
Setting detail: THERAPIES SERIES
Discharge: HOME | End: 2021-01-21
Payer: MEDICARE

## 2021-01-21 PROCEDURE — 97110 THERAPEUTIC EXERCISES: CPT | Performed by: OCCUPATIONAL THERAPIST

## 2021-01-21 PROCEDURE — 97140 MANUAL THERAPY 1/> REGIONS: CPT | Performed by: OCCUPATIONAL THERAPIST

## 2021-01-21 PROCEDURE — 97022 WHIRLPOOL THERAPY: CPT | Performed by: OCCUPATIONAL THERAPIST

## 2021-01-21 NOTE — PROGRESS NOTES
[address]  [phone]    Date:  2021     Initial Evaluation Date: 8/3/20                                  Evaluating Therapist: Brigitte Moncada     Patient Name:  Dorina Alcazar [facility])                   :  1955     Restrictions/Precautions:  none, low fall risk  Diagnosis:  Swelling R hand /CTR R                                                  Insurance/Certification information:  [employer], Medicare  Referring Practitioner:  Dr. Abhijeet Johnson  Referring Practitioner specific orders: eval and treat. Edema control, etc, rom. New orders 20 cont hand program. Pt. to see PT. for shoulder. Date of Surgery/Injury: CTR 7/15/20  Plan of care signed (Y/N):  No  Visit# / total visits: 40/ +12=30+18=48   ( approved for 30 visits a year) needs recert by 82. Pain Level: moderate, aching    Subjective: Pt. States, \" Feeling stronger today. Maida Salm .\"     Objective:  Updated POC to be completed by 10th visit. Last reassessment 20  INTERVENTION: COMPLETED: SPECIFICS/COMMENTS:   Modality:     fluido x 15 min - To promote tissue healing, skin desensitization, reduce inflammation, and decrease pain. Actively completed SROM in all available planes with holds at end range during treatment       Paraffin  To wrist digits    AROM:     Wrist all planes x Increase wrist rom   Pendulums  2# wt swing all directions  Added to HEP   Mac grasp  15 min   Table top scrubbing with towel x To improve wrist extension. Shoulder int/ext rotation stretches  15x2, for nerve gliding and tightness rotator cuff. Reduce overall pain shoulder/hand   Place and Holds  12 reps in full fist, 8 reps in hook fist. Increased ROM noted. Fist Release and Place  Followed after place and hold, pt to open digits in extension and quickly place digits back into full flexion in attempts to achieve same motion with place and hold.     Fist Water B[employer] bead ball placed in palm of hand, digits flexed and wrapped with co-band to increase flexion. Pt to complete pumps with focus on digging DIPs and PIPs into ball.  reps. In hand Manipulation  Marbles-picking one up at at time-several in hand at a time, placing in a container one at a time. 4 sets, 10 marbles in hand at a time   Finger flex/ext     AAROM:     Shoulder flexion/ex  Wall pulley all directions  Added to HEP        PROM/Stretching:     Wrist /fingers as erin x 15 min Focus on full fist and hook fist. ^'d aggressivenses as tolerated. Putty Fist Rolls x 15 reps fist rolling in putty to loosen digits prior to PROM   Scar Mass/Edema Control:     Soft tissue mobilitzation x 10 min to increase tissue mobiltiy ,. Increase scar mobility        Strengthenin# wrist ext/flex x 20x2 increase wrist strength. digiflex green x 3 min, finger mobility and stengthening   tan flexbar x 15x2    Putty grasp/pull/pnch/ x 15 min /improving  strength and pinch    4.3 pound  ball  15x2 to improve grasp/   UBE  10 min to increase circulation to help decrease swelling   clothespins x Blue/black to increase pinch strength. Other:     fes  For finger flexion   kinesio taping  To reduce swelling and pain. Flexion glove /hep x Ongoing - To increase active finger flexion. Discussed flexion glove, pt feeling as though he is not getting enough pull in PIPs and DIPs. Asked pt to bring glove in for re-adjustments, possible splint fabrication to block MCPs. PT. To carry a 3# weight throughout the day/ and perform table compressions x To perform several times throughout the day secondary possible crps symptoms   Dry needling  20 min//15 accupoints to calm CNS. Hep/rice grasp, tendon glides, towel scrunches  Encouraged to perform 5 x daily to improve finger mobiltiy and reduce swelling and stiffness. Contrast baths/ rice heating pad  2 x daily for reduction of swelling.  Improved mobility     Assessment/Comments: Pt is making Good progress toward stated plan of care. Strength cont to progress.  strength cont to progress today increasing from 45# to 55# since last measured. 9/17/20 11/2/20 11/30/20 12/31/20  Pain level 2- 4-5                                                                        1-3  Active wrist ext/flex: 58/45                                                       58/45                                                       60/45                                        60/50  Active opposition increased to P4 level                                No change to P4 level only                 No change                             Full range  Active  increased to 1/2 range/3/4 ext cont's                 Increased to 2/3-3/4 range                   Cont's 2/3-3/4 range             3/4 flexion   Edema Description/Circumferential Measurements:               Mod swelling                                                                    Mod-mild swelling                                    Mod swelling conts                  Min-mod  Dynamometer (setting 2):                              Left: 82#                                                                           85#                                                          100#                                         100#              Right: 15#                                                                         25#                                                          30#                                           45#  Pinch Meter:              Lateral: Left= 15# ,Right= 12#                                       R/ 13#                                                        R/14#                                        16#              Palmar 3 point: Left= 12#, Right= 12# R/ 14#                                                       R/ 14#                                      15#  9 Hole Peg Test:              Left: 20 sec              Right: 31 sec                                                                   28 sec WNL                                              WNL/ goal met     QuickDASH Score: (Scale 100% full disability, 0 no disability):  Decreased from initially 75 percent to 25%                       GOALS (Long term same as Short term):  1) Patient will demonstrate good understanding of home program (exercises/activities/diagnosis/prognosis/goals) with good accuracy. Goal progressing  2) Patient will demonstrate increased active/passive range of motion of their affected extremity/hand to [facility] for ADL/IADL completion./goal progressing  3) Patient will demonstrate increased /pinch strength of at least 10 / 5 pinch pounds of their affected hand. good progress  4) Patient to report decreased pain in their affected hand/upper extremity from 2-5/10 to 0-2/10 or less with resistive functional use. / good progress  5) Increase in fine motor function as evidenced by decreased time to complete 9-hole peg test and/or MRMT test by at least 30 seconds. Goal met  6) Patient will be knowledgeable of edema control techniques as evident with decreases from mod to none. Slow progress  7) Patient will report ADL functions same as prior to diagnosis of swelling R hand, CTR/ goal progressing  8) Patient will demonstrate improved functional activity tolerance from assist/mod ind to ind for ADL/IADL completion. Goal progressing  9) Patient will decrease QuickDASH score by 40% for increased participation in daily functional activities.  Goal progressing          -Rehab Potential: Good    -Requires OT Follow Up: Yes  Time In: 9:00 am            Time Out: 10:00 am           Treatment Charges: Mins Units   Modalities/fluido 15 1   Ther Exercise 20 2   Manual Therapy 20 1

## 2021-01-25 ENCOUNTER — HOSPITAL ENCOUNTER (OUTPATIENT)
Dept: OCCUPATIONAL THERAPY | Age: 66
Setting detail: THERAPIES SERIES
Discharge: HOME | End: 2021-01-25
Payer: MEDICARE

## 2021-01-25 PROCEDURE — 97110 THERAPEUTIC EXERCISES: CPT | Performed by: OCCUPATIONAL THERAPIST

## 2021-01-25 PROCEDURE — 97022 WHIRLPOOL THERAPY: CPT | Performed by: OCCUPATIONAL THERAPIST

## 2021-01-25 PROCEDURE — 97140 MANUAL THERAPY 1/> REGIONS: CPT | Performed by: OCCUPATIONAL THERAPIST

## 2021-01-25 NOTE — PROGRESS NOTES
[address]  [phone]    Date:  2021     Initial Evaluation Date: 8/3/20                                  Evaluating Therapist: Michael Frankel     Patient Name:  Chance  [facility])                   :  1955     Restrictions/Precautions:  none, low fall risk  Diagnosis:  Swelling R hand /CTR R                                                  Insurance/Certification information:  [employer], Medicare  Referring Practitioner:  Dr. Hina Lopez  Referring Practitioner specific orders: eval and treat. Edema control, etc, rom. New orders 20 cont hand program. Pt. to see PT. for shoulder. Date of Surgery/Injury: CTR 7/15/20  Plan of care signed (Y/N):  No  Visit# / total visits: 41/ 18+12=30+18=48   ( approved for 30 visits a year) needs recert by . Pain Level: moderate, aching    Subjective: Pt. States, \" Finally making good progress. [place] .\"     Objective:  Updated POC to be completed by 10th visit. Last reassessment 20  INTERVENTION: COMPLETED: SPECIFICS/COMMENTS:   Modality:     fluido x 15 min - To promote tissue healing, skin desensitization, reduce inflammation, and decrease pain. Actively completed SROM in all available planes with holds at end range during treatment       Paraffin  To wrist digits    AROM:     Wrist all planes x Increase wrist rom   Pendulums  2# wt swing all directions  Added to HEP   Mac grasp  15 min   Table top scrubbing with towel x To improve wrist extension. Shoulder int/ext rotation stretches  15x2, for nerve gliding and tightness rotator cuff. Reduce overall pain shoulder/hand   Place and Holds  12 reps in full fist, 8 reps in hook fist. Increased ROM noted. Fist Release and Place  Followed after place and hold, pt to open digits in extension and quickly place digits back into full flexion in attempts to achieve same motion with place and hold.     Fist Water B[employer] bead ball placed in palm of hand, digits flexed and wrapped with co-band to increase flexion. Pt to complete pumps with focus on digging DIPs and PIPs into ball.  reps. In hand Manipulation  Marbles-picking one up at at time-several in hand at a time, placing in a container one at a time. 4 sets, 10 marbles in hand at a time   Finger flex/ext     AAROM:     Shoulder flexion/ex  Wall pulley all directions  Added to HEP        PROM/Stretching:     Wrist /fingers as erin x 15 min Focus on full fist and hook fist. ^'d aggressivenses as tolerated. Putty Fist Rolls x 15 reps fist rolling in putty to loosen digits prior to PROM   Scar Mass/Edema Control:     Soft tissue mobilitzation x 10 min to increase tissue mobiltiy ,. Increase scar mobility        Strengthenin# wrist ext/flex x 20x2 increase wrist strength. digiflex green x 3 min, finger mobility and stengthening   tan flexbar x 15x2    Putty grasp/pull/pnch/ x 15 min /improving  strength and pinch    4.3 pound  ball  15x2 to improve grasp/   UBE  10 min to increase circulation to help decrease swelling   clothespins x Blue/black to increase pinch strength. Other:     fes  For finger flexion   kinesio taping  To reduce swelling and pain. Flexion glove /hep x Ongoing - To increase active finger flexion. Discussed flexion glove, pt feeling as though he is not getting enough pull in PIPs and DIPs. Asked pt to bring glove in for re-adjustments, possible splint fabrication to block MCPs. PT. To carry a 3# weight throughout the day/ and perform table compressions x To perform several times throughout the day secondary possible crps symptoms   Dry needling  20 min//15 accupoints to calm CNS. Hep/rice grasp, tendon glides, towel scrunches  Encouraged to perform 5 x daily to improve finger mobiltiy and reduce swelling and stiffness. Contrast baths/ rice heating pad  2 x daily for reduction of swelling.  Improved mobility     Assessment/Comments: Pt R/ 14#                                      15#  9 Hole Peg Test:              Left: 20 sec              Right: 31 sec                                                                   28 sec WNL                                              WNL/ goal met     QuickDASH Score: (Scale 100% full disability, 0 no disability):  Decreased from initially 75 percent to 25%                       GOALS (Long term same as Short term):  1) Patient will demonstrate good understanding of home program (exercises/activities/diagnosis/prognosis/goals) with good accuracy. Goal progressing  2) Patient will demonstrate increased active/passive range of motion of their affected extremity/hand to [facility] for ADL/IADL completion./goal progressing  3) Patient will demonstrate increased /pinch strength of at least 10 / 5 pinch pounds of their affected hand. good progress  4) Patient to report decreased pain in their affected hand/upper extremity from 2-5/10 to 0-2/10 or less with resistive functional use. / good progress  5) Increase in fine motor function as evidenced by decreased time to complete 9-hole peg test and/or MRMT test by at least 30 seconds. Goal met  6) Patient will be knowledgeable of edema control techniques as evident with decreases from mod to none. Slow progress  7) Patient will report ADL functions same as prior to diagnosis of swelling R hand, CTR/ goal progressing  8) Patient will demonstrate improved functional activity tolerance from assist/mod ind to ind for ADL/IADL completion. Goal progressing  9) Patient will decrease QuickDASH score by 40% for increased participation in daily functional activities.  Goal progressing          -Rehab Potential: Good    -Requires OT Follow Up: Yes  Time In: 9:00 am            Time Out: 10:00 am           Treatment Charges: Mins Units   Modalities/fluido 15 1   Ther Exercise 20 2   Manual Therapy 20 1   Thera Activities     ADL/Home Mgt      Neuro Re-education     Gait Training     Group Therapy     Non-Billable Service Time     Other     Total Time/Units 55 4   Frequency/Duration 2-3 / week for [id number].   Certification period From: present  To: 1/30/20     I have reviewed the Plan of Care established for skilled therapy services and certify that the services are required and that they will be provided while the patient is under my care.     Physician's Comments/Revisions:                   Physicians's Printed Name:  Dr. Anderson Burciaga   Physician's Signature:                                                               WWYI:        Plan:   [x]  Continues Plan of care: Treatment covered based on POC and graduated to patient's progress. Pt education continues at each visit to obtain maximum benefits from skilled OT intervention. []  Alter Plan of care:   []  Discharge:      Catherine Ortiz OT/L, [place] [id number]    COVID-19 Screening completed upon entering facility and patient cleared for treatment today. Pt followed all protocols set forth by [facility] for Outpatient services. Patient has been made aware of all new hygiene protocols due to COVID-19 set forth by [facility] Outpatient services and agrees to abide by all protocols.

## 2021-01-27 ENCOUNTER — HOSPITAL ENCOUNTER (OUTPATIENT)
Dept: OCCUPATIONAL THERAPY | Age: 66
Setting detail: THERAPIES SERIES
Discharge: HOME | End: 2021-01-27
Payer: MEDICARE

## 2021-01-27 PROCEDURE — 97022 WHIRLPOOL THERAPY: CPT | Performed by: OCCUPATIONAL THERAPIST

## 2021-01-27 PROCEDURE — 97140 MANUAL THERAPY 1/> REGIONS: CPT | Performed by: OCCUPATIONAL THERAPIST

## 2021-01-27 PROCEDURE — 97110 THERAPEUTIC EXERCISES: CPT | Performed by: OCCUPATIONAL THERAPIST

## 2021-01-27 NOTE — PROGRESS NOTES
[address]  [phone]    Date:  2021     Initial Evaluation Date: 8/3/20                                  Evaluating Therapist: Oliver Hodgson     Patient Name:  [facility])                   :  1955     Restrictions/Precautions:  none, low fall risk  Diagnosis:  Swelling R hand /CTR R                                                  Insurance/Certification information:  [employer] Lawrence, Medicare  Referring Practitioner:  Dr. Cheri Bateman  Referring Practitioner specific orders: eval and treat. Edema control, etc, rom. New orders 20 cont hand program. Pt. to see PT. for shoulder. Date of Surgery/Injury: [facility] 7/15/20  Plan of care signed (Y/N):  No  Visit# / total visits: 42/ 18+12=30+18=48   ( approved for 30 visits a year) needs recert by . Pain Level: moderate, aching    Subjective: Pt. States, \" Good day today. Swelling is coming down. .\"     Objective:  Updated POC to be completed by 10th visit. Last reassessment 20  INTERVENTION: COMPLETED: SPECIFICS/COMMENTS:   Modality:     fluido x 15 min - To promote tissue healing, skin desensitization, reduce inflammation, and decrease pain. Actively completed SROM in all available planes with holds at end range during treatment       Paraffin  To wrist digits    AROM:     Wrist all planes x Increase wrist rom   Pendulums  2# wt swing all directions  Added to HEP   Mac grasp  15 min   Table top scrubbing with towel x To improve wrist extension. Shoulder int/ext rotation stretches  15x2, for nerve gliding and tightness rotator cuff. Reduce overall pain shoulder/hand   Place and Holds  12 reps in full fist, 8 reps in hook fist. Increased ROM noted. Fist Release and Place  Followed after place and hold, pt to open digits in extension and quickly place digits back into full flexion in attempts to achieve same motion with place and hold.     Fist Water Bead [employer] bead ball placed in palm of hand, digits flexed and wrapped with co-band to increase flexion. Pt to complete pumps with focus on digging DIPs and PIPs into ball.  reps. In hand Manipulation  Marbles-picking one up at at time-several in hand at a time, placing in a container one at a time. 4 sets, 10 marbles in hand at a time   Finger flex/ext     AAROM:     Shoulder flexion/ex  Wall pulley all directions  Added to HEP        PROM/Stretching:     Wrist /fingers as erin x 15 min Focus on full fist and hook fist. ^'d aggressivenses as tolerated. Putty Fist Rolls x 15 reps fist rolling in putty to loosen digits prior to PROM   Scar Mass/Edema Control:     Soft tissue mobilitzation x 10 min to increase tissue mobiltiy ,. Increase scar mobility        Strengthenin# wrist ext/flex x 20x2 increase wrist strength. digiflex green x 3 min, finger mobility and stengthening   tan flexbar x 15x2    Putty grasp/pull/pnch/ x 15 min /improving  strength and pinch    4.3 pound  ball  15x2 to improve grasp/   UBE  10 min to increase circulation to help decrease swelling   clothespins x Blue/black to increase pinch strength. Other:     fes  For finger flexion   kinesio taping  To reduce swelling and pain. Flexion glove /hep x Ongoing - To increase active finger flexion. Discussed flexion glove, pt feeling as though he is not getting enough pull in PIPs and DIPs. Asked pt to bring glove in for re-adjustments, possible splint fabrication to block MCPs. PT. To carry a 3# weight throughout the day/ and perform table compressions x To perform several times throughout the day secondary possible crps symptoms   Dry needling  20 min//15 accupoints to calm CNS. Hep/rice grasp, tendon glides, towel scrunches  Encouraged to perform 5 x daily to improve finger mobiltiy and reduce swelling and stiffness. Contrast baths/ rice heating pad  2 x daily for reduction of swelling.  Improved mobility Assessment/Comments: Pt is making Good progress toward stated plan of care. Progress cont's with strength and endurance. 9/17/20 11/2/20 11/30/20 12/31/20  Pain level 2- 4-5                                                                        1-3  Active wrist ext/flex: 58/45                                                       58/45                                                       60/45                                        60/50  Active opposition increased to P4 level                                No change to P4 level only                 No change                             Full range  Active  increased to 1/2 range/3/4 ext cont's                 Increased to 2/3-3/4 range                   Cont's 2/3-3/4 range             3/4 flexion   Edema Description/Circumferential Measurements:               Mod swelling                                                                    Mod-mild swelling                                    Mod swelling conts                  Min-mod  Dynamometer (setting 2):                              Left: 82#                                                                           85#                                                          100#                                         100#              Right: 15#                                                                         25#                                                          30#                                           45#  Pinch Meter:              Lateral: Left= 15# ,Right= 12#                                       R/ 13#                                                        R/14#                                        16#              Palmar 3 point: Left= 12#, Right= 12#                            R/ 14# R/ 14#                                      15#  9 Hole Peg Test:              Left: 20 sec              Right: 31 sec                                                                   28 sec WNL                                              WNL/ goal met     QuickDASH Score: (Scale 100% full disability, 0 no disability):  Decreased from initially 75 percent to 25%                       GOALS (Long term same as Short term):  1) Patient will demonstrate good understanding of home program (exercises/activities/diagnosis/prognosis/goals) with good accuracy. Goal progressing  2) Patient will demonstrate increased active/passive range of motion of their affected extremity/hand to [facility] for ADL/IADL completion./goal progressing  3) Patient will demonstrate increased /pinch strength of at least 10 / 5 pinch pounds of their affected hand. good progress  4) Patient to report decreased pain in their affected hand/upper extremity from 2-5/10 to 0-2/10 or less with resistive functional use. / good progress  5) Increase in fine motor function as evidenced by decreased time to complete 9-hole peg test and/or MRMT test by at least 30 seconds. Goal met  6) Patient will be knowledgeable of edema control techniques as evident with decreases from mod to none. Slow progress  7) Patient will report ADL functions same as prior to diagnosis of swelling R hand, CTR/ goal progressing  8) Patient will demonstrate improved functional activity tolerance from assist/mod ind to ind for ADL/IADL completion. Goal progressing  9) Patient will decrease QuickDASH score by 40% for increased participation in daily functional activities.  Goal progressing          -Rehab Potential: Good    -Requires OT Follow Up: Yes  Time In: 9:00 am            Time Out: 10:00 am           Treatment Charges: Mins Units   Modalities/fluido 15 1   Ther Exercise 20 2   Manual Therapy 20 1   Thera Activities     ADL/Home Mgt Neuro Re-education     Gait Training     Group Therapy     Non-Billable Service Time     Other     Total Time/Units 55 4   Frequency/Duration 2-3 / week for [id number].   Certification period From: present  To: 1/30/20     I have reviewed the Plan of Care established for skilled therapy services and certify that the services are required and that they will be provided while the patient is under my care.     Physician's Comments/Revisions:                   Physicians's Printed Name:  Dr. Rosalino Hsu   Physician's Signature:                                                               IMRB:        Plan:   [x]  Continues Plan of care: Treatment covered based on POC and graduated to patient's progress. Pt education continues at each visit to obtain maximum benefits from skilled OT intervention. []  Alter Plan of care:   []  Discharge:      Ian Godwin OT/L, [address] [place] [address]    COVID-19 Screening completed upon entering facility and patient cleared for treatment today. Pt followed all protocols set forth by [address] [place] [address],[address] for Outpatient services. Patient has been made aware of all new hygiene protocols due to COVID-19 set forth by [address] [place] [address],[address] Outpatient services and agrees to abide by all protocols.

## 2021-02-01 ENCOUNTER — HOSPITAL ENCOUNTER (OUTPATIENT)
Dept: OCCUPATIONAL THERAPY | Age: 66
Setting detail: THERAPIES SERIES
Discharge: HOME | End: 2021-02-01
Payer: COMMERCIAL

## 2021-02-01 PROCEDURE — 97140 MANUAL THERAPY 1/> REGIONS: CPT | Performed by: OCCUPATIONAL THERAPIST

## 2021-02-01 PROCEDURE — 97022 WHIRLPOOL THERAPY: CPT | Performed by: OCCUPATIONAL THERAPIST

## 2021-02-01 PROCEDURE — 97110 THERAPEUTIC EXERCISES: CPT | Performed by: OCCUPATIONAL THERAPIST

## 2021-02-01 NOTE — PROGRESS NOTES
[facility]  [phone]    Date:  2021     Initial Evaluation Date: 8/3/20                                  Evaluating Therapist: Ian Godwin     Patient Name:  [facility])                   :  1955     Restrictions/Precautions:  none, low fall risk  Diagnosis:  Swelling R hand /CTR R                                                  Insurance/Certification information:  [employer] Paul, Medicare  Referring Practitioner:  Dr. Michael Covington  Referring Practitioner specific orders: eval and treat. Edema control, etc, rom. New orders 20 cont hand program. Pt. to see PT. for shoulder. Date of Surgery/Injury: [facility] 7/15/20  Plan of care signed (Y/N):  No  Visit# / total visits: 43/ 18+12=30+18=48   ( approved for 30 visits a year) needs recert by . Pain Level: moderate, aching    Subjective: Pt. States, \"I feel my hand is approx 65 percent overall . \"     Objective:  Updated POC to be completed by 10th visit. Last reassessment 21  INTERVENTION: COMPLETED: SPECIFICS/COMMENTS:   Modality:     fluido x 15 min - To promote tissue healing, skin desensitization, reduce inflammation, and decrease pain. Actively completed SROM in all available planes with holds at end range during treatment       Paraffin  To wrist digits    AROM:     Wrist all planes x Increase wrist rom   Pendulums  2# wt swing all directions  Added to HEP   Mac grasp  15 min   Table top scrubbing with towel x To improve wrist extension. Shoulder int/ext rotation stretches  15x2, for nerve gliding and tightness rotator cuff. Reduce overall pain shoulder/hand   Place and Holds  12 reps in full fist, 8 reps in hook fist. Increased ROM noted. Fist Release and Place  Followed after place and hold, pt to open digits in extension and quickly place digits back into full flexion in attempts to achieve same motion with place and hold. Fist Water Bead [employer] bead ball placed in palm of hand, digits flexed and wrapped with co-band to increase flexion. Pt to complete pumps with focus on digging DIPs and PIPs into ball.  reps. In hand Manipulation  Marbles-picking one up at at time-several in hand at a time, placing in a container one at a time. 4 sets, 10 marbles in hand at a time   Finger flex/ext     AAROM:     Shoulder flexion/ex  Wall pulley all directions  Added to HEP        PROM/Stretching:     Wrist /fingers as erin x 15 min Focus on full fist and hook fist. ^'d aggressivenses as tolerated. Putty Fist Rolls x 15 reps fist rolling in putty to loosen digits prior to PROM   Scar Mass/Edema Control:     Soft tissue mobilitzation x 10 min to increase tissue mobiltiy ,. Increase scar mobility        Strengthenin# wrist ext/flex x 20x2 increase wrist strength. digiflex green x 3 min, finger mobility and stengthening   tan flexbar x 15x2    Putty grasp/pull/pnch/ x 15 min /improving  strength and pinch    4.3 pound  ball  15x2 to improve grasp/   UBE  10 min to increase circulation to help decrease swelling   clothespins x Blue/black to increase pinch strength. Other:     fes  For finger flexion   kinesio taping  To reduce swelling and pain. Flexion glove /hep x Ongoing - To increase active finger flexion. Discussed flexion glove, pt feeling as though he is not getting enough pull in PIPs and DIPs. Asked pt to bring glove in for re-adjustments, possible splint fabrication to block MCPs. PT. To carry a 3# weight throughout the day/ and perform table compressions x To perform several times throughout the day secondary possible crps symptoms   Dry needling  20 min//15 accupoints to calm CNS. Hep/rice grasp, tendon glides, towel scrunches  Encouraged to perform 5 x daily to improve finger mobiltiy and reduce swelling and stiffness. Contrast baths/ rice heating pad  2 x daily for reduction of swelling. Improved mobility     Assessment/Comments: Pt is making Good progress toward stated plan of care. Progress cont's with strength and endurance. 9/17/20 11/2/20 11/30/20 12/31/20 2/1/21  Pain level 2- 4-5                                                                        1-3  Active wrist ext/flex: 58/45                                                       58/45                                                60/45                                        60/50                             65/55  Active opposition increased to P4 level                                No change to P4 level only          No change                             Full range                     Goal met  Active  increased to 1/2 range/3/4 ext cont's                 Increased to 2/3-3/4 range           Cont's 2/3-3/4 range             3/4 flexion                      Full flexion after stretching   Edema Description/Circumferential Measurements:               Mod swelling                                                                    Mod-mild swelling                             mod swelling conts                  Min-mod                        Min-mod  Dynamometer (setting 2):                                          Left: 82#                                                                        85#                                         100#                                                100#                                 100#                     Right: 15#                                                                         25#                                                 30#                                           45#                                 60# Pinch Meter:              Lateral: Left= 15# ,Right= 12#                                       R/ 13#                                              R/14#                                        16#                                 14#              Palmar 3 point: Left= 12#, Right= 12#                            R/ 14#                                            R/ 14#                                      15#                                   16#  9 Hole Peg Test:           Left: 20 sec              Right: 31 sec                                                                   28 sec WNL                                       WNL/ goal met     QuickDASH Score: (Scale 100% full disability, 0 no disability):  Decreased from initially 75 percent to 20%                       GOALS (Long term same as Short term):  1) Patient will demonstrate good understanding of home program (exercises/activities/diagnosis/prognosis/goals) with good accuracy. Goal progressing  2) Patient will demonstrate increased active/passive range of motion of their affected extremity/hand to [facility] for ADL/IADL completion./goal progressing  3) Patient will demonstrate increased /pinch strength of at least 10 / 5 pinch pounds of their affected hand. good progress  4) Patient to report decreased pain in their affected hand/upper extremity from 2-5/10 to 0-2/10 or less with resistive functional use. / good progress  5) Increase in fine motor function as evidenced by decreased time to complete 9-hole peg test and/or MRMT test by at least 30 seconds. Goal met  6) Patient will be knowledgeable of edema control techniques as evident with decreases from mod to none. Slow progress  7) Patient will report ADL functions same as prior to diagnosis of swelling R hand, CTR/ goal progressing  8) Patient will demonstrate improved functional activity tolerance from assist/mod ind to ind for ADL/IADL completion.  Goal progressing

## 2021-02-04 ENCOUNTER — HOSPITAL ENCOUNTER (OUTPATIENT)
Dept: OCCUPATIONAL THERAPY | Age: 66
Setting detail: THERAPIES SERIES
Discharge: HOME | End: 2021-02-04
Payer: COMMERCIAL

## 2021-02-04 NOTE — PROGRESS NOTES
[phone]    Occupational Therapy   Cancellation/No-show Note     Patient Name:  Peri Sampson                                                                           total number of no shows:  : 1955  Date:  2021  MRN: [id number]    For today's appointment patient:   [x]  Cancelled   []  Rescheduled appointment   []  No-show     Reason given by patient:   []  Patient ill   []  Conflicting appointment   []  No transportation   []  Conflict with work   [x]  No reason given   []  Other:    Comments:     Electronically signed by: Britney Uriostegui OT/L, [address] [place] [address]

## 2021-02-08 ENCOUNTER — HOSPITAL ENCOUNTER (OUTPATIENT)
Dept: OCCUPATIONAL THERAPY | Age: 66
Setting detail: THERAPIES SERIES
Discharge: HOME | End: 2021-02-08
Payer: COMMERCIAL

## 2021-02-08 PROCEDURE — 97140 MANUAL THERAPY 1/> REGIONS: CPT | Performed by: OCCUPATIONAL THERAPIST

## 2021-02-08 PROCEDURE — 97022 WHIRLPOOL THERAPY: CPT | Performed by: OCCUPATIONAL THERAPIST

## 2021-02-08 PROCEDURE — 97110 THERAPEUTIC EXERCISES: CPT | Performed by: OCCUPATIONAL THERAPIST

## 2021-02-08 NOTE — PROGRESS NOTES
[address]  [phone]    Date:  2021     Initial Evaluation Date: 8/3/20                                  Evaluating Therapist: Stephane Valdez     Patient Name:  [facility])                   :  1955     Restrictions/Precautions:  none, low fall risk  Diagnosis:  Swelling R hand /CTR R                                                  Insurance/Certification information:  [address] Medicare  Referring Practitioner:  Dr. Henery Soulier  Referring Practitioner specific orders: eval and treat. Edema control, etc, rom. New orders 20 cont hand program. Pt. to see PT. for shoulder. Date of Surgery/Injury: CTR 7/15/20  Plan of care signed (Y/N):  No  Visit# / total visits: 44/ 18+12=30+18=48   ( approved for 30 visits a year) needs recert by . Pain Level: moderate, aching    Subjective: Pt. States, \"I started taking lyrica and it may be helping me. Time will tell. Objective:  Updated POC to be completed by 10th visit. Last reassessment 21  INTERVENTION: COMPLETED: SPECIFICS/COMMENTS:   Modality:     fluido x 15 min - To promote tissue healing, skin desensitization, reduce inflammation, and decrease pain. Actively completed SROM in all available planes with holds at end range during treatment       Paraffin  To wrist digits    AROM:     Wrist all planes x Increase wrist rom   Pendulums  2# wt swing all directions  Added to HEP   Mac grasp  15 min   Table top scrubbing with towel x To improve wrist extension. Shoulder int/ext rotation stretches  15x2, for nerve gliding and tightness rotator cuff. Reduce overall pain shoulder/hand   Place and Holds  12 reps in full fist, 8 reps in hook fist. Increased ROM noted. Fist Release and Place  Followed after place and hold, pt to open digits in extension and quickly place digits back into full flexion in attempts to achieve same motion with place and hold. Contrast baths/ rice heating pad  2 x daily for reduction of swelling. Improved mobility     Assessment/Comments: Pt is making Good progress toward stated plan of care. Progress cont's with strength and endurance. 9/17/20 11/2/20 11/30/20 12/31/20 2/1/21  Pain level 2- 4-5                                                                        1-3  Active wrist ext/flex: 58/45                                                       58/45                                                60/45                                        60/50                             65/55  Active opposition increased to P4 level                                No change to P4 level only          No change                             Full range                     Goal met  Active  increased to 1/2 range/3/4 ext cont's                 Increased to 2/3-3/4 range           Cont's 2/3-3/4 range             3/4 flexion                      Full flexion after stretching   Edema Description/Circumferential Measurements:               Mod swelling                                                                    Mod-mild swelling                             mod swelling conts                  Min-mod                        Min-mod  Dynamometer (setting 2):                                          Left: 82#                                                                        85#                                         100#                                                100#                                 100#                     Right: 15#                                                                         25#                                                 30#                                           45#                                 60# Pinch Meter:              Lateral: Left= 15# ,Right= 12#                                       R/ 13#                                              R/14#                                        16#                                 14#              Palmar 3 point: Left= 12#, Right= 12#                            R/ 14#                                            R/ 14#                                      15#                                   16#  9 Hole Peg Test:           Left: 20 sec              Right: 31 sec                                                                   28 sec WNL                                       WNL/ goal met     QuickDASH Score: (Scale 100% full disability, 0 no disability):  Decreased from initially 75 percent to 20%                       GOALS (Long term same as Short term):  1) Patient will demonstrate good understanding of home program (exercises/activities/diagnosis/prognosis/goals) with good accuracy. Goal progressing  2) Patient will demonstrate increased active/passive range of motion of their affected extremity/hand to [facility] for ADL/IADL completion./goal progressing  3) Patient will demonstrate increased /pinch strength of at least 10 / 5 pinch pounds of their affected hand. good progress  4) Patient to report decreased pain in their affected hand/upper extremity from 2-5/10 to 0-2/10 or less with resistive functional use. / good progress  5) Increase in fine motor function as evidenced by decreased time to complete 9-hole peg test and/or MRMT test by at least 30 seconds. Goal met  6) Patient will be knowledgeable of edema control techniques as evident with decreases from mod to none. Slow progress  7) Patient will report ADL functions same as prior to diagnosis of swelling R hand, CTR/ goal progressing  8) Patient will demonstrate improved functional activity tolerance from assist/mod ind to ind for ADL/IADL completion.  Goal progressing 9) Patient will decrease QuickDASH score by 40% for increased participation in daily functional activities. Goal progressing          -Rehab Potential: Good    -Requires OT Follow Up: Yes  Time In: 9:00 am            Time Out: 10:00 am           Treatment Charges: Mins Units   Modalities/fluido 15 1   Ther Exercise 20 2   Manual Therapy 20 1   Thera Activities     ADL/Home Mgt      Neuro Re-education     Gait Training     Group Therapy     Non-Billable Service Time     Other     Total Time/Units 55 4   Frequency/Duration 2-3 / week for [id number].   Certification period From: present  To: 2/30/20     I have reviewed the Plan of Care established for skilled therapy services and certify that the services are required and that they will be provided while the patient is under my care.     Physician's Comments/Revisions:                   Physicians's Printed Name:  Dr. Anderson Burciaga   Physician's Signature:                                                               YCVH:        Plan:   [x]  Continues Plan of care: Treatment covered based on POC and graduated to patient's progress. Pt education continues at each visit to obtain maximum benefits from skilled OT intervention. []  Alter Plan of care:   []  Discharge:  Comments: cont for 5 more treatment sessions and d.c to hep. Catherine Ortiz OT/L, [place] [id number]    [id number] Screening completed upon entering facility and patient cleared for treatment today. Pt followed all protocols set forth by [facility] for Outpatient services. Patient has been made aware of all new hygiene protocols due to COVID-19 set forth by [facility] Outpatient services and agrees to abide by all protocols.

## 2021-02-11 ENCOUNTER — IMMUNIZATION (OUTPATIENT)
Dept: PRIMARY CARE CLINIC | Age: 66
End: 2021-02-11
Payer: COMMERCIAL

## 2021-02-11 ENCOUNTER — HOSPITAL ENCOUNTER (OUTPATIENT)
Dept: OCCUPATIONAL THERAPY | Age: 66
Setting detail: THERAPIES SERIES
Discharge: HOME | End: 2021-02-11
Payer: COMMERCIAL

## 2021-02-11 PROCEDURE — 97110 THERAPEUTIC EXERCISES: CPT | Performed by: OCCUPATIONAL THERAPIST

## 2021-02-11 PROCEDURE — 97022 WHIRLPOOL THERAPY: CPT | Performed by: OCCUPATIONAL THERAPIST

## 2021-02-11 PROCEDURE — 0011A PR IMM ADMN SARSCOV2 100 MCG/0.5 ML 1ST DOSE: CPT | Performed by: NURSE PRACTITIONER

## 2021-02-11 PROCEDURE — 97140 MANUAL THERAPY 1/> REGIONS: CPT | Performed by: OCCUPATIONAL THERAPIST

## 2021-02-11 PROCEDURE — 91301 COVID-19, MODERNA VACCINE 100MCG/0.5ML DOSE: CPT | Performed by: NURSE PRACTITIONER

## 2021-02-11 NOTE — PROGRESS NOTES
[address]  [phone]    Date:  2021     Initial Evaluation Date: 8/3/20                                  Evaluating Therapist: Criss Nelson     Patient Name:  [facility])                   :  1955     Restrictions/Precautions:  none, low fall risk  Diagnosis:  Swelling R hand /CTR R                                                  Insurance/Certification information:  Krist[employer] Olivares, Medicare  Referring Practitioner:  Dr. Rusty Sánchez  Referring Practitioner specific orders: eval and treat. Edema control, etc, rom. New orders 20 cont hand program. Pt. to see PT. for shoulder. Date of Surgery/Injury: CTR 7/15/20  Plan of care signed (Y/N):  No  Visit# / total visits: 45/ 18+12=30+18=48   ( approved for 30 visits a year) needs recert by . Pain Level: moderate, aching    Subjective: Pt. States, \"Im doing pretty good. Happy with my progress. Objective:  Updated POC to be completed by 10th visit. Last reassessment 21  INTERVENTION: COMPLETED: SPECIFICS/COMMENTS:   Modality:     fluido x 15 min - To promote tissue healing, skin desensitization, reduce inflammation, and decrease pain. Actively completed SROM in all available planes with holds at end range during treatment       Paraffin  To wrist digits    AROM:     Wrist all planes x Increase wrist rom   Pendulums  2# wt swing all directions  Added to HEP   Mac grasp  15 min   Table top scrubbing with towel x To improve wrist extension. Shoulder int/ext rotation stretches  15x2, for nerve gliding and tightness rotator cuff. Reduce overall pain shoulder/hand   Place and Holds  12 reps in full fist, 8 reps in hook fist. Increased ROM noted. Fist Release and Place  Followed after place and hold, pt to open digits in extension and quickly place digits back into full flexion in attempts to achieve same motion with place and hold. Fist Water [employer] bead ball placed in palm of hand, digits flexed and wrapped with co-band to increase flexion. Pt to complete pumps with focus on digging DIPs and PIPs into ball.  reps. In hand Manipulation  Marbles-picking one up at at time-several in hand at a time, placing in a container one at a time. 4 sets, 10 marbles in hand at a time   Finger flex/ext     AAROM:     Shoulder flexion/ex  Wall pulley all directions  Added to HEP        PROM/Stretching:     Wrist /fingers as erin x 15 min Focus on full fist and hook fist. ^'d aggressivenses as tolerated. Putty Fist Rolls x 15 reps fist rolling in putty to loosen digits prior to PROM   Scar Mass/Edema Control:     Soft tissue mobilitzation x 10 min to increase tissue mobiltiy ,. Increase scar mobility        Strengthenin# wrist ext/flex x 20x2 increase wrist strength. digiflex green x 3 min, finger mobility and stengthening   tan flexbar x 15x2    Putty grasp/pull/pnch/ x 15 min /improving  strength and pinch    4.3 pound  ball  15x2 to improve grasp/   UBE  10 min to increase circulation to help decrease swelling   clothespins x Blue/black to increase pinch strength. Other:     fes  For finger flexion   kinesio taping  To reduce swelling and pain. Flexion glove /hep x Ongoing - To increase active finger flexion. Discussed flexion glove, pt feeling as though he is not getting enough pull in PIPs and DIPs. Asked pt to bring glove in for re-adjustments, possible splint fabrication to block MCPs. PT. To carry a 3# weight throughout the day/ and perform table compressions x To perform several times throughout the day secondary possible crps symptoms   Dry needling  20 min//15 accupoints to calm CNS. Hep/rice grasp, tendon glides, towel scrunches  Encouraged to perform 5 x daily to improve finger mobiltiy and reduce swelling and stiffness. Contrast baths/ rice heating pad  2 x daily for reduction of swelling. Improved mobility     Assessment/Comments: Pt is making Good progress toward stated plan of care. Progress cont's with strength and endurance. Cont'ed progress noted. To cont for 3 more sessions and d/c to hep. 9/17/20 11/2/20 11/30/20 12/31/20 2/1/21  Pain level 2- 4-5                                                                        1-3  Active wrist ext/flex: 58/45                                                       58/45                                                60/45                                        60/50                             65/55  Active opposition increased to P4 level                                No change to P4 level only          No change                             Full range                     Goal met  Active  increased to 1/2 range/3/4 ext cont's                 Increased to 2/3-3/4 range           Cont's 2/3-3/4 range             3/4 flexion                      Full flexion after stretching   Edema Description/Circumferential Measurements:               Mod swelling                                                                    Mod-mild swelling                             mod swelling conts                  Min-mod                        Min-mod  Dynamometer (setting 2):                                          Left: 82#                                                                        85#                                         100#                                                100#                                 100# Right: 15#                                                                         25#                                                 30#                                           45#                                 60#  Pinch Meter:              Lateral: Left= 15# ,Right= 12#                                       R/ 13#                                              R/14#                                        16#                                 14#              Palmar 3 point: Left= 12#, Right= 12#                            R/ 14#                                            R/ 14#                                      15#                                   16#  9 Hole Peg Test:           Left: 20 sec              Right: 31 sec                                                                   28 sec WNL                                       WNL/ goal met     QuickDASH Score: (Scale 100% full disability, 0 no disability):  Decreased from initially 75 percent to 20%                       GOALS (Long term same as Short term):  1) Patient will demonstrate good understanding of home program (exercises/activities/diagnosis/prognosis/goals) with good accuracy. Goal progressing  2) Patient will demonstrate increased active/passive range of motion of their affected extremity/hand to [facility] for ADL/IADL completion./goal progressing  3) Patient will demonstrate increased /pinch strength of at least 10 / 5 pinch pounds of their affected hand. good progress  4) Patient to report decreased pain in their affected hand/upper extremity from 2-5/10 to 0-2/10 or less with resistive functional use. / good progress  5) Increase in fine motor function as evidenced by decreased time to complete 9-hole peg test and/or MRMT test by at least 30 seconds. Goal met  6) Patient will be knowledgeable of edema control techniques as evident with decreases from mod to none.  Slow progress 7) Patient will report ADL functions same as prior to diagnosis of swelling R hand, CTR/ goal progressing  8) Patient will demonstrate improved functional activity tolerance from assist/mod ind to ind for ADL/IADL completion. Goal progressing  9) Patient will decrease QuickDASH score by 40% for increased participation in daily functional activities. Goal progressing          -Rehab Potential: Good    -Requires OT Follow Up: Yes  Time In: 9:00 am            Time Out: 10:00 am           Treatment Charges: Mins Units   Modalities/fluido 15 1   Ther Exercise 20 2   Manual Therapy 20 1   Thera Activities     ADL/Home Mgt      Neuro Re-education     Gait Training     Group Therapy     Non-Billable Service Time     Other     Total Time/Units 55 4   Frequency/Duration 2-3 / week for [id number].   Certification period From: present  To: 2/30/20     I have reviewed the Plan of Care established for skilled therapy services and certify that the services are required and that they will be provided while the patient is under my care.     Physician's Comments/Revisions:                   Physicians's Printed Name:  Dr. Rosi Fortune   Physician's Signature:                                                               VOLL:        Plan:   [x]  Continues Plan of care: Treatment covered based on POC and graduated to patient's progress. Pt education continues at each visit to obtain maximum benefits from skilled OT intervention. []  Alter Plan of care:   []  Discharge:  Comments: cont for 5 more treatment sessions and d.c to hep.     Criss Nelson OT/JOEL, [address] [place] [address] COVID-19 Screening completed upon entering facility and patient cleared for treatment today. Pt followed all protocols set forth by [address] [place] [address],[facility] for Outpatient services. Patient has been made aware of all new hygiene protocols due to COVID-19 set forth by [address] [place] [address] Outpatient services and agrees to abide by all protocols.

## 2021-02-15 ENCOUNTER — HOSPITAL ENCOUNTER (OUTPATIENT)
Dept: OCCUPATIONAL THERAPY | Age: 66
Setting detail: THERAPIES SERIES
Discharge: HOME | End: 2021-02-15
Payer: COMMERCIAL

## 2021-02-15 PROCEDURE — 97110 THERAPEUTIC EXERCISES: CPT | Performed by: OCCUPATIONAL THERAPIST

## 2021-02-15 PROCEDURE — 97022 WHIRLPOOL THERAPY: CPT | Performed by: OCCUPATIONAL THERAPIST

## 2021-02-15 PROCEDURE — 97140 MANUAL THERAPY 1/> REGIONS: CPT | Performed by: OCCUPATIONAL THERAPIST

## 2021-02-15 NOTE — PROGRESS NOTES
[address]  [phone]    Date:  2/15/2021     Initial Evaluation Date: 8/3/20                                  Evaluating Therapist: Catherine Ortiz     Patient Name:  [facility])                   :  1955     Restrictions/Precautions:  none, low fall risk  Diagnosis:  Swelling R hand /CTR R                                                  Insurance/Certification information:  Mace Kroner, Medicare  Referring Practitioner:  Dr. Julia Cabrera  Referring Practitioner specific orders: eval and treat. Edema control, etc, rom. New orders 20 cont hand program. Pt. to see PT. for shoulder. Date of Surgery/Injury: CTR 7/15/20  Plan of care signed (Y/N):  No  Visit# / total visits: +12=30+18=48   ( approved for 30 visits a year) needs recert by . Pain Level: moderate, aching    Subjective: Pt. States, \"Hand cont's to feel stronger. \"      Objective:  Updated POC to be completed by 10th visit. Last reassessment 21  INTERVENTION: COMPLETED: SPECIFICS/COMMENTS:   Modality:     fluido x 15 min - To promote tissue healing, skin desensitization, reduce inflammation, and decrease pain. Actively completed SROM in all available planes with holds at end range during treatment       Paraffin  To wrist digits    AROM:     Wrist all planes x Increase wrist rom   Pendulums  2# wt swing all directions  Added to HEP   Mac grasp  15 min   Table top scrubbing with towel x To improve wrist extension. Shoulder int/ext rotation stretches  15x2, for nerve gliding and tightness rotator cuff. Reduce overall pain shoulder/hand   Place and Holds  12 reps in full fist, 8 reps in hook fist. Increased ROM noted. Fist Release and Place  Followed after place and hold, pt to open digits in extension and quickly place digits back into full flexion in attempts to achieve same motion with place and hold. Fist Water Bead [employer] bead ball placed in palm of hand, digits flexed and wrapped with co-band to increase flexion. Pt to complete pumps with focus on digging DIPs and PIPs into ball.  reps. In hand Manipulation  Marbles-picking one up at at time-several in hand at a time, placing in a container one at a time. 4 sets, 10 marbles in hand at a time   Finger flex/ext     AAROM:     Shoulder flexion/ex  Wall pulley all directions  Added to HEP        PROM/Stretching:     Wrist /fingers as erin x 15 min Focus on full fist and hook fist. ^'d aggressivenses as tolerated. Putty Fist Rolls x 15 reps fist rolling in putty to loosen digits prior to PROM   Scar Mass/Edema Control:     Soft tissue mobilitzation x 10 min to increase tissue mobiltiy ,. Increase scar mobility        Strengthenin# wrist ext/flex x 20x2 increase wrist strength. digiflex green x 3 min, finger mobility and stengthening   tan flexbar x 15x2    Putty grasp/pull/pnch/ x 15 min /improving  strength and pinch    4.3 pound  ball  15x2 to improve grasp/   UBE  10 min to increase circulation to help decrease swelling   clothespins x Blue/black to increase pinch strength. Other:     fes  For finger flexion   kinesio taping  To reduce swelling and pain. Flexion glove /hep x Ongoing - To increase active finger flexion. Discussed flexion glove, pt feeling as though he is not getting enough pull in PIPs and DIPs. Asked pt to bring glove in for re-adjustments, possible splint fabrication to block MCPs. PT. To carry a 3# weight throughout the day/ and perform table compressions x To perform several times throughout the day secondary possible crps symptoms   Dry needling  20 min//15 accupoints to calm CNS. Hep/rice grasp, tendon glides, towel scrunches  Encouraged to perform 5 x daily to improve finger mobiltiy and reduce swelling and stiffness. Right: 15#                                                                         25#                                                 30#                                           45#                                 60#  Pinch Meter:              Lateral: Left= 15# ,Right= 12#                                       R/ 13#                                              R/14#                                        16#                                 14#              Palmar 3 point: Left= 12#, Right= 12#                            R/ 14#                                            R/ 14#                                      15#                                   16#  9 Hole Peg Test:           Left: 20 sec              Right: 31 sec                                                                   28 sec WNL                                       WNL/ goal met     QuickDASH Score: (Scale 100% full disability, 0 no disability):  Decreased from initially 75 percent to 20%                       GOALS (Long term same as Short term):  1) Patient will demonstrate good understanding of home program (exercises/activities/diagnosis/prognosis/goals) with good accuracy. Goal progressing  2) Patient will demonstrate increased active/passive range of motion of their affected extremity/hand to [facility] for ADL/IADL completion./goal progressing  3) Patient will demonstrate increased /pinch strength of at least 10 / 5 pinch pounds of their affected hand. good progress  4) Patient to report decreased pain in their affected hand/upper extremity from 2-5/10 to 0-2/10 or less with resistive functional use. / good progress  5) Increase in fine motor function as evidenced by decreased time to complete 9-hole peg test and/or MRMT test by at least 30 seconds. Goal met  6) Patient will be knowledgeable of edema control techniques as evident with decreases from mod to none.  Slow progress 7) Patient will report ADL functions same as prior to diagnosis of swelling R hand, CTR/ goal progressing  8) Patient will demonstrate improved functional activity tolerance from assist/mod ind to ind for ADL/IADL completion. Goal progressing  9) Patient will decrease QuickDASH score by 40% for increased participation in daily functional activities. Goal progressing          -Rehab Potential: Good    -Requires OT Follow Up: Yes  Time In: 9:00 am            Time Out: 10:00 am           Treatment Charges: Mins Units   Modalities/fluido 15 1   Ther Exercise 20 2   Manual Therapy 20 1   Thera Activities     ADL/Home Mgt      Neuro Re-education     Gait Training     Group Therapy     Non-Billable Service Time     Other     Total Time/Units 55 4   Frequency/Duration 2-3 / week for [id number].   Certification period From: present  To: 2/30/20     I have reviewed the Plan of Care established for skilled therapy services and certify that the services are required and that they will be provided while the patient is under my care.     Physician's Comments/Revisions:                   Physicians's Printed Name:  Dr. Amanda Robertson   Physician's Signature:                                                               EMI:        Plan:   [x]  Continues Plan of care: Treatment covered based on POC and graduated to patient's progress. Pt education continues at each visit to obtain maximum benefits from skilled OT intervention. []  Alter Plan of care:   []  Discharge:  Comments: cont for 5 more treatment sessions and d.c to hep.     Azael Greer OT/JOEL, [address] [place] [address] COVID-19 Screening completed upon entering facility and patient cleared for treatment today. Pt followed all protocols set forth by [facility] for Outpatient services. Patient has been made aware of all new hygiene protocols due to COVID-19 set forth by [facility] Outpatient services and agrees to abide by all protocols.

## 2021-02-18 ENCOUNTER — HOSPITAL ENCOUNTER (OUTPATIENT)
Dept: OCCUPATIONAL THERAPY | Age: 66
Setting detail: THERAPIES SERIES
Discharge: HOME | End: 2021-02-18
Payer: COMMERCIAL

## 2021-02-18 PROCEDURE — 97110 THERAPEUTIC EXERCISES: CPT | Performed by: OCCUPATIONAL THERAPIST

## 2021-02-18 PROCEDURE — 97140 MANUAL THERAPY 1/> REGIONS: CPT | Performed by: OCCUPATIONAL THERAPIST

## 2021-02-18 PROCEDURE — 97022 WHIRLPOOL THERAPY: CPT | Performed by: OCCUPATIONAL THERAPIST

## 2021-02-18 NOTE — PROGRESS NOTES
[address]  [phone]    Date:  2021     Initial Evaluation Date: 8/3/20                                  Evaluating Therapist: Harshil Justice     Patient Name:  Cat Gaitan [facility])                   :  1955     Restrictions/Precautions:  none, low fall risk  Diagnosis:  Swelling R hand /CTR R                                                  Insurance/Certification information:  [address] [place] [address] Cori Burr, Medicare  Referring Practitioner:  Dr. Debbora Siemens  Referring Practitioner specific orders: eval and treat. Edema control, etc, rom. New orders 20 cont hand program. Pt. to see PT. for shoulder. Date of Surgery/Injury: CTR 7/15/20  Plan of care signed (Y/N):  No  Visit# / total visits: 47/ 18+12=30+18=48   ( approved for 30 visits a year) needs recert by . Pain Level: moderate, aching    Subjective: Pt. States, \"Doing good, happy with progress. \"      Objective:  Updated POC to be completed by 10th visit. Last reassessment 21  INTERVENTION: COMPLETED: SPECIFICS/COMMENTS:   Modality:     fluido x 15 min - To promote tissue healing, skin desensitization, reduce inflammation, and decrease pain. Actively completed SROM in all available planes with holds at end range during treatment       Paraffin  To wrist digits    AROM:     Wrist all planes x Increase wrist rom   Pendulums  2# wt swing all directions  Added to HEP   Mac grasp  15 min   Table top scrubbing with towel x To improve wrist extension. Shoulder int/ext rotation stretches  15x2, for nerve gliding and tightness rotator cuff. Reduce overall pain shoulder/hand   Place and Holds  12 reps in full fist, 8 reps in hook fist. Increased ROM noted. Fist Release and Place  Followed after place and hold, pt to open digits in extension and quickly place digits back into full flexion in attempts to achieve same motion with place and hold. Fist Water [employer] bead ball placed in palm of hand, digits flexed and wrapped with co-band to increase flexion. Pt to complete pumps with focus on digging DIPs and PIPs into ball.  reps. In hand Manipulation  Marbles-picking one up at at time-several in hand at a time, placing in a container one at a time. 4 sets, 10 marbles in hand at a time   Finger flex/ext     AAROM:     Shoulder flexion/ex  Wall pulley all directions  Added to HEP        PROM/Stretching:     Wrist /fingers as erin x 15 min Focus on full fist and hook fist. ^'d aggressivenses as tolerated. Putty Fist Rolls x 15 reps fist rolling in putty to loosen digits prior to PROM   Scar Mass/Edema Control:     Soft tissue mobilitzation x 10 min to increase tissue mobiltiy ,. Increase scar mobility        Strengthenin# wrist ext/flex x 20x2 increase wrist strength. digiflex green x 3 min, finger mobility and stengthening   tan flexbar x 15x2    Putty grasp/pull/pnch/ x 15 min /improving  strength and pinch    4.3 pound  ball  15x2 to improve grasp/   Tricep/bicep  x 15x2  10#   UBE  10 min to increase circulation to help decrease swelling   clothespins x Blue/black to increase pinch strength. Other:     fes  For finger flexion   kinesio taping  To reduce swelling and pain. Flexion glove /hep x Ongoing - To increase active finger flexion. Discussed flexion glove, pt feeling as though he is not getting enough pull in PIPs and DIPs. Asked pt to bring glove in for re-adjustments, possible splint fabrication to block MCPs. PT. To carry a 3# weight throughout the day/ and perform table compressions x To perform several times throughout the day secondary possible crps symptoms   Dry needling  20 min//15 accupoints to calm CNS. Hep/rice grasp, tendon glides, towel scrunches  Encouraged to perform 5 x daily to improve finger mobiltiy and reduce swelling and stiffness. Right: 15#                                                                         25#                                                 30#                                           45#                                 60#  Pinch Meter:              Lateral: Left= 15# ,Right= 12#                                       R/ 13#                                              R/14#                                        16#                                 14#              Palmar 3 point: Left= 12#, Right= 12#                            R/ 14#                                            R/ 14#                                      15#                                   16#  9 Hole Peg Test:           Left: 20 sec              Right: 31 sec                                                                   28 sec WNL                                       WNL/ goal met     QuickDASH Score: (Scale 100% full disability, 0 no disability):  Decreased from initially 75 percent to 20%                       GOALS (Long term same as Short term):  1) Patient will demonstrate good understanding of home program (exercises/activities/diagnosis/prognosis/goals) with good accuracy. Goal progressing  2) Patient will demonstrate increased active/passive range of motion of their affected extremity/hand to [facility] for ADL/IADL completion./goal progressing  3) Patient will demonstrate increased /pinch strength of at least 10 / 5 pinch pounds of their affected hand. good progress  4) Patient to report decreased pain in their affected hand/upper extremity from 2-5/10 to 0-2/10 or less with resistive functional use. / good progress  5) Increase in fine motor function as evidenced by decreased time to complete 9-hole peg test and/or MRMT test by at least 30 seconds. Goal met  6) Patient will be knowledgeable of edema control techniques as evident with decreases from mod to none.  Slow progress 7) Patient will report ADL functions same as prior to diagnosis of swelling R hand, CTR/ goal progressing  8) Patient will demonstrate improved functional activity tolerance from assist/mod ind to ind for ADL/IADL completion. Goal progressing  9) Patient will decrease QuickDASH score by 40% for increased participation in daily functional activities. Goal progressing          -Rehab Potential: Good    -Requires OT Follow Up: Yes  Time In: 9:00 am            Time Out: 10:00 am           Treatment Charges: Mins Units   Modalities/fluido 15 1   Ther Exercise 20 2   Manual Therapy 20 1   Thera Activities     ADL/Home Mgt      Neuro Re-education     Gait Training     Group Therapy     Non-Billable Service Time     Other     Total Time/Units 55 4   Frequency/Duration 2-3 / week for [id number].   Certification period From: present  To: 2/30/20     I have reviewed the Plan of Care established for skilled therapy services and certify that the services are required and that they will be provided while the patient is under my care.     Physician's Comments/Revisions:                   Physicians's Printed Name:  Dr. Jenifer Jack   Physician's Signature:                                                               CVOH:        Plan:   [x]  Continues Plan of care: Treatment covered based on POC and graduated to patient's progress. Pt education continues at each visit to obtain maximum benefits from skilled OT intervention. []  Alter Plan of care:   []  Discharge:  Comments: cont for 5 more treatment sessions and d.c to hep.     Britt Walton OT/L, [address] [place] [address] COVID-19 Screening completed upon entering facility and patient cleared for treatment today. Pt followed all protocols set forth by [facility] for Outpatient services. Patient has been made aware of all new hygiene protocols due to COVID-19 set forth by [facility] Outpatient services and agrees to abide by all protocols.

## 2021-02-22 ENCOUNTER — HOSPITAL ENCOUNTER (OUTPATIENT)
Dept: OCCUPATIONAL THERAPY | Age: 66
Setting detail: THERAPIES SERIES
Discharge: HOME | End: 2021-02-22
Payer: COMMERCIAL

## 2021-02-22 PROCEDURE — 97140 MANUAL THERAPY 1/> REGIONS: CPT | Performed by: OCCUPATIONAL THERAPIST

## 2021-02-22 PROCEDURE — 97110 THERAPEUTIC EXERCISES: CPT | Performed by: OCCUPATIONAL THERAPIST

## 2021-02-22 PROCEDURE — 97022 WHIRLPOOL THERAPY: CPT | Performed by: OCCUPATIONAL THERAPIST

## 2021-02-22 NOTE — PROGRESS NOTES
[address]  [phone]    Date:  2021     Initial Evaluation Date: 8/3/20                                  Evaluating Therapist: Aracely Uriostegui     Patient Name:  [facility])                   :  1955     Restrictions/Precautions:  none, low fall risk  Diagnosis:  Swelling R hand /CTR R                                                  Insurance/Certification information:  [facility], Medicare  Referring Practitioner:  Dr. Gaviria Party  Referring Practitioner specific orders: eval and treat. Edema control, etc, rom. New orders 20 cont hand program. Pt. to see PT. for shoulder. Date of Surgery/Injury: CTR 7/15/20  Plan of care signed (Y/N):  No  Visit# / total visits: 48/ 18+12=30+18=48   ( approved for 30 visits a year) needs recert by . Pain Level: moderate, aching    Subjective: Pt. States, \"Doing good, happy with progress. \"      Objective:  Updated POC to be completed by 10th visit. Last reassessment 21  INTERVENTION: COMPLETED: SPECIFICS/COMMENTS:   Modality:     fluido x 15 min - To promote tissue healing, skin desensitization, reduce inflammation, and decrease pain. Actively completed SROM in all available planes with holds at end range during treatment       Paraffin  To wrist digits    AROM:     Wrist all planes x Increase wrist rom   Pendulums  2# wt swing all directions  Added to HEP   Mac grasp  15 min   Table top scrubbing with towel x To improve wrist extension. Shoulder int/ext rotation stretches  15x2, for nerve gliding and tightness rotator cuff. Reduce overall pain shoulder/hand   Place and Holds  12 reps in full fist, 8 reps in hook fist. Increased ROM noted. Fist Release and Place  Followed after place and hold, pt to open digits in extension and quickly place digits back into full flexion in attempts to achieve same motion with place and hold. Fist Water [employer] bead ball placed in palm of hand, digits flexed and wrapped with co-band to increase flexion. Pt to complete pumps with focus on digging DIPs and PIPs into ball.  reps. In hand Manipulation  Marbles-picking one up at at time-several in hand at a time, placing in a container one at a time. 4 sets, 10 marbles in hand at a time   Finger flex/ext     AAROM:     Shoulder flexion/ex  Wall pulley all directions  Added to HEP        PROM/Stretching:     Wrist /fingers as erin x 15 min Focus on full fist and hook fist. ^'d aggressivenses as tolerated. Putty Fist Rolls x 15 reps fist rolling in putty to loosen digits prior to PROM   Scar Mass/Edema Control:     Soft tissue mobilitzation x 10 min to increase tissue mobiltiy ,. Increase scar mobility        Strengthenin# wrist ext/flex x 20x2 increase wrist strength. digiflex green x 3 min, finger mobility and stengthening   tan flexbar x 15x2    Putty grasp/pull/pnch/ x 15 min /improving  strength and pinch    4.3 pound  ball  15x2 to improve grasp/   Tricep/bicep  x 15x2  10#   UBE  10 min to increase circulation to help decrease swelling   clothespins x Blue/black to increase pinch strength. Other:     fes  For finger flexion   kinesio taping  To reduce swelling and pain. Flexion glove /hep x Ongoing - To increase active finger flexion. Discussed flexion glove, pt feeling as though he is not getting enough pull in PIPs and DIPs. Asked pt to bring glove in for re-adjustments, possible splint fabrication to block MCPs. PT. To carry a 3# weight throughout the day/ and perform table compressions x To perform several times throughout the day secondary possible crps symptoms   Dry needling  20 min//15 accupoints to calm CNS. Hep/rice grasp, tendon glides, towel scrunches  Encouraged to perform 5 x daily to improve finger mobiltiy and reduce swelling and stiffness. Contrast baths/ rice heating pad  2 x daily for reduction of swelling. Improved mobility     Assessment/Comments: Pt is making Good progress toward stated plan of care. Good overall progress. Some weakness and limited motion cont's. Pt. To cont with hep    9/17/20 11/2/20 11/30/20 12/31/20 2/1/21 2/22/21  Pain level 2- 4-5                                                                        1-3  Active wrist ext/flex: 58/45                                                       58/45                                                60/45                                        60/50                             65/55                            65/55  Active opposition increased to P4 level                                No change to P4 level only          No change                             Full range                     Goal met  Active  increased to 1/2 range/3/4 ext cont's                 Increased to 2/3-3/4 range           Cont's 2/3-3/4 range             3/4 flexion                      Full flexion after stretching   Edema Description/Circumferential Measurements:               Mod swelling                                                                    Mod-mild swelling                             mod swelling conts                  Min-mod                        Min-mod                      Min-mod  Dynamometer (setting 2):                                          Left: 82#                                                                        85#                                         100#                                                100#                                 100#                             100# Right: 15#                                                                         25#                                                 30#                                           45#                                 60#                               62#  Pinch Meter:              Lateral: Left= 15# ,Right= 12#                                       R/ 13#                                              R/14#                                        16#                                 14#                               19#              Palmar 3 point: Left= 12#, Right= 12#                            R/ 14#                                            R/ 14#                                      15#                                   16#                               17#  9 Hole Peg Test:           Left: 20 sec              Right: 31 sec                                                                   28 sec WNL                                       WNL/ goal met     QuickDASH Score: (Scale 100% full disability, 0 no disability):  Decreased from initially 75 percent to 15%                       GOALS (Long term same as Short term):  1) Patient will demonstrate good understanding of home program (exercises/activities/diagnosis/prognosis/goals) with good accuracy. Goal progressing  2) Patient will demonstrate increased active/passive range of motion of their affected extremity/hand to [facility] for ADL/IADL completion./good progress  3) Patient will demonstrate increased /pinch strength of at least 10 / 5 pinch pounds of their affected hand. good progress  4) Patient to report decreased pain in their affected hand/upper extremity from 2-5/10 to 0-2/10 or less with resistive functional use. / good progress  5) Increase in fine motor function as evidenced by decreased time to complete 9-hole peg test and/or MRMT test by at least 30 seconds.  Goal met 6) Patient will be knowledgeable of edema control techniques as evident with decreases from mod to none. Slow progress  7) Patient will report ADL functions same as prior to diagnosis of swelling R hand, CTR/ goal progressing  8) Patient will demonstrate improved functional activity tolerance from assist/mod ind to ind for ADL/IADL completion. Goal MET  9) Patient will decrease QuickDASH score by 40% for increased participation in daily functional activities. Goal progressing          -Rehab Potential: Good    -Requires OT Follow Up: Yes  Time In: 9:00 am            Time Out: 10:00 am           Treatment Charges: Mins Units   Modalities/fluido 15 1   Ther Exercise 20 2   Manual Therapy 20 1   Thera Activities     ADL/Home Mgt      Neuro Re-education     Gait Training     Group Therapy     Non-Billable Service Time     Other     Total Time/Units 55 4   Frequency/Duration 2-3 / week for 61TEFYRD.   Certification period From: present  To: 2/30/20     I have reviewed the Plan of Care established for skilled therapy services and certify that the services are required and that they will be provided while the patient is under my care.     Physician's Comments/Revisions:                   Physicians's Printed Name:  Dr. Anderson Burciaga   Physician's Signature:                                                               DTWE:        Plan:   [x]  Continues Plan of care: Treatment covered based on POC and graduated to patient's progress. Pt education continues at each visit to obtain maximum benefits from skilled OT intervention. []  Alter Plan of care:   []  Discharge:  Comments: cont for 5 more treatment sessions and d.c to hep.     Catherine Ortiz OT/L, [address] [place] [address] COVID-19 Screening completed upon entering facility and patient cleared for treatment today. Pt followed all protocols set forth by [facility] for Outpatient services. Patient has been made aware of all new hygiene protocols due to COVID-19 set forth by [facility] services and agrees to abide by all protocols.

## 2021-03-11 ENCOUNTER — IMMUNIZATION (OUTPATIENT)
Dept: PRIMARY CARE CLINIC | Age: 66
End: 2021-03-11
Payer: MEDICARE

## 2021-03-11 PROCEDURE — 91301 COVID-19, MODERNA VACCINE 100MCG/0.5ML DOSE: CPT | Performed by: NURSE PRACTITIONER

## 2021-03-11 PROCEDURE — 0012A PR IMM ADMN SARSCOV2 100 MCG/0.5 ML 2ND DOSE: CPT | Performed by: NURSE PRACTITIONER

## 2024-04-08 ENCOUNTER — APPOINTMENT (OUTPATIENT)
Dept: GENERAL RADIOLOGY | Age: 69
End: 2024-04-08
Payer: MEDICARE

## 2024-04-08 ENCOUNTER — APPOINTMENT (OUTPATIENT)
Dept: CT IMAGING | Age: 69
End: 2024-04-08
Payer: MEDICARE

## 2024-04-08 ENCOUNTER — HOSPITAL ENCOUNTER (EMERGENCY)
Age: 69
Discharge: LEFT BEFORE DISCHARGE | End: 2024-04-08
Attending: EMERGENCY MEDICINE
Payer: MEDICARE

## 2024-04-08 VITALS
SYSTOLIC BLOOD PRESSURE: 111 MMHG | HEART RATE: 88 BPM | RESPIRATION RATE: 18 BRPM | TEMPERATURE: 97 F | OXYGEN SATURATION: 100 % | DIASTOLIC BLOOD PRESSURE: 77 MMHG

## 2024-04-08 DIAGNOSIS — B34.9 VIRAL SYNDROME: Primary | ICD-10-CM

## 2024-04-08 LAB
ANION GAP SERPL CALCULATED.3IONS-SCNC: 10 MMOL/L (ref 7–16)
BACTERIA URNS QL MICRO: ABNORMAL
BASOPHILS # BLD: 0.07 K/UL (ref 0–0.2)
BASOPHILS NFR BLD: 1 % (ref 0–2)
BILIRUB UR QL STRIP: NEGATIVE
BNP SERPL-MCNC: 224 PG/ML (ref 0–450)
BUN SERPL-MCNC: 17 MG/DL (ref 6–23)
CALCIUM SERPL-MCNC: 9.4 MG/DL (ref 8.6–10.2)
CHLORIDE SERPL-SCNC: 108 MMOL/L (ref 98–107)
CLARITY UR: CLEAR
CO2 SERPL-SCNC: 24 MMOL/L (ref 22–29)
COLOR UR: YELLOW
CREAT SERPL-MCNC: 0.8 MG/DL (ref 0.7–1.2)
EOSINOPHIL # BLD: 0.08 K/UL (ref 0.05–0.5)
EOSINOPHILS RELATIVE PERCENT: 1 % (ref 0–6)
EPI CELLS #/AREA URNS HPF: ABNORMAL /HPF
ERYTHROCYTE [DISTWIDTH] IN BLOOD BY AUTOMATED COUNT: 12.4 % (ref 11.5–15)
GFR SERPL CREATININE-BSD FRML MDRD: >90 ML/MIN/1.73M2
GLUCOSE BLD-MCNC: 93 MG/DL (ref 74–99)
GLUCOSE SERPL-MCNC: 77 MG/DL (ref 74–99)
GLUCOSE UR STRIP-MCNC: 100 MG/DL
HCT VFR BLD AUTO: 45.1 % (ref 37–54)
HGB BLD-MCNC: 15.4 G/DL (ref 12.5–16.5)
HGB UR QL STRIP.AUTO: NEGATIVE
IMM GRANULOCYTES # BLD AUTO: 0.04 K/UL (ref 0–0.58)
IMM GRANULOCYTES NFR BLD: 0 % (ref 0–5)
KETONES UR STRIP-MCNC: NEGATIVE MG/DL
LEUKOCYTE ESTERASE UR QL STRIP: NEGATIVE
LYMPHOCYTES NFR BLD: 1.66 K/UL (ref 1.5–4)
LYMPHOCYTES RELATIVE PERCENT: 15 % (ref 20–42)
MAGNESIUM SERPL-MCNC: 2.1 MG/DL (ref 1.6–2.6)
MCH RBC QN AUTO: 29.2 PG (ref 26–35)
MCHC RBC AUTO-ENTMCNC: 34.1 G/DL (ref 32–34.5)
MCV RBC AUTO: 85.6 FL (ref 80–99.9)
MONOCYTES NFR BLD: 0.58 K/UL (ref 0.1–0.95)
MONOCYTES NFR BLD: 5 % (ref 2–12)
NEUTROPHILS NFR BLD: 79 % (ref 43–80)
NEUTS SEG NFR BLD: 9 K/UL (ref 1.8–7.3)
NITRITE UR QL STRIP: POSITIVE
PH UR STRIP: 5.5 [PH] (ref 5–9)
PLATELET # BLD AUTO: 300 K/UL (ref 130–450)
PMV BLD AUTO: 11.2 FL (ref 7–12)
POTASSIUM SERPL-SCNC: 4.1 MMOL/L (ref 3.5–5)
PROT UR STRIP-MCNC: NEGATIVE MG/DL
RBC # BLD AUTO: 5.27 M/UL (ref 3.8–5.8)
RBC #/AREA URNS HPF: ABNORMAL /HPF
SARS-COV-2 RDRP RESP QL NAA+PROBE: NOT DETECTED
SODIUM SERPL-SCNC: 142 MMOL/L (ref 132–146)
SP GR UR STRIP: 1.02 (ref 1–1.03)
SPECIMEN DESCRIPTION: NORMAL
TROPONIN I SERPL HS-MCNC: 13 NG/L (ref 0–11)
TROPONIN I SERPL HS-MCNC: 13 NG/L (ref 0–11)
UROBILINOGEN UR STRIP-ACNC: 0.2 EU/DL (ref 0–1)
WBC #/AREA URNS HPF: ABNORMAL /HPF
WBC OTHER # BLD: 11.4 K/UL (ref 4.5–11.5)

## 2024-04-08 PROCEDURE — 85025 COMPLETE CBC W/AUTO DIFF WBC: CPT

## 2024-04-08 PROCEDURE — 84484 ASSAY OF TROPONIN QUANT: CPT

## 2024-04-08 PROCEDURE — 87635 SARS-COV-2 COVID-19 AMP PRB: CPT

## 2024-04-08 PROCEDURE — 82962 GLUCOSE BLOOD TEST: CPT

## 2024-04-08 PROCEDURE — 83735 ASSAY OF MAGNESIUM: CPT

## 2024-04-08 PROCEDURE — 81001 URINALYSIS AUTO W/SCOPE: CPT

## 2024-04-08 PROCEDURE — 80048 BASIC METABOLIC PNL TOTAL CA: CPT

## 2024-04-08 PROCEDURE — 93005 ELECTROCARDIOGRAM TRACING: CPT | Performed by: EMERGENCY MEDICINE

## 2024-04-08 PROCEDURE — 83880 ASSAY OF NATRIURETIC PEPTIDE: CPT

## 2024-04-08 PROCEDURE — 71046 X-RAY EXAM CHEST 2 VIEWS: CPT

## 2024-04-08 PROCEDURE — 70450 CT HEAD/BRAIN W/O DYE: CPT

## 2024-04-08 PROCEDURE — 99285 EMERGENCY DEPT VISIT HI MDM: CPT

## 2024-04-08 PROCEDURE — 6370000000 HC RX 637 (ALT 250 FOR IP): Performed by: EMERGENCY MEDICINE

## 2024-04-08 RX ORDER — ACETAMINOPHEN 325 MG/1
650 TABLET ORAL ONCE
Status: COMPLETED | OUTPATIENT
Start: 2024-04-08 | End: 2024-04-08

## 2024-04-08 RX ADMIN — ACETAMINOPHEN 650 MG: 325 TABLET ORAL at 17:14

## 2024-04-08 ASSESSMENT — ENCOUNTER SYMPTOMS
SORE THROAT: 0
BACK PAIN: 0
DIARRHEA: 0
SHORTNESS OF BREATH: 0
SINUS PRESSURE: 0
NAUSEA: 0
ABDOMINAL PAIN: 0
VOMITING: 0
RHINORRHEA: 0
COUGH: 0
CHEST TIGHTNESS: 0
WHEEZING: 0

## 2024-04-08 ASSESSMENT — PAIN SCALES - GENERAL: PAINLEVEL_OUTOF10: 6

## 2024-04-08 ASSESSMENT — PAIN DESCRIPTION - LOCATION: LOCATION: HEAD

## 2024-04-08 ASSESSMENT — PAIN DESCRIPTION - DESCRIPTORS: DESCRIPTORS: ACHING

## 2024-04-08 NOTE — ED PROVIDER NOTES
mg/dL    BUN 17 6 - 23 mg/dL    Creatinine 0.8 0.70 - 1.20 mg/dL    Est, Glom Filt Rate >90 >60 mL/min/1.73m2    Calcium 9.4 8.6 - 10.2 mg/dL   Brain Natriuretic Peptide   Result Value Ref Range    Pro- 0 - 450 pg/mL   Magnesium   Result Value Ref Range    Magnesium 2.1 1.6 - 2.6 mg/dL   Troponin   Result Value Ref Range    Troponin, High Sensitivity 13 (H) 0 - 11 ng/L   Urinalysis with Microscopic   Result Value Ref Range    Color, UA Yellow Yellow    Turbidity UA Clear Clear    Glucose, Ur 100 (A) NEGATIVE mg/dL    Bilirubin Urine NEGATIVE NEGATIVE    Ketones, Urine NEGATIVE NEGATIVE mg/dL    Specific Gravity, UA 1.020 1.005 - 1.030    Urine Hgb NEGATIVE NEGATIVE    pH, UA 5.5 5.0 - 9.0    Protein, UA NEGATIVE NEGATIVE mg/dL    Urobilinogen, Urine 0.2 0.0 - 1.0 EU/dL    Nitrite, Urine POSITIVE (A) NEGATIVE    Leukocyte Esterase, Urine NEGATIVE NEGATIVE    WBC, UA 0 TO 5 0 TO 5 /HPF    RBC, UA 0 TO 2 0 TO 2 /HPF    Epithelial Cells UA 10 TO 20 /HPF    Bacteria, UA 1+ (A) None   Troponin   Result Value Ref Range    Troponin, High Sensitivity 13 (H) 0 - 11 ng/L   POCT Glucose   Result Value Ref Range    POC Glucose 93 74 - 99 mg/dL   EKG 12 Lead   Result Value Ref Range    Ventricular Rate 56 BPM    Atrial Rate 56 BPM    P-R Interval 174 ms    QRS Duration 74 ms    Q-T Interval 438 ms    QTc Calculation (Bazett) 422 ms    P Axis 60 degrees    R Axis 15 degrees    T Axis 42 degrees       Radiology:  XR CHEST (2 VW)   Final Result   No acute process.         CT HEAD WO CONTRAST   Final Result   No acute intracranial abnormality.             ------------------------- NURSING NOTES AND VITALS REVIEWED ---------------------------  Date / Time Roomed:  4/8/2024  4:29 PM  ED Bed Assignment:  Internal Waiting A/IntWa*    The nursing notes within the ED encounter and vital signs as below have been reviewed.   /77   Pulse 88   Temp 97 °F (36.1 °C) (Infrared)   Resp 18   SpO2 100%   Oxygen Saturation

## 2024-04-09 LAB
EKG ATRIAL RATE: 56 BPM
EKG P AXIS: 60 DEGREES
EKG P-R INTERVAL: 174 MS
EKG Q-T INTERVAL: 438 MS
EKG QRS DURATION: 74 MS
EKG QTC CALCULATION (BAZETT): 422 MS
EKG R AXIS: 15 DEGREES
EKG T AXIS: 42 DEGREES
EKG VENTRICULAR RATE: 56 BPM

## 2024-04-09 PROCEDURE — 93010 ELECTROCARDIOGRAM REPORT: CPT | Performed by: INTERNAL MEDICINE

## 2024-04-09 NOTE — ED NOTES
Patient got up and started walking out. This RN asked if he was leaving and pt said yes. I informed patient he needed his IV removed and pt stated I can do that at home, I am not waiting. I stopped performing patient care on another patient and told patient I would remove the IV site. IV was in fact removed by this RN. Pt ambulated out of department without any other incident. Dr. Cardinal javier.

## 2024-05-23 NOTE — PROGRESS NOTES
[address]  [phone]    Date:  2020     Initial Evaluation Date: 8/3/20                                  Evaluating Therapist: Abdias Luna     Patient Name:  Vini Duncan                    :  1955     Restrictions/Precautions:  none, low fall risk  Diagnosis:  Swelling R hand /CTR R                                                  Insurance/Certification information:  [employer]  Referring Practitioner:  Dr. Chilango Benavides  Referring Practitioner specific orders: eval and treat. Edema control, etc, rom  Date of Surgery/Injury: CTR 7/15/20  Plan of care signed (Y/N):  No  Visit# / total visits: Pain Level: moderate, aching    Subjective: \"frustrated that hand is locked up and not moving well at all. Really sore too. Domenica Staggers \"     Objective:  Updated POC to be completed by 10th visit. INTERVENTION: COMPLETED: SPECIFICS/COMMENTS:   Modality:     fluido x 10 min to increase tissue mobiltiy        AROM:     Wrist all planes x Increase wrist rom   Bean grasp x 15 min   Tendon glides x 15x2        Finger flex/ext x    AAROM:               PROM/Stretching:     Wrist /fingers as erin x 10 min  Sign. Tenderness fingers/joint tightness. Scar Mass/Edema Control:     Soft tissue mobilitzation x 15 min to increase tissue mobiltiy ,. Increase scar mobility        Strengthening:               Other:     Hep/rice grasp, tendon glides, towel scrunches x Encouraged to perform 5 x daily to improve finger mobiltiy and reduce swelling and stiffness. Contrast baths/ rice heating pad x 2 x daily for reduction of swelling. Improved mobility     Assessment/Comments: Pt is making Good progress toward stated plan of care. Sign stiffness cont's.  Encouraged to perform HEP    -Rehab Potential: Good    -Requires OT Follow Up: Yes  Time In:1            Time Out: 2             Treatment Charges: Mins Units   Modalities/fluido 10 1   Ther Exercise 30 2   Manual Therapy 15 1 Called the Pt. And spoke to him with the below results. Per Dr. Presley:    EEG shows mild intermittent slowing in the right parieto-occipital area, likely due to the underlying reported history of AVM resection/encephalomalacia.  No epileptiform disturbance or electrographic seizures were seen during this entire recording    Thera Activities     ADL/Home Mgt      Neuro Re-education     Gait Training     Group Therapy     Non-Billable Service Time     Other     Total Time/Units 55 4     Goals: Goals for pt can be seen on initial evaluation. Plan:   [x]  Continues Plan of care: Treatment covered based on POC and graduated to patient's progress. Pt education continues at each visit to obtain maximum benefits from skilled OT intervention.   []  Alter Plan of care:   []  Discharge:      Efrain Mike OT/L, [address]

## 2024-10-27 ENCOUNTER — HOSPITAL ENCOUNTER (EMERGENCY)
Age: 69
Discharge: HOME | End: 2024-10-27
Payer: MEDICARE

## 2024-10-27 ENCOUNTER — APPOINTMENT (OUTPATIENT)
Dept: GENERAL RADIOLOGY | Age: 69
End: 2024-10-27
Payer: MEDICARE

## 2024-10-27 VITALS
RESPIRATION RATE: 18 BRPM | OXYGEN SATURATION: 100 % | SYSTOLIC BLOOD PRESSURE: 134 MMHG | HEART RATE: 92 BPM | TEMPERATURE: 97.1 F | DIASTOLIC BLOOD PRESSURE: 78 MMHG

## 2024-10-27 DIAGNOSIS — M16.11 ARTHRITIS OF RIGHT HIP: Primary | ICD-10-CM

## 2024-10-27 DIAGNOSIS — M25.551 ACUTE RIGHT HIP PAIN: ICD-10-CM

## 2024-10-27 PROCEDURE — 6370000000 HC RX 637 (ALT 250 FOR IP): Performed by: NURSE PRACTITIONER

## 2024-10-27 PROCEDURE — 73502 X-RAY EXAM HIP UNI 2-3 VIEWS: CPT

## 2024-10-27 PROCEDURE — 99283 EMERGENCY DEPT VISIT LOW MDM: CPT

## 2024-10-27 RX ORDER — OXYCODONE AND ACETAMINOPHEN 5; 325 MG/1; MG/1
1 TABLET ORAL ONCE
Status: COMPLETED | OUTPATIENT
Start: 2024-10-27 | End: 2024-10-27

## 2024-10-27 RX ORDER — ATORVASTATIN CALCIUM 10 MG/1
10 TABLET, FILM COATED ORAL DAILY
COMMUNITY

## 2024-10-27 RX ORDER — METHYLPREDNISOLONE 4 MG/1
TABLET ORAL
Qty: 21 TABLET | Refills: 0 | Status: SHIPPED | OUTPATIENT
Start: 2024-10-27 | End: 2024-11-02

## 2024-10-27 RX ADMIN — OXYCODONE AND ACETAMINOPHEN 1 TABLET: 5; 325 TABLET ORAL at 11:40

## 2024-10-27 ASSESSMENT — PAIN SCALES - GENERAL: PAINLEVEL_OUTOF10: 8

## 2024-10-27 NOTE — ED NOTES
Department of Emergency Medicine  FIRST PROVIDER TRIAGE NOTE             Independent MLP           10/27/24  10:49 AM EDT    Date of Encounter: 10/27/24   MRN: [id number]      HPI: Dionisio Salazar is a 69 y.o. male who presents to the ED for Hip Pain (Started Friday night and progressively getting worse, painful to touch, tried otc meds and remedies with no affect, trouble ambulating. Limping into triage. )       ROS: Negative for direct trauma to leg.    PE: Gen Appearance/Constitutional: alert  HEENT: NC/NT. PERRLA,  Airway patent.  Neck: supple  CV: regular rate  Pulm: CTA bilat  GI: soft and NT  Musculoskeletal: moves all extremities x 4  Lymphatics: no edema     Initial Plan of Care: All treatment areas with department are currently occupied. Proceed toTreatment Area When Bed Available for ED Attending/MLP to Continue Care    Electronically signed by KAYLA Teague CNP   DD: 10/27/24

## 2024-10-27 NOTE — DISCHARGE INSTR - COC
Continuity of Care Form    Patient Name: Dionisio Salazar   :  1955  MRN:  [id number]    Admit date:  10/27/2024  Discharge date:  ***    Code Status Order: No Order   Advance Directives:   Advance Care Flowsheet Documentation             Admitting Physician:  No admitting provider for patient encounter.  PCP: Cody Sullivan MD    Discharging Nurse: ***  Discharging Hospital Unit/Room#:   Discharging Unit Phone Number: ***    Emergency Contact:   Extended Emergency Contact Information  Primary Emergency Contact: Rowan Bansal   [facility]  Home Phone: [phone]  Mobile Phone: [phone]  Relation: Other  Hearing or visual needs: None  Other needs: None  Preferred language: English   needed? No    Past Surgical History:  Past Surgical History:   Procedure Laterality Date    COLONOSCOPY      ENDOSCOPY, COLON, DIAGNOSTIC      FRACTURE SURGERY Right 2015    spiral fx    TONSILLECTOMY      TYMPANOPLASTY      bilateral       Immunization History:   Immunization History   Administered Date(s) Administered    COVID-19, MODERNA BLUE border, Primary or Immunocompromised, (age 12y+), IM, 100 mcg/0.5mL 2021, 2021    COVID-19, MODERNA Bivalent, (age 12y+), IM, 50 mcg/0.5 mL 10/20/2022    COVID-19, MODERNA, (age 12y+), IM, 50mcg/0.5mL 10/30/2023       Active Problems:  Patient Active Problem List   Diagnosis Code    Chest pain R07.9    Type II or unspecified type diabetes mellitus without mention of complication, uncontrolled [id number]    IBS (irritable bowel syndrome) K58.9    Hiatal hernia K44.9    Osteoporosis M81.0       Isolation/Infection:   Isolation            No Isolation          Patient Infection Status       None to display                     Nurse Assessment:  Last Vital Signs: /78   Pulse 92   Temp 97.1 °F (36.2 °C) (Infrared)   Resp 18   SpO2 100%     Last documented pain score (0-10 scale): Pain Level: 8  Last Weight:   Wt Readings from Last 1

## 2024-10-27 NOTE — ED PROVIDER NOTES
Independent MARTHA Visit.        [facility]  Department of Emergency Medicine   ED  Encounter Note  Admit Date/RoomTime: 10/27/2024 10:54 AM  ED Room:     NAME: Dionisio Salazar  : 1955  MRN: [id number]     Chief Complaint:  Hip Pain (Started Friday night and progressively getting worse, painful to touch, tried otc meds and remedies with no affect, trouble ambulating. Limping into triage. )    History of Present Illness       Dionisio Salazar is a 69 y.o. old male who presents to the emergency department with concern for a 2-day history of right lateral hip pain.  No known direct trauma to the hip.  Started insidiously and has progressively gotten worse over the last 2 days.  Patient states that he is having a lot of pain when going up and down stairs and walking on flat surfaces.  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  Denies any numbness or tingling distally, no back pain, no fevers.  Has tried Biofreeze, heat, massage.  No over-the-counter oral medications have been attempted.  Rates his current pain as an 8 out of 10.  ROS   Pertinent positives and negatives are stated within HPI, all other systems reviewed and are negative.    Past Medical History:  has a past medical history of Chest pain, Depression, and Diabetes mellitus (HCC).    Surgical History:  has a past surgical history that includes Tonsillectomy; Endoscopy, colon, diagnostic; Colonoscopy; Tympanoplasty; and fracture surgery (Right, 2015).    Social History:  reports that he quit smoking about 24 years ago. He has never used smokeless tobacco. He reports current alcohol use. He reports that he does not use drugs.    Family History: family history is not on file.     Allergies: Patient has no known allergies.    Physical Exam   Oxygen Saturation Interpretation: Normal.        ED Triage Vitals [10/27/24 1034]   BP Systolic BP Percentile Diastolic

## 2024-10-27 NOTE — DISCHARGE INSTRUCTIONS
XR HIP 2-3 VW W PELVIS RIGHT   Final Result   Minimal degenerative changes seen within the sacroiliac joints bilaterally.   Some spurring identified of the acetabulum. No acute bony abnormality.

## 2025-05-14 ENCOUNTER — APPOINTMENT (OUTPATIENT)
Dept: CT IMAGING | Age: 70
End: 2025-05-14
Payer: MEDICARE

## 2025-05-14 ENCOUNTER — HOSPITAL ENCOUNTER (EMERGENCY)
Age: 70
Discharge: HOME | End: 2025-05-14
Payer: MEDICARE

## 2025-05-14 VITALS
HEART RATE: 63 BPM | HEIGHT: 72 IN | OXYGEN SATURATION: 97 % | RESPIRATION RATE: 18 BRPM | SYSTOLIC BLOOD PRESSURE: 143 MMHG | DIASTOLIC BLOOD PRESSURE: 75 MMHG | BODY MASS INDEX: 26.41 KG/M2 | TEMPERATURE: 98 F | WEIGHT: 195 LBS

## 2025-05-14 DIAGNOSIS — J01.40 ACUTE NON-RECURRENT PANSINUSITIS: ICD-10-CM

## 2025-05-14 DIAGNOSIS — E87.5 HYPERKALEMIA: ICD-10-CM

## 2025-05-14 DIAGNOSIS — E11.65 HYPERGLYCEMIA DUE TO DIABETES MELLITUS (HCC): ICD-10-CM

## 2025-05-14 DIAGNOSIS — R51.9 SINUS HEADACHE: Primary | ICD-10-CM

## 2025-05-14 LAB
ALBUMIN SERPL-MCNC: 3.8 G/DL (ref 3.5–5.2)
ALP SERPL-CCNC: 132 U/L (ref 40–129)
ALT SERPL-CCNC: 14 U/L (ref 0–40)
ANION GAP SERPL CALCULATED.3IONS-SCNC: 10 MMOL/L (ref 7–16)
ANION GAP SERPL CALCULATED.3IONS-SCNC: 12 MMOL/L (ref 7–16)
AST SERPL-CCNC: 20 U/L (ref 0–39)
BASOPHILS # BLD: 0.04 K/UL (ref 0–0.2)
BASOPHILS NFR BLD: 0 % (ref 0–2)
BILIRUB SERPL-MCNC: 0.4 MG/DL (ref 0–1.2)
BUN SERPL-MCNC: 13 MG/DL (ref 6–23)
BUN SERPL-MCNC: 13 MG/DL (ref 6–23)
CALCIUM SERPL-MCNC: 8.9 MG/DL (ref 8.6–10.2)
CALCIUM SERPL-MCNC: 8.9 MG/DL (ref 8.6–10.2)
CHLORIDE SERPL-SCNC: 103 MMOL/L (ref 98–107)
CHLORIDE SERPL-SCNC: 104 MMOL/L (ref 98–107)
CHP ED QC CHECK: YES
CHP ED QC CHECK: YES
CO2 SERPL-SCNC: 18 MMOL/L (ref 22–29)
CO2 SERPL-SCNC: 20 MMOL/L (ref 22–29)
CREAT SERPL-MCNC: 0.8 MG/DL (ref 0.7–1.2)
CREAT SERPL-MCNC: 0.8 MG/DL (ref 0.7–1.2)
EOSINOPHIL # BLD: 0.12 K/UL (ref 0.05–0.5)
EOSINOPHILS RELATIVE PERCENT: 1 % (ref 0–6)
ERYTHROCYTE [DISTWIDTH] IN BLOOD BY AUTOMATED COUNT: 11.9 % (ref 11.5–15)
FLUAV RNA RESP QL NAA+PROBE: NOT DETECTED
FLUBV RNA RESP QL NAA+PROBE: NOT DETECTED
GFR, ESTIMATED: >90 ML/MIN/1.73M2
GFR, ESTIMATED: >90 ML/MIN/1.73M2
GLUCOSE BLD-MCNC: 165 MG/DL
GLUCOSE BLD-MCNC: 165 MG/DL (ref 74–99)
GLUCOSE BLD-MCNC: 403 MG/DL
GLUCOSE BLD-MCNC: 403 MG/DL (ref 74–99)
GLUCOSE SERPL-MCNC: 370 MG/DL (ref 74–99)
GLUCOSE SERPL-MCNC: 377 MG/DL (ref 74–99)
HCT VFR BLD AUTO: 42 % (ref 37–54)
HGB BLD-MCNC: 14.5 G/DL (ref 12.5–16.5)
IMM GRANULOCYTES # BLD AUTO: 0.06 K/UL (ref 0–0.58)
IMM GRANULOCYTES NFR BLD: 1 % (ref 0–5)
LYMPHOCYTES NFR BLD: 1.2 K/UL (ref 1.5–4)
LYMPHOCYTES RELATIVE PERCENT: 10 % (ref 20–42)
MCH RBC QN AUTO: 29.3 PG (ref 26–35)
MCHC RBC AUTO-ENTMCNC: 34.5 G/DL (ref 32–34.5)
MCV RBC AUTO: 84.8 FL (ref 80–99.9)
MONOCYTES NFR BLD: 0.59 K/UL (ref 0.1–0.95)
MONOCYTES NFR BLD: 5 % (ref 2–12)
NEUTROPHILS NFR BLD: 83 % (ref 43–80)
NEUTS SEG NFR BLD: 9.63 K/UL (ref 1.8–7.3)
PLATELET # BLD AUTO: 344 K/UL (ref 130–450)
PMV BLD AUTO: 10.1 FL (ref 7–12)
POTASSIUM SERPL-SCNC: 4.8 MMOL/L (ref 3.5–5)
POTASSIUM SERPL-SCNC: 5.3 MMOL/L (ref 3.5–5)
PROT SERPL-MCNC: 7.6 G/DL (ref 6.4–8.3)
RBC # BLD AUTO: 4.95 M/UL (ref 3.8–5.8)
SARS-COV-2 RNA RESP QL NAA+PROBE: NOT DETECTED
SODIUM SERPL-SCNC: 133 MMOL/L (ref 132–146)
SODIUM SERPL-SCNC: 134 MMOL/L (ref 132–146)
SOURCE: NORMAL
SPECIMEN DESCRIPTION: NORMAL
WBC OTHER # BLD: 11.6 K/UL (ref 4.5–11.5)

## 2025-05-14 PROCEDURE — 70450 CT HEAD/BRAIN W/O DYE: CPT

## 2025-05-14 PROCEDURE — 87636 SARSCOV2 & INF A&B AMP PRB: CPT

## 2025-05-14 PROCEDURE — 80048 BASIC METABOLIC PNL TOTAL CA: CPT

## 2025-05-14 PROCEDURE — 6360000002 HC RX W HCPCS

## 2025-05-14 PROCEDURE — 96374 THER/PROPH/DIAG INJ IV PUSH: CPT

## 2025-05-14 PROCEDURE — 80053 COMPREHEN METABOLIC PANEL: CPT

## 2025-05-14 PROCEDURE — 82962 GLUCOSE BLOOD TEST: CPT

## 2025-05-14 PROCEDURE — 6370000000 HC RX 637 (ALT 250 FOR IP)

## 2025-05-14 PROCEDURE — 2580000003 HC RX 258

## 2025-05-14 PROCEDURE — 96375 TX/PRO/DX INJ NEW DRUG ADDON: CPT

## 2025-05-14 PROCEDURE — 99284 EMERGENCY DEPT VISIT MOD MDM: CPT

## 2025-05-14 PROCEDURE — 85025 COMPLETE CBC W/AUTO DIFF WBC: CPT

## 2025-05-14 RX ORDER — SEMAGLUTIDE 1.34 MG/ML
INJECTION, SOLUTION SUBCUTANEOUS
COMMUNITY

## 2025-05-14 RX ORDER — DIPHENHYDRAMINE HYDROCHLORIDE 50 MG/ML
25 INJECTION, SOLUTION INTRAMUSCULAR; INTRAVENOUS ONCE
Status: COMPLETED | OUTPATIENT
Start: 2025-05-14 | End: 2025-05-14

## 2025-05-14 RX ORDER — IBUPROFEN 600 MG/1
600 TABLET, FILM COATED ORAL 3 TIMES DAILY PRN
Qty: 30 TABLET | Refills: 0 | Status: SHIPPED | OUTPATIENT
Start: 2025-05-14

## 2025-05-14 RX ORDER — PSEUDOEPHEDRINE HCL 30 MG/1
30 TABLET, FILM COATED ORAL EVERY 6 HOURS PRN
Qty: 12 TABLET | Refills: 0 | Status: SHIPPED | OUTPATIENT
Start: 2025-05-14 | End: 2025-05-17

## 2025-05-14 RX ORDER — DEXAMETHASONE SODIUM PHOSPHATE 10 MG/ML
10 INJECTION, SOLUTION INTRA-ARTICULAR; INTRALESIONAL; INTRAMUSCULAR; INTRAVENOUS; SOFT TISSUE ONCE
Status: COMPLETED | OUTPATIENT
Start: 2025-05-14 | End: 2025-05-14

## 2025-05-14 RX ORDER — 0.9 % SODIUM CHLORIDE 0.9 %
1000 INTRAVENOUS SOLUTION INTRAVENOUS ONCE
Status: COMPLETED | OUTPATIENT
Start: 2025-05-14 | End: 2025-05-14

## 2025-05-14 RX ORDER — METOCLOPRAMIDE HYDROCHLORIDE 5 MG/ML
10 INJECTION INTRAMUSCULAR; INTRAVENOUS ONCE
Status: COMPLETED | OUTPATIENT
Start: 2025-05-14 | End: 2025-05-14

## 2025-05-14 RX ADMIN — SODIUM CHLORIDE 1000 ML: 0.9 INJECTION, SOLUTION INTRAVENOUS at 13:12

## 2025-05-14 RX ADMIN — DIPHENHYDRAMINE HYDROCHLORIDE 25 MG: 50 INJECTION INTRAMUSCULAR; INTRAVENOUS at 11:20

## 2025-05-14 RX ADMIN — INSULIN HUMAN 6 UNITS: 100 INJECTION, SOLUTION PARENTERAL at 13:15

## 2025-05-14 RX ADMIN — METOCLOPRAMIDE HYDROCHLORIDE 10 MG: 5 INJECTION, SOLUTION INTRAMUSCULAR; INTRAVENOUS at 11:20

## 2025-05-14 RX ADMIN — DEXAMETHASONE SODIUM PHOSPHATE 10 MG: 10 INJECTION INTRAMUSCULAR; INTRAVENOUS at 11:20

## 2025-05-14 ASSESSMENT — PAIN - FUNCTIONAL ASSESSMENT: PAIN_FUNCTIONAL_ASSESSMENT: 0-10

## 2025-05-14 ASSESSMENT — PAIN DESCRIPTION - PAIN TYPE: TYPE: ACUTE PAIN

## 2025-05-14 ASSESSMENT — PAIN DESCRIPTION - FREQUENCY: FREQUENCY: CONTINUOUS

## 2025-05-14 ASSESSMENT — PAIN DESCRIPTION - LOCATION: LOCATION: HEAD;FACE

## 2025-05-14 ASSESSMENT — PAIN SCALES - GENERAL: PAINLEVEL_OUTOF10: 9

## 2025-05-14 ASSESSMENT — PAIN DESCRIPTION - DESCRIPTORS: DESCRIPTORS: ACHING;TENDER

## 2025-05-14 NOTE — ED PROVIDER NOTES
Independent MARTHA Visit.          [facility] EMERGENCY DEPARTMENT  EMERGENCY DEPARTMENT ENCOUNTER        Pt Name: Dionisio Salazar  MRN: [id number]  Birthdate 1955  Date of evaluation: 5/14/2025  Provider: KAYLA Mejia - CNP  PCP: Cody Sullivan MD  Note Started: 10:41 AM EDT 5/14/25    CHIEF COMPLAINT       Chief Complaint   Patient presents with    Headache    Facial Pain     States for past week face and top right of head tenderness to touch, \" my head is just killing me\" bending over makes head feel worse, called doctor yesterday and started on amoxicillin yesterday.        HISTORY OF PRESENT ILLNESS: 1 or more Elements   History from : Patient and chart review  Limitations to history : None    Dionisio Salazar is a 70 y.o. male with history of type 2 diabetes mellitus, hyperlipidemia, hypertension, and migraines who presents to the emergency department by private vehicle, for evaluation of gradual onset, intermittent episodes frontal, retro-orbital, and upper face sharp pain which began 4 day(s) prior to arrival.  Initially, he was waking with headaches.  Now headache has been become more persistent.  He did call his PMD yesterday as he was concerned that he may be getting a sinus infection and was prescribed amoxicillin.  He had 3 doses.  He tried ibuprofen 800 mg at 6 AM without relief.  Since onset the symptoms have been gradually worsening and moderate to severe in severity.  The patient has history of headaches of unknown type diagnosed in the past.   Today's episode is not typical for him as it is much worse.  He has had CT Brain in the past. The pain is associated with rhinorrhea and negative for fever, chills, otalgia, pharyngitis, dental pain, nausea, vomiting, light or noise sensitivity, numbness, weakness, speech or visual changes, syncope, seizures, paralysis/weakness, numbness or tingling, involuntary movements, tremor, speech impairment.  The symptoms are aggravated by